# Patient Record
Sex: MALE | Race: BLACK OR AFRICAN AMERICAN | NOT HISPANIC OR LATINO | URBAN - METROPOLITAN AREA
[De-identification: names, ages, dates, MRNs, and addresses within clinical notes are randomized per-mention and may not be internally consistent; named-entity substitution may affect disease eponyms.]

---

## 2023-06-07 ENCOUNTER — HOSPITAL ENCOUNTER (OUTPATIENT)
Dept: DATA CONVERSION | Facility: HOSPITAL | Age: 78
End: 2023-06-07
Attending: INTERNAL MEDICINE | Admitting: INTERNAL MEDICINE
Payer: COMMERCIAL

## 2023-06-07 DIAGNOSIS — C34.11 MALIGNANT NEOPLASM OF UPPER LOBE, RIGHT BRONCHUS OR LUNG (MULTI): ICD-10-CM

## 2023-06-07 DIAGNOSIS — R09.89 OTHER SPECIFIED SYMPTOMS AND SIGNS INVOLVING THE CIRCULATORY AND RESPIRATORY SYSTEMS: ICD-10-CM

## 2023-06-07 DIAGNOSIS — Z79.82 LONG TERM (CURRENT) USE OF ASPIRIN: ICD-10-CM

## 2023-06-07 DIAGNOSIS — I10 ESSENTIAL (PRIMARY) HYPERTENSION: ICD-10-CM

## 2023-06-07 DIAGNOSIS — R91.8 OTHER NONSPECIFIC ABNORMAL FINDING OF LUNG FIELD: ICD-10-CM

## 2023-06-07 DIAGNOSIS — F17.210 NICOTINE DEPENDENCE, CIGARETTES, UNCOMPLICATED: ICD-10-CM

## 2023-06-09 LAB
COMPLETE PATHOLOGY REPORT: NORMAL
CONVERTED ADDENDUM DIAGNOSIS 2: NORMAL
CONVERTED CLINICAL DIAGNOSIS-HISTORY: NORMAL
CONVERTED DIAGNOSIS COMMENT: NORMAL
CONVERTED FINAL DIAGNOSIS: NORMAL
CONVERTED FINAL REPORT PDF LINK TO COPY AND PASTE: NORMAL
CONVERTED RAPID EVALUATION: NORMAL
CONVERTED SPECIMEN DESCRIPTION: NORMAL

## 2023-06-12 LAB
COMPLETE PATHOLOGY REPORT: NORMAL
CONVERTED ADDENDUM DIAGNOSIS 2: NORMAL
CONVERTED CLINICAL DIAGNOSIS-HISTORY: NORMAL
CONVERTED FINAL DIAGNOSIS: NORMAL
CONVERTED FINAL REPORT PDF LINK TO COPY AND PASTE: NORMAL
CONVERTED GROSS DESCRIPTION: NORMAL

## 2023-09-07 VITALS — BODY MASS INDEX: 24.93 KG/M2 | WEIGHT: 174.16 LBS | HEIGHT: 70 IN

## 2023-09-25 PROBLEM — H25.813 COMBINED FORM OF AGE-RELATED CATARACT, BOTH EYES: Status: ACTIVE | Noted: 2023-09-25

## 2023-09-25 PROBLEM — R60.0 EDEMA, LEG: Status: ACTIVE | Noted: 2023-09-25

## 2023-09-25 PROBLEM — H52.00 HYPEROPIA WITH ASTIGMATISM AND PRESBYOPIA: Status: ACTIVE | Noted: 2023-09-25

## 2023-09-25 PROBLEM — R42 DIZZINESS: Status: ACTIVE | Noted: 2023-09-25

## 2023-09-25 PROBLEM — G45.9 TIA (TRANSIENT ISCHEMIC ATTACK): Status: ACTIVE | Noted: 2023-09-25

## 2023-09-25 PROBLEM — R94.31: Status: ACTIVE | Noted: 2023-09-25

## 2023-09-25 PROBLEM — C34.90 NON-SMALL CELL LUNG CANCER (MULTI): Status: ACTIVE | Noted: 2023-09-25

## 2023-09-25 PROBLEM — R04.2 HEMOPTYSIS: Status: ACTIVE | Noted: 2023-09-25

## 2023-09-25 PROBLEM — H52.209 HYPEROPIA WITH ASTIGMATISM AND PRESBYOPIA: Status: ACTIVE | Noted: 2023-09-25

## 2023-09-25 PROBLEM — H90.3 ASYMMETRICAL SENSORINEURAL HEARING LOSS: Status: ACTIVE | Noted: 2023-09-25

## 2023-09-25 PROBLEM — J44.9 CHRONIC OBSTRUCTIVE PULMONARY DISEASE (MULTI): Status: ACTIVE | Noted: 2023-09-25

## 2023-09-25 PROBLEM — H52.4 HYPEROPIA WITH ASTIGMATISM AND PRESBYOPIA: Status: ACTIVE | Noted: 2023-09-25

## 2023-09-25 PROBLEM — H02.889 MEIBOMIAN GLAND DYSFUNCTION (MGD): Status: ACTIVE | Noted: 2023-09-25

## 2023-09-25 PROBLEM — H35.039 HYPERTENSIVE RETINOPATHY, GRADE 1: Status: ACTIVE | Noted: 2023-09-25

## 2023-09-25 PROBLEM — H11.31 SUBCONJUNCTIVAL HEMORRHAGE OF RIGHT EYE: Status: ACTIVE | Noted: 2023-09-25

## 2023-09-25 PROBLEM — R91.8 LUNG MASS: Status: ACTIVE | Noted: 2023-09-25

## 2023-09-25 RX ORDER — ACETAMINOPHEN 500 MG
1 TABLET ORAL 2 TIMES DAILY
COMMUNITY
Start: 2015-01-12

## 2023-09-25 RX ORDER — LISINOPRIL 20 MG/1
TABLET ORAL
COMMUNITY
Start: 2015-11-09

## 2023-09-25 RX ORDER — NYSTATIN 100000 U/G
CREAM TOPICAL
COMMUNITY
Start: 2016-09-26

## 2023-09-25 RX ORDER — CHLORHEXIDINE GLUCONATE ORAL RINSE 1.2 MG/ML
SOLUTION DENTAL
COMMUNITY
Start: 2023-07-13

## 2023-09-25 RX ORDER — LISINOPRIL 40 MG/1
0.5 TABLET ORAL DAILY
COMMUNITY
Start: 2017-06-27

## 2023-09-25 RX ORDER — DUTASTERIDE 0.5 MG/1
CAPSULE, LIQUID FILLED ORAL
COMMUNITY
Start: 2014-11-19

## 2023-09-25 RX ORDER — AMLODIPINE BESYLATE 2.5 MG/1
TABLET ORAL
COMMUNITY
Start: 2014-12-15

## 2023-09-25 RX ORDER — FLUOCINONIDE 0.5 MG/G
OINTMENT TOPICAL
COMMUNITY
Start: 2014-10-01

## 2023-09-25 RX ORDER — ASPIRIN 81 MG/1
1 TABLET ORAL DAILY
COMMUNITY
Start: 2017-05-03

## 2023-09-25 RX ORDER — AMLODIPINE BESYLATE 10 MG/1
1 TABLET ORAL DAILY
COMMUNITY
Start: 2023-05-24

## 2023-09-25 RX ORDER — TAMSULOSIN HYDROCHLORIDE 0.4 MG/1
1 CAPSULE ORAL 2 TIMES DAILY
COMMUNITY

## 2023-09-25 RX ORDER — ATORVASTATIN CALCIUM 10 MG/1
TABLET, FILM COATED ORAL
COMMUNITY
Start: 2014-12-15

## 2023-09-25 RX ORDER — LOSARTAN POTASSIUM 25 MG/1
1 TABLET ORAL DAILY
COMMUNITY
Start: 2023-07-17

## 2023-09-25 RX ORDER — ALBUTEROL SULFATE 90 UG/1
AEROSOL, METERED RESPIRATORY (INHALATION)
COMMUNITY
Start: 2016-09-26

## 2023-09-25 RX ORDER — CHLORHEXIDINE GLUCONATE 4 %
LIQUID (ML) TOPICAL
COMMUNITY
Start: 2023-07-13

## 2023-09-25 RX ORDER — FINASTERIDE 5 MG/1
1 TABLET, FILM COATED ORAL DAILY
COMMUNITY

## 2023-10-02 ENCOUNTER — OFFICE VISIT (OUTPATIENT)
Dept: PULMONOLOGY | Facility: HOSPITAL | Age: 78
End: 2023-10-02
Payer: COMMERCIAL

## 2023-10-02 VITALS
HEART RATE: 55 BPM | WEIGHT: 178.4 LBS | BODY MASS INDEX: 25.54 KG/M2 | HEIGHT: 70 IN | OXYGEN SATURATION: 95 % | DIASTOLIC BLOOD PRESSURE: 80 MMHG | RESPIRATION RATE: 20 BRPM | SYSTOLIC BLOOD PRESSURE: 135 MMHG | TEMPERATURE: 97.4 F

## 2023-10-02 DIAGNOSIS — J44.9 CHRONIC OBSTRUCTIVE PULMONARY DISEASE, UNSPECIFIED COPD TYPE (MULTI): Primary | ICD-10-CM

## 2023-10-02 DIAGNOSIS — C34.91 NON-SMALL CELL CANCER OF RIGHT LUNG (MULTI): ICD-10-CM

## 2023-10-02 DIAGNOSIS — J43.9 PULMONARY EMPHYSEMA, UNSPECIFIED EMPHYSEMA TYPE (MULTI): ICD-10-CM

## 2023-10-02 PROCEDURE — 1036F TOBACCO NON-USER: CPT | Performed by: NURSE PRACTITIONER

## 2023-10-02 PROCEDURE — G0463 HOSPITAL OUTPT CLINIC VISIT: HCPCS | Performed by: NURSE PRACTITIONER

## 2023-10-02 PROCEDURE — 99214 OFFICE O/P EST MOD 30 MIN: CPT | Performed by: NURSE PRACTITIONER

## 2023-10-02 PROCEDURE — 1126F AMNT PAIN NOTED NONE PRSNT: CPT | Performed by: NURSE PRACTITIONER

## 2023-10-02 PROCEDURE — 1159F MED LIST DOCD IN RCRD: CPT | Performed by: NURSE PRACTITIONER

## 2023-10-02 RX ORDER — ALBUTEROL SULFATE 0.83 MG/ML
2.5 SOLUTION RESPIRATORY (INHALATION) 4 TIMES DAILY PRN
Qty: 90 ML | Refills: 3 | Status: SHIPPED | OUTPATIENT
Start: 2023-10-02 | End: 2024-01-03

## 2023-10-02 ASSESSMENT — ENCOUNTER SYMPTOMS
OCCASIONAL FEELINGS OF UNSTEADINESS: 0
LOSS OF SENSATION IN FEET: 0
DEPRESSION: 0

## 2023-10-02 ASSESSMENT — PAIN SCALES - GENERAL: PAINLEVEL: 0-NO PAIN

## 2023-10-02 NOTE — PATIENT INSTRUCTIONS
Mr. Smith is a 78 year old AAM (former smoker~50 pack years ) here for follow up related to lung cancer. Bronchoscopy with biopsy showed adenocarcinoma. Last visit 6/15/23:     1. Lung cancer: adenocarcinoma - 7LN biopsy negative for malignancy  - continue to follow with Hem/Onc and Rad/ Onc     2. COPD: PFTs with severe obstruction. Echo with normal EF.   - start bevespi 2 puffs twice daily   - will order you nebulizer machine   - continue albuterol HFA 2 puffs or albuterol nebulizers every 4-6 hours as needed     Return to clinic 3 months or sooner if needed   Nurse Garcia - [phone]

## 2023-10-02 NOTE — PROGRESS NOTES
Mr. Smith is a 78 year old AAM (former smoker~50 pack years ) here for follow up related to lung cancer. Bronchoscopy with biopsy showed adenocarcinoma. Last visit 6/15/23:     PCP: Dr. Munoz   Hem/Onc: Dr. Jordan   Rad/Onc: Dr. Lee     HPI: Since last visit he underwent SBRT from 8/2023- 9/2023. He states overall he is ok. He states at times his breathing gets a little tired. He was able to get the PRN albuterol and has found it helpful. He has used it a couple times a day. He has wheezing at night and his wife will hear it during the day as well. He has SPARKS with walking on level ground after a few minutes and with going up stairs. He denies any cough, SOB at rest, CP, or GERD. He denies any nasal nasal congestion or runny nose. He does have post nasal drip/ throat clearing. GERD He has been told he snores -- no witnessed apneas.      6/15/23: Mr. Smith presented to the ED with hemoptysis and was found to have a RUL lung mass. He had bronchoscopy with biopsy that showed adenocarcinoma. LN 7 biopsied and negative for malignancy. He denies any SPARKS. He has PRN proair at home - he will use it if he is wheezing. He uses his rescue 4-5 x a week. He has been on the albuterol for the last 2 years.He notices intermittent wheezing. He denies any cough, SOB at rest, allergies, or GERD. HE had some CP from his bronchoscopy - still a little sore, but much improved from previous. He has been having LE swelling. He had COVID in 10/ 2021 - went to PCP at [facility]. He had infection on his foot - happened to find out he was positive from this visit. He states his swelling has been going on intermittently for a number of years.     Previous pulmonary history: He has no history of recurrent infections, or lung disease as a child. He had no previous lung hx, never on oxygen or inhaler therapy.     Inhalers/nebulized medications: PRN albuterol     Hospitalization History: He has not been hospitalized over the last year for  breathing related problem.    Sleep history: Denies snoring, apnea, feeling tired during the day or taking naps during the day. He will doze a times.     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     PFTs: 7/13/23 - FEV1/FVC 0.57/ FEV1 1.06 (43%)/ FVC 1.84 (56%)/ DLCO 29%     CT chest:   - 5/16/23 - IMPRESSION: No evidence of pulmonary embolism. Right upper lobe pulmonary mass measuring 3.7 x 2.7 cm is highly suspicious for malignancy. Adjacent patchy airspace disease is demonstrated in the right upper lobe. Small nodular density is also identified in the left upper lobe measuring 6 mm. Small low-attenuation lesions are identified in the visualized left hepatic lobe. Findings may represent cysts; however, metastatic lesions are not entirely excluded.   - 6/10/23 - Stable appearance of heterogeneous mass in the anterior segment of the right upper lobe, consistent with known lung malignancy. There is adjacent irregular interlobular thickening extending to the subpleural and sub-fissural planes, concerning for a component of lymphangitis. An additional stable punctate nodular opacity is indeterminate, however suspicious for a satellite nodule, in the setting of the additional changes as above. 2. Stable appearance of enlarged and prominent mediastinal lymph nodes, suspicious for glenis involvement. ABDOMEN-PELVIS: 1. No evidence of metastatic involvement in the abdomen and pelvis. 2. Cholelithiasis. 3. Punctate sclerotic focus in the body of L3, favored to represent a bony island, however attention on follow-up is recommended. 4. Additional findings as above       PET: 7/12/23: Hypermetabolic anterior right  upper lobe mass consistent with  biopsy-proven malignancy.  2. Nonspecific mildly hypermetabolic mediastinal and bilateral hilar  lymph nodes which may be reactive in nature, however metastatic  disease can not be definitely excluded.  3. Hypermetabolic focus in the region of the right parotid gland  which is favored to represent a benign process such as a Warthin's  tumor with a metastatic lymph node felt to be less likely.    Lung biopsy - 6/7/23 - LUNG, RIGHT UPPER LOBE NODULE, BIOPSY: --INVASIVE POORLY DIFFERENTIATED ADENOCARCINOMA ADENOCARCINOMA. SEE NOTE. --SEE CONCURRENT CYTOLOGY SPECIMEN [id number].   B. FINE NEEDLE ASPIRATION 7 LYMPH NODE, CYTOLOGY AND CELL BLOCK: NO MALIGNANT CELLS IDENTIFIED. LYMPHOID SAMPLE.    Echo: 7/13/23: EF 60-65%, RA normal size, RV normal size and function.     ROS:   Constitutional: No fever, no chills, no night sweats.  + fatigue   Eyes: No double vision, no floaters, no dry eyes.   ENT: See HPI.   Neck: No neck stiffness.  Cardiovascular: No sharp chest pain, no heart racing, no leg swelling.  Respiratory: as noted in HPI.   Gastrointestinal: No nausea, no vomiting, no diarrhea.   Musculoskeletal: No joint pain, no back pain.   Integumentary: No rashes or sores.  Neurological: No dizziness, no headaches. Sleeping well.  Psychiatric: No mood changes.   Endocrine: No hot flashes, no cold intolerance, weight is stable.  Hematologic: No easy bruising or bleeding.    PE:   Constitutional: General appearance: Alert and oriented.  No acute distress. Well developed, well nourished.  Head and face: Symmetric  ENT: external inspection of ear and nose normal.    Neck: supple, no lymphadenopathy  Pulmonary: Chest is normal. No increased work of breathing or signs of respiratory distress. Clear to auscultation bilaterally - no crackles, wheezing, or rhonchi.   Cardiovascular: Heart rate and rhythm normal. Normal S1, S2 - no murmurs, gallops, or pericardial rub.   Abdomen: Soft, non tender,  +BS  Extremities: No edema. No clubbing or cyanosis of the fingernails.    Neurologic: Moves all four extremities   MSK: Normal movements of extremities. Gait normal   Psychiatric: Intact judgement and insight.     Assessment and Plan:   Mr. Smith is a 78 year old AAM (former smoker~50 pack years ) here for follow up related to lung cancer. Bronchoscopy with biopsy showed adenocarcinoma. Last visit 6/15/23:     1. Lung cancer: adenocarcinoma - 7LN biopsy negative for malignancy  - continue to follow with Hem/Onc and Rad/ Onc     2. COPD: PFTs with severe obstruction. Echo with normal EF.   - start bevespi 2 puffs twice daily   - will order you nebulizer machine   - continue albuterol HFA 2 puffs or albuterol nebulizers every 4-6 hours as needed     Return to clinic 3 months or sooner if needed   Nurse Garcia - [phone]

## 2023-12-13 ENCOUNTER — TELEPHONE (OUTPATIENT)
Dept: RADIATION ONCOLOGY | Facility: HOSPITAL | Age: 78
End: 2023-12-13
Payer: COMMERCIAL

## 2023-12-13 DIAGNOSIS — C34.90 MALIGNANT NEOPLASM OF UNSPECIFIED PART OF UNSPECIFIED BRONCHUS OR LUNG (MULTI): Primary | ICD-10-CM

## 2023-12-14 ENCOUNTER — HOSPITAL ENCOUNTER (OUTPATIENT)
Dept: RADIOLOGY | Facility: HOSPITAL | Age: 78
Discharge: HOME | End: 2023-12-14
Payer: COMMERCIAL

## 2023-12-14 ENCOUNTER — HOSPITAL ENCOUNTER (OUTPATIENT)
Dept: RADIATION ONCOLOGY | Facility: HOSPITAL | Age: 78
Setting detail: RADIATION/ONCOLOGY SERIES
Discharge: HOME | End: 2023-12-14
Payer: COMMERCIAL

## 2023-12-14 VITALS
WEIGHT: 187.2 LBS | OXYGEN SATURATION: 94 % | BODY MASS INDEX: 26.8 KG/M2 | DIASTOLIC BLOOD PRESSURE: 84 MMHG | HEIGHT: 70 IN | TEMPERATURE: 97 F | SYSTOLIC BLOOD PRESSURE: 149 MMHG | HEART RATE: 64 BPM | RESPIRATION RATE: 18 BRPM

## 2023-12-14 DIAGNOSIS — C34.90 MALIGNANT NEOPLASM OF UNSPECIFIED PART OF UNSPECIFIED BRONCHUS OR LUNG (MULTI): ICD-10-CM

## 2023-12-14 DIAGNOSIS — C34.91 NON-SMALL CELL CANCER OF RIGHT LUNG (MULTI): Primary | ICD-10-CM

## 2023-12-14 LAB
CREAT SERPL-MCNC: 0.6 MG/DL (ref 0.6–1.3)
GFR SERPL CREATININE-BSD FRML MDRD: >90 ML/MIN/1.73M*2

## 2023-12-14 PROCEDURE — 2550000001 HC RX 255 CONTRASTS: Performed by: RADIOLOGY

## 2023-12-14 PROCEDURE — 99214 OFFICE O/P EST MOD 30 MIN: CPT | Performed by: NURSE PRACTITIONER

## 2023-12-14 PROCEDURE — 71260 CT THORAX DX C+: CPT

## 2023-12-14 PROCEDURE — 82565 ASSAY OF CREATININE: CPT

## 2023-12-14 PROCEDURE — 71260 CT THORAX DX C+: CPT | Performed by: RADIOLOGY

## 2023-12-14 RX ADMIN — IOHEXOL 75 ML: 350 INJECTION, SOLUTION INTRAVENOUS at 15:25

## 2023-12-14 ASSESSMENT — ENCOUNTER SYMPTOMS
CARDIOVASCULAR NEGATIVE: 1
WHEEZING: 1
MUSCULOSKELETAL NEGATIVE: 1
PSYCHIATRIC NEGATIVE: 1
CHOKING: 0
APNEA: 0
UNEXPECTED WEIGHT CHANGE: 0
OCCASIONAL FEELINGS OF UNSTEADINESS: 0
FEVER: 0
LOSS OF SENSATION IN FEET: 0
COUGH: 0
DEPRESSION: 0
APPETITE CHANGE: 0
CHILLS: 0
DIAPHORESIS: 0
GASTROINTESTINAL NEGATIVE: 1
HEMATOLOGIC/LYMPHATIC NEGATIVE: 1
SHORTNESS OF BREATH: 1
FATIGUE: 0
CHEST TIGHTNESS: 0
NEUROLOGICAL NEGATIVE: 1
ACTIVITY CHANGE: 0

## 2023-12-14 ASSESSMENT — COLUMBIA-SUICIDE SEVERITY RATING SCALE - C-SSRS
2. HAVE YOU ACTUALLY HAD ANY THOUGHTS OF KILLING YOURSELF?: NO
6. HAVE YOU EVER DONE ANYTHING, STARTED TO DO ANYTHING, OR PREPARED TO DO ANYTHING TO END YOUR LIFE?: NO
1. IN THE PAST MONTH, HAVE YOU WISHED YOU WERE DEAD OR WISHED YOU COULD GO TO SLEEP AND NOT WAKE UP?: NO

## 2023-12-14 ASSESSMENT — PATIENT HEALTH QUESTIONNAIRE - PHQ9
2. FEELING DOWN, DEPRESSED OR HOPELESS: NOT AT ALL
SUM OF ALL RESPONSES TO PHQ9 QUESTIONS 1 AND 2: 0
1. LITTLE INTEREST OR PLEASURE IN DOING THINGS: NOT AT ALL

## 2023-12-14 ASSESSMENT — PAIN SCALES - GENERAL: PAINLEVEL: 0-NO PAIN

## 2023-12-14 NOTE — PROGRESS NOTES
Patient ID: [id number]     Mr. Smith is a 77 y/o M with HTN, BPH, who presented to  ED in May 2023 complaining of intermittent coughing & started to cough up sputum mixed with blood for 2 days and was found to have RUL  mass leading to a diagnosis of early stage NSCLC.     Work up details including pathology and imaging results are described below.      Bronchoscopy with EBUS on 06/15/2023 by Dr. Sutton which has confirmed invasive poorly differentiated adenocarcinoma with negative station 7 N. PDL1 M 1%, negative for targeting mutations.     He was seen by Dr. Reynolds in thoracic surgery and underwent additional work up with demonstrated poor PFTs and hence not a surgical candidate.     PFT 07/13/2023: FEV1 < 43% predicted, FVC < 56% prdicted, DLCO <26% predicted.    Stereotactic body radiation therapy (SBRT) to the RUL given from August 30, 2023 through September 11, 2023, 5 fractions, 60 Gy.      History of presenting illness    Nadine Smith is a 78 y.o. male who presents today for follow up 3 months s/p SBRT to the RUL. Patient is doing well. Energy is baseline. Appetite is good. He has gained a few pounds. Denies any changes in breathing. Endorses SOB with exertion and a slight wheeze. Follows with pulmonologist. Using inhalers/nebulizer as prescribed. No oxygen requirements. Denies chest pain, back pain or fevers. No neurological symptoms. Following with Dr. Ester HASSAN.     Review of systems:  Review of Systems   Constitutional:  Negative for activity change, appetite change, chills, diaphoresis, fatigue, fever and unexpected weight change.   HENT: Negative.     Respiratory:  Positive for shortness of breath (with exertion) and wheezing. Negative for apnea, cough, choking and chest tightness.    Cardiovascular: Negative.    Gastrointestinal: Negative.    Genitourinary: Negative.    Musculoskeletal: Negative.    Neurological: Negative.    Hematological: Negative.    Psychiatric/Behavioral:  "Negative.         Past Medical history  He  has a past surgical history that includes CT angio head w and wo IV contrast (5/18/2017) and CT angio neck (5/18/2017).      Last recorded vital:  /84   Pulse 64   Temp 36.1 °C (97 °F) (Skin)   Resp 18   Ht 1.778 m (5' 10\")   Wt 84.9 kg (187 lb 3.2 oz)   SpO2 94%   BMI 26.86 kg/m²     Physical exam  Physical Exam  Constitutional:       General: He is not in acute distress.     Appearance: Normal appearance. He is normal weight. He is not ill-appearing, toxic-appearing or diaphoretic.   HENT:      Head: Normocephalic.      Mouth/Throat:      Mouth: Mucous membranes are moist.      Pharynx: Oropharynx is clear.   Eyes:      Conjunctiva/sclera: Conjunctivae normal.      Pupils: Pupils are equal, round, and reactive to light.   Cardiovascular:      Rate and Rhythm: Normal rate and regular rhythm.      Pulses: Normal pulses.      Heart sounds: Normal heart sounds.   Pulmonary:      Effort: Pulmonary effort is normal. No respiratory distress.      Breath sounds: Wheezing (LLL posterior expiratory wheeze) present. No rhonchi or rales.   Abdominal:      General: Bowel sounds are normal. There is no distension.      Palpations: Abdomen is soft. There is no mass.      Tenderness: There is no abdominal tenderness. There is no guarding or rebound.      Hernia: No hernia is present.   Musculoskeletal:         General: Normal range of motion.      Cervical back: Normal range of motion and neck supple.   Skin:     General: Skin is warm and dry.   Neurological:      General: No focal deficit present.      Mental Status: He is alert and oriented to person, place, and time.      Cranial Nerves: No cranial nerve deficit.      Sensory: No sensory deficit.      Motor: No weakness.      Coordination: Coordination normal.      Gait: Gait normal.   Psychiatric:         Mood and Affect: Mood normal.         Behavior: Behavior normal.         Thought Content: Thought content normal.    "      Judgment: Judgment normal.           Radiology results:  CT chest w IV contrast 12/14/2023    Narrative  Interpreted By:  Damian Styles and Afshari Mirak Sohrab  STUDY:  CT CHEST W IV CONTRAST;  12/14/2023 3:24 pm    INDICATION:  Signs/Symptoms: lung cancer f/u  C34.90: Non-small cell lung cancer.  Per EMR adenocarcinoma with negative lymph nodes status post  radiation from August 2023- September 2023    COMPARISON:  CT chest 06/10/2023.    ACCESSION NUMBER(S):  [id number]    ORDERING CLINICIAN:  RK SUMMERS    TECHNIQUE:  Helical data acquisition of the chest was obtained after  administration of 75 mL Omnipaque 350 intravenous contrast. Images  were reformatted in axial, coronal, and sagittal planes.    FINDINGS:  LUNGS AND AIRWAYS:  The trachea and central airways are patent. No endobronchial lesion  is seen.    Redemonstration of moderate paraseptal and centrilobular  emphysematous changes of bilateral lungs with apical predominance.  Again noted is a lesion spiculated margins right upper lobe measuring  2.6 x 1.5 cm (series 3, image 125), previously measuring 4.1 x 3.3  cm. There are linear bandlike opacities extending the lesion likely  representing post radiation changes. Previously visualized  pleural-based right upper lobe pulmonary nodule is not definitely  seen on the current exam. There are additional small pulmonary  nodules. For example, a 3 mm nodule in the left upper lobe (series 3,  image 173) and a 4 mm nodule in the right minor fissure (series 3,  image 170).    The bilateral lungs otherwise demonstrate no focal consolidation,  pleural effusion, or pneumothorax.    MEDIASTINUM AND LIZABETH, LOWER NECK AND AXILLA:  There is a focus of calcification in the posterior aspect of the left  thyroid lobe. No evidence of thoracic lymphadenopathy by CT criteria.  Esophagus appears within normal limits as seen.    HEART AND VESSELS:  The thoracic aorta normal in course and caliber.There is  mild  scattered calcified atherosclerosis present. Main pulmonary artery  and its branches are normal in caliber. Mild coronary artery  calcifications are seen.Please note,the study is not optimized for  evaluation of coronary arteries. The cardiac chambers are not  enlarged. There is no pericardial effusion seen.    UPPER ABDOMEN:  4.7 cm left renal cyst. There are multiple hypodense lesions  throughout the liver with the largest measuring 1.6 cm, likely  representing benign cysts.. Most of the lesions are too small to  characterize on the current exam. The visualized subdiaphragmatic  structures otherwise demonstrate no remarkable findings.    CHEST WALL AND OSSEOUS STRUCTURES:  Chest wall is within normal limits.  No acute osseous pathology.There are no suspicious osseous  lesions.Mild multilevel degenerative changes within visualized spine.    Impression  1.  Interval decreased size of a right upper lobe pulmonary lesion  with spiculated margins now measuring 2.6 x 1.5 cm. There are  adjacent postradiation changes.  2. No evidence of new lesion throughout the bilateral lungs.  3. No significant mediastinal or hilar lymphadenopathy.  4. Background of moderate emphysematous changes of the bilateral  lungs.  5. Additional findings as above.    I personally reviewed the images/study and I agree with the findings  as stated by resident physician Dr. Jose Alfredo Rousseau . This study  was interpreted at [facility],  [place], [place].    MACRO:  None    Signed by: Damian Styles 12/15/2023 9:24 AM  Dictation workstation:   [id number]         Plan:  Assessment/Plan     78 year old male 3 months s/p SBRT to the RUL. Patient is doing well with no acute complaints related to treatment. Continue to use inhalers and nebulizer as prescribed. Continue follow up with pulmonology as scheduled. CT scan done today prior to this appt. Scan shows interval decreased size of a right upper lobe  pulmonary lesion with spiculated margins now measuring 2.6 x 1.5 cm. There are  adjacent postradiation changes.  No evidence of new lesion throughout the bilateral lungs. No significant mediastinal or hilar lymphadenopathy. Patient to return to clinic in  4 months with same day CT of the chest. Instructed to call with any questions or concerns.       Problem List Items Addressed This Visit    None

## 2023-12-18 ENCOUNTER — OFFICE VISIT (OUTPATIENT)
Dept: PULMONOLOGY | Facility: HOSPITAL | Age: 78
End: 2023-12-18
Payer: COMMERCIAL

## 2023-12-18 VITALS
TEMPERATURE: 97.2 F | HEART RATE: 57 BPM | WEIGHT: 184 LBS | BODY MASS INDEX: 26.4 KG/M2 | DIASTOLIC BLOOD PRESSURE: 81 MMHG | OXYGEN SATURATION: 92 % | SYSTOLIC BLOOD PRESSURE: 147 MMHG

## 2023-12-18 DIAGNOSIS — J44.9 CHRONIC OBSTRUCTIVE PULMONARY DISEASE, UNSPECIFIED COPD TYPE (MULTI): Primary | ICD-10-CM

## 2023-12-18 PROCEDURE — 99213 OFFICE O/P EST LOW 20 MIN: CPT | Performed by: NURSE PRACTITIONER

## 2023-12-18 PROCEDURE — 1159F MED LIST DOCD IN RCRD: CPT | Performed by: NURSE PRACTITIONER

## 2023-12-18 PROCEDURE — 1036F TOBACCO NON-USER: CPT | Performed by: NURSE PRACTITIONER

## 2023-12-18 PROCEDURE — 1126F AMNT PAIN NOTED NONE PRSNT: CPT | Performed by: NURSE PRACTITIONER

## 2023-12-18 RX ORDER — AMLODIPINE BESYLATE 5 MG/1
1 TABLET ORAL DAILY
COMMUNITY

## 2023-12-18 SDOH — ECONOMIC STABILITY: FOOD INSECURITY: WITHIN THE PAST 12 MONTHS, YOU WORRIED THAT YOUR FOOD WOULD RUN OUT BEFORE YOU GOT MONEY TO BUY MORE.: NEVER TRUE

## 2023-12-18 SDOH — ECONOMIC STABILITY: FOOD INSECURITY: WITHIN THE PAST 12 MONTHS, THE FOOD YOU BOUGHT JUST DIDN'T LAST AND YOU DIDN'T HAVE MONEY TO GET MORE.: NEVER TRUE

## 2023-12-18 ASSESSMENT — PATIENT HEALTH QUESTIONNAIRE - PHQ9
1. LITTLE INTEREST OR PLEASURE IN DOING THINGS: NOT AT ALL
2. FEELING DOWN, DEPRESSED OR HOPELESS: NOT AT ALL
SUM OF ALL RESPONSES TO PHQ9 QUESTIONS 1 AND 2: 0

## 2023-12-18 ASSESSMENT — ENCOUNTER SYMPTOMS
AGITATION: 0
HEADACHES: 0
NUMBNESS: 0
ABDOMINAL PAIN: 0
DIARRHEA: 0
JOINT SWELLING: 0
FATIGUE: 0
PALPITATIONS: 0
VOMITING: 0
NERVOUS/ANXIOUS: 0
BACK PAIN: 0
DIZZINESS: 0
ARTHRALGIAS: 0
VOICE CHANGE: 0
RHINORRHEA: 0
FEVER: 0
NAUSEA: 0
MYALGIAS: 0
EYE PAIN: 0
SINUS PRESSURE: 0
WEAKNESS: 0

## 2023-12-18 NOTE — ADDENDUM NOTE
Encounter addended by: Kera Bradley, APRN-STEFANO on: 12/18/2023 10:44 AM   Actions taken: Visit diagnoses modified, Actions taken from a BestPractice Advisory, Order list changed, Diagnosis association updated, Clinical Note Signed

## 2023-12-18 NOTE — PATIENT INSTRUCTIONS
1. Lung cancer: adenocarcinoma - 7LN biopsy negative for malignancy  - continue to follow with Hem/Onc and Rad/ Onc      2. COPD: PFTs with severe obstruction. Echo with normal EF.   - continue bevespi 2 puffs twice daily   - continue albuterol HFA 2 puffs or albuterol nebulizers every 4-6 hours as needed     Thank you for visiting the Pulmonary clinic today!   Return to clinic  6 months or sooner if needed   Gabbie Sutton CNP  My office number is [phone]  Mary is my  and Radha is my nurse.   Radiology scheduling [phone]   Appointment scheduling [phone]

## 2023-12-18 NOTE — PROGRESS NOTES
Patient: Nadine Smith    [id number]  : 1945 -- AGE 78 y.o.    Provider: HARRISON Mari-CNP     Location [facility]   Service Date: 2023              [facility]  Follow up visit note      HISTORY OF PRESENT ILLNESS       HISTORY OF PRESENT ILLNESS     Mr. Smith is a 78 year old AAM (former smoker~50 pack years ) here for follow up related to lung cancer. Bronchoscopy with biopsy showed adenocarcinoma. Last visit 10/2/23     PCP: Dr. Munoz   Hem/Onc: Dr. Jordan   Rad/Onc: Dr. Lee     He has been using his bevespi twice daily, albuterol nebulizer 4x a day, and albuterol HFA a few times a week.   We were able to get him a new nebulizer machine last visit. He denies any cough. He has had some wheezing intermittently. He has SPARKS with going up stairs and walking on level ground after a few minutes. He has noticed some episodes of SOB at rest. He denies any CP or allergies. He will notice some GERD symptoms - depending on what he eats.     10/2/23: Since last visit he underwent SBRT from 2023- 2023. He states overall he is ok. He states at times his breathing gets a little tired. He was able to get the PRN albuterol and has found it helpful. He has used it a couple times a day. He has wheezing at night and his wife will hear it during the day as well. He has SPRAKS with walking on level ground after a few minutes and with going up stairs. He denies any cough, SOB at rest, CP, or GERD. He denies any nasal nasal congestion or runny nose. He does have post nasal drip/ throat clearing. GERD He has been told he snores -- no witnessed apneas.       6/15/23: Mr. Smith presented to the ED with hemoptysis and was found to have a RUL lung mass. He had bronchoscopy with biopsy that showed adenocarcinoma. LN 7 biopsied and negative for malignancy. He denies any SPARKS. He has PRN proair at home - he will use it if he is wheezing.  He uses his rescue 4-5 x a week. He has been on the albuterol for the last 2 years.He notices intermittent wheezing. He denies any cough, SOB at rest, allergies, or GERD. HE had some CP from his bronchoscopy - still a little sore, but much improved from previous. He has been having LE swelling. He had COVID in 10/ 2021 - went to PCP at [facility]. He had infection on his foot - happened to find out he was positive from this visit. He states his swelling has been going on intermittently for a number of years.      Previous pulmonary history: He has no history of recurrent infections, or lung disease as a child. He had no previous lung hx, never on oxygen or inhaler therapy.      Inhalers/nebulized medications: PRN albuterol      Hospitalization History: He has not been hospitalized over the last year for breathing related problem.     Sleep history: Denies snoring, apnea, feeling tired during the day or taking naps during the day. He will doze a times.        ALLERGIES AND MEDICATIONS     ALLERGIES  No Known Allergies    MEDICATIONS  Current Outpatient Medications   Medication Sig Dispense Refill    albuterol 2.5 mg /3 mL (0.083 %) nebulizer solution Take 3 mL (2.5 mg) by nebulization 4 times a day as needed for wheezing or shortness of breath. 90 mL 3    amLODIPine (Norvasc) 10 mg tablet Take 1 tablet (10 mg) by mouth once daily.      amLODIPine (Norvasc) 2.5 mg tablet amLODIPine Besylate 2.5 MG Oral Tablet   Quantity: 30  Refills: 0        Start : 15-Dec-2014   Active      aspirin 81 mg EC tablet Take 1 tablet (81 mg) by mouth once daily.      atorvastatin (Lipitor) 10 mg tablet Atorvastatin Calcium 10 MG Oral Tablet   Quantity: 30  Refills: 0        Start : 15-Dec-2014   Active      calcium carbonate-vitamin D3 600 mg-20 mcg (800 unit) tablet Take 1 tablet by mouth 2 times a day.      chlorhexidine (Peridex) 0.12 % solution RINSE MOUTH WITH 15ML (1 CAPFUL) FOR 30 SECONDS AM AND PM AFTER TOOTHBRUSHING. EXPECTORATE AFTER  RINSING, DO NOT SWALLOW      dutasteride (Avodart) 0.5 mg capsule Avodart 0.5 MG Oral Capsule   Quantity: 30  Refills: 0        Start : 19-Nov-2014   Active      finasteride (Proscar) 5 mg tablet Take 1 tablet (5 mg) by mouth once daily.      fluocinonide (Lidex) 0.05 % ointment Fluocinonide 0.05 % External Ointment   Quantity: 60  Refills: 0        Start : 1-Oct-2014   Active      glycopyrrolate-formoteroL 9-4.8 mcg HFA aerosol inhaler Inhale 2 puffs 2 times a day. 10.7 g 3    Hibiclens 4 % external liquid USE AD DIRECTED FOR PREOPERATIVE SHOWER      lisinopril 20 mg tablet Lisinopril 20 MG Oral Tablet   Quantity: 30  Refills: 0        Start : 9-Nov-2015   Active      lisinopril 40 mg tablet 0.5 tablets (20 mg) once daily.      losartan (Cozaar) 25 mg tablet Take 1 tablet (25 mg) by mouth once daily.      nystatin (Mycostatin) cream Nystatin 308456 UNIT/GM External Cream   Quantity: 120  Refills: 0        Start : 26-Sep-2016   Active      ProAir HFA 90 mcg/actuation inhaler ProAir  (90 Base) MCG/ACT Inhalation Aerosol Solution   Quantity: 85  Refills: 0        Start : 26-Sep-2016   Active      tamsulosin (Flomax) 0.4 mg 24 hr capsule Take 1 capsule (0.4 mg) by mouth 2 times a day. 30 minutes after the same meal each day.       No current facility-administered medications for this visit.         PAST HISTORY     PAST MEDICAL HISTORY  - HTN   - BPH   - lung cancer - adenocarcinoma - s/p SBRT 8/2023      PAST SURGICAL HISTORY  Past Surgical History:   Procedure Laterality Date    CT ANGIO NECK  5/18/2017    CT NECK ANGIO W AND WO IV CONTRAST [place] [facility]    CT HEAD ANGIO W AND WO IV CONTRAST  5/18/2017    CT HEAD ANGIO W AND WO IV CONTRAST [place]18/[place] [facility]       IMMUNIZATION HISTORY  Immunization History   Administered Date(s) Administered    Influenza, seasonal, injectable, preservative free 08/01/2015    Meningococcal MCV4P 08/01/2015    Moderna SARS-CoV-2 Vaccination  04/14/2021, 05/12/2021       SOCIAL HISTORY  smoking: [age] 5-10 cigarettes per day - quit last month ~50 pack years   drinking: none  illicit drug use: none       OCCUPATIONAL/ENVIRONMENTAL HISTORY  Occupation: Retired - 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     RESULTS/DATA     Pulmonary Function Test Results   PFTs: 7/13/23 - FEV1/FVC 0.57/ FEV1 1.06 (43%)/ FVC 1.84 (56%)/ DLCO 29%         Chest Radiograph     XR chest 2 view 06/09/2023  1. Redemonstration of 4 cm ovoid mass in the right upper lobe.  2. Minimal bibasilar atelectasis.      Chest CT Scan   CT chest:   - 5/16/23 - IMPRESSION: No evidence of pulmonary embolism. Right upper lobe pulmonary mass measuring 3.7 x 2.7 cm is highly suspicious for malignancy. Adjacent patchy airspace disease is demonstrated in the right upper lobe. Small nodular density is also identified in the left upper lobe measuring 6 mm. Small low-attenuation lesions are identified in the visualized left hepatic lobe. Findings may represent cysts; however, metastatic lesions are not entirely excluded.   - 6/10/23 - Stable appearance of heterogeneous mass in the anterior segment of the right upper lobe, consistent with known lung malignancy. There is adjacent irregular interlobular thickening extending to the subpleural and sub-fissural planes, concerning for a component of lymphangitis. An additional stable punctate nodular opacity is indeterminate, however suspicious for a satellite nodule, in the setting of the additional changes as above. 2. Stable appearance of enlarged and prominent mediastinal lymph nodes, suspicious for glenis involvement. ABDOMEN-PELVIS: 1. No evidence of metastatic involvement in the abdomen and pelvis. 2. Cholelithiasis. 3. Punctate sclerotic focus in the body of L3, favored to represent a bony island, however attention on follow-up is recommended. 4.  Additional findings as above   - 12/14/23- Interval decreased size of a right upper lobe pulmonary lesion with spiculated margins now measuring 2.6 x 1.5 cm. There are adjacent postradiation changes. 2. No evidence of new lesion throughout the bilateral lungs. 3. No significant mediastinal or hilar lymphadenopathy.4. Background of moderate emphysematous changes of the bilateral lungs. 5. Additional findings as above.    PET: 7/12/23: Hypermetabolic anterior right upper lobe mass consistent with  biopsy-proven malignancy.  2. Nonspecific mildly hypermetabolic mediastinal and bilateral hilar  lymph nodes which may be reactive in nature, however metastatic  disease can not be definitely excluded.  3. Hypermetabolic focus in the region of the right parotid gland  which is favored to represent a benign process such as a Warthin's  tumor with a metastatic lymph node felt to be less likely.     Echocardiogram     Echo: 7/13/23: EF 60-65%, RA normal size, RV normal size and function.         Labs/ Other testing      Lung biopsy - 6/7/23 - LUNG, RIGHT UPPER LOBE NODULE, BIOPSY: --INVASIVE POORLY DIFFERENTIATED ADENOCARCINOMA ADENOCARCINOMA. SEE NOTE. --SEE CONCURRENT CYTOLOGY SPECIMEN [id number].   B. FINE NEEDLE ASPIRATION 7 LYMPH NODE, CYTOLOGY AND CELL BLOCK: NO MALIGNANT CELLS IDENTIFIED. LYMPHOID SAMPLE.    REVIEW OF SYSTEMS     REVIEW OF SYSTEMS  Review of Systems   Constitutional:  Negative for fatigue and fever.   HENT:  Negative for congestion, postnasal drip, rhinorrhea, sinus pressure and voice change.    Eyes:  Negative for pain and visual disturbance.   Cardiovascular:  Negative for chest pain, palpitations and leg swelling.   Gastrointestinal:  Negative for abdominal pain, diarrhea, nausea and vomiting.   Endocrine: Negative for cold intolerance and heat intolerance.   Musculoskeletal:  Negative for arthralgias, back pain, joint swelling and myalgias.   Skin:  Negative for rash.   Neurological:  Negative for  dizziness, weakness, numbness and headaches.   Psychiatric/Behavioral:  Negative for agitation. The patient is not nervous/anxious.          PHYSICAL EXAM     VITAL SIGNS: There were no vitals taken for this visit.     CURRENT WEIGHT: [unfilled]  BMI: [unfilled]  PREVIOUS WEIGHTS:  Wt Readings from Last 3 Encounters:   12/14/23 84.9 kg (187 lb 3.2 oz)   10/02/23 80.9 kg (178 lb 6.4 oz)   06/22/23 77.2 kg (170 lb 2 oz)       Physical Exam  Vitals reviewed.   Constitutional:       General: He is not in acute distress.     Appearance: Normal appearance. He is not ill-appearing or toxic-appearing.   HENT:      Head: Normocephalic.      Nose: No rhinorrhea.   Cardiovascular:      Rate and Rhythm: Normal rate and regular rhythm.      Heart sounds: Normal heart sounds.   Pulmonary:      Effort: Pulmonary effort is normal. No respiratory distress.      Breath sounds: Normal breath sounds. No stridor.   Abdominal:      General: Abdomen is flat.   Musculoskeletal:         General: No swelling. Normal range of motion.   Skin:     General: Skin is warm and dry.      Nails: There is no clubbing.   Neurological:      General: No focal deficit present.      Mental Status: He is alert.   Psychiatric:         Mood and Affect: Mood normal.         Behavior: Behavior normal.         Judgment: Judgment normal.       ASSESSMENT/PLAN       1. Lung cancer: adenocarcinoma - 7LN biopsy negative for malignancy  - continue to follow with Hem/Onc and Rad/ Onc      2. COPD: PFTs with severe obstruction. Echo with normal EF.   - continue bevespi 2 puffs twice daily   - continue albuterol HFA 2 puffs or albuterol nebulizers every 4-6 hours as needed     --> Discussed talking with his PCP (possible SW) or financial assistance through [employer] - had concerns and questions regarding his medical bills.

## 2024-01-03 DIAGNOSIS — J43.9 PULMONARY EMPHYSEMA, UNSPECIFIED EMPHYSEMA TYPE (MULTI): ICD-10-CM

## 2024-01-03 RX ORDER — ALBUTEROL SULFATE 0.83 MG/ML
2.5 SOLUTION RESPIRATORY (INHALATION) 4 TIMES DAILY PRN
Qty: 75 ML | Refills: 3 | Status: SHIPPED | OUTPATIENT
Start: 2024-01-03 | End: 2024-04-24

## 2024-01-03 RX ORDER — GLYCOPYRROLATE AND FORMOTEROL FUMARATE 9; 4.8 UG/1; UG/1
AEROSOL, METERED RESPIRATORY (INHALATION)
Qty: 10.7 G | Refills: 3 | Status: SHIPPED | OUTPATIENT
Start: 2024-01-03

## 2024-01-25 ENCOUNTER — HOSPITAL ENCOUNTER (OUTPATIENT)
Dept: RADIOLOGY | Facility: HOSPITAL | Age: 79
Discharge: HOME | End: 2024-01-25
Payer: MEDICARE

## 2024-01-25 DIAGNOSIS — R07.9 CHEST PAIN, UNSPECIFIED: ICD-10-CM

## 2024-01-25 DIAGNOSIS — C34.11 MALIGNANT NEOPLASM OF UPPER LOBE, RIGHT BRONCHUS OR LUNG (MULTI): ICD-10-CM

## 2024-01-25 PROCEDURE — 71046 X-RAY EXAM CHEST 2 VIEWS: CPT

## 2024-01-25 PROCEDURE — 71046 X-RAY EXAM CHEST 2 VIEWS: CPT | Performed by: RADIOLOGY

## 2024-01-30 ENCOUNTER — SOCIAL WORK (OUTPATIENT)
Dept: CASE MANAGEMENT | Facility: HOSPITAL | Age: 79
End: 2024-01-30
Payer: MEDICARE

## 2024-01-30 DIAGNOSIS — J44.9 CHRONIC OBSTRUCTIVE PULMONARY DISEASE, UNSPECIFIED COPD TYPE (MULTI): Primary | ICD-10-CM

## 2024-01-30 NOTE — PROGRESS NOTES
This SW was asked by [facility] HARRISON to reach out to pt, who had called to ask for a  to help with medical bills. ASHLEY has tried several times to reach pt by phone without success, and he does not have voicemail set up. ASHLEY wrote letter with ASHLEY ph # and suggesting he call me and/or we can set up a time to meet. ASHLEY did not see any appts scheduled until April.

## 2024-01-31 RX ORDER — UMECLIDINIUM BROMIDE AND VILANTEROL TRIFENATATE 62.5; 25 UG/1; UG/1
1 POWDER RESPIRATORY (INHALATION) DAILY
Qty: 1 EACH | Refills: 5 | Status: SHIPPED | OUTPATIENT
Start: 2024-01-31

## 2024-02-12 ENCOUNTER — SOCIAL WORK (OUTPATIENT)
Dept: CASE MANAGEMENT | Facility: HOSPITAL | Age: 79
End: 2024-02-12
Payer: MEDICARE

## 2024-02-12 NOTE — PROGRESS NOTES
This SW has tried calling pt multiple times, with no answer and no ability to leave a message. Pt did reply and call this SW one time, apparently after receiving this SW's letter via US mail dated 01/30/24. After consultation with his [facility] financial counselor, this SW sent a letter dated 02/12, asking that pt contact fc instead, providing her name and number . She can assist him with completing the financial assistance application correctly and completely.

## 2024-04-17 ENCOUNTER — TELEPHONE (OUTPATIENT)
Dept: RADIATION ONCOLOGY | Facility: HOSPITAL | Age: 79
End: 2024-04-17
Payer: MEDICARE

## 2024-04-17 DIAGNOSIS — C34.91 MALIGNANT NEOPLASM OF UNSPECIFIED PART OF RIGHT BRONCHUS OR LUNG (MULTI): Primary | ICD-10-CM

## 2024-04-17 NOTE — TELEPHONE ENCOUNTER
Called pt to remind of appointment on 4/18/2024 at 1:30. Pt's phone went to voicemail but the mailbox hasn't been set up yet.

## 2024-04-18 ENCOUNTER — HOSPITAL ENCOUNTER (OUTPATIENT)
Dept: RADIOLOGY | Facility: HOSPITAL | Age: 79
Discharge: HOME | End: 2024-04-18
Payer: MEDICARE

## 2024-04-18 ENCOUNTER — HOSPITAL ENCOUNTER (OUTPATIENT)
Dept: RADIATION ONCOLOGY | Facility: HOSPITAL | Age: 79
Setting detail: RADIATION/ONCOLOGY SERIES
Discharge: HOME | End: 2024-04-18
Payer: MEDICARE

## 2024-04-18 VITALS
SYSTOLIC BLOOD PRESSURE: 172 MMHG | BODY MASS INDEX: 26.89 KG/M2 | WEIGHT: 187.39 LBS | HEART RATE: 73 BPM | TEMPERATURE: 96.4 F | OXYGEN SATURATION: 95 % | DIASTOLIC BLOOD PRESSURE: 88 MMHG | RESPIRATION RATE: 18 BRPM

## 2024-04-18 DIAGNOSIS — C34.91 NON-SMALL CELL CANCER OF RIGHT LUNG (MULTI): Primary | ICD-10-CM

## 2024-04-18 DIAGNOSIS — C34.91 NON-SMALL CELL CANCER OF RIGHT LUNG (MULTI): ICD-10-CM

## 2024-04-18 LAB
CREAT SERPL-MCNC: 0.8 MG/DL (ref 0.6–1.3)
GFR SERPL CREATININE-BSD FRML MDRD: 89 ML/MIN/1.73M*2

## 2024-04-18 PROCEDURE — 71260 CT THORAX DX C+: CPT

## 2024-04-18 PROCEDURE — 71260 CT THORAX DX C+: CPT | Performed by: RADIOLOGY

## 2024-04-18 PROCEDURE — 82565 ASSAY OF CREATININE: CPT

## 2024-04-18 PROCEDURE — 99214 OFFICE O/P EST MOD 30 MIN: CPT | Performed by: NURSE PRACTITIONER

## 2024-04-18 PROCEDURE — 2550000001 HC RX 255 CONTRASTS: Performed by: NURSE PRACTITIONER

## 2024-04-18 RX ADMIN — IOHEXOL 75 ML: 350 INJECTION, SOLUTION INTRAVENOUS at 14:09

## 2024-04-18 ASSESSMENT — ENCOUNTER SYMPTOMS
CARDIOVASCULAR NEGATIVE: 1
WHEEZING: 1
ACTIVITY CHANGE: 0
OCCASIONAL FEELINGS OF UNSTEADINESS: 0
DEPRESSION: 0
UNEXPECTED WEIGHT CHANGE: 0
SHORTNESS OF BREATH: 1
HEMATOLOGIC/LYMPHATIC NEGATIVE: 1
LOSS OF SENSATION IN FEET: 0
CHOKING: 0
GASTROINTESTINAL NEGATIVE: 1
APNEA: 0
DIAPHORESIS: 0
CHILLS: 0
NEUROLOGICAL NEGATIVE: 1
APPETITE CHANGE: 0
MUSCULOSKELETAL NEGATIVE: 1
FEVER: 0
COUGH: 0
PSYCHIATRIC NEGATIVE: 1
FATIGUE: 0
CHEST TIGHTNESS: 0

## 2024-04-18 ASSESSMENT — PATIENT HEALTH QUESTIONNAIRE - PHQ9
1. LITTLE INTEREST OR PLEASURE IN DOING THINGS: NOT AT ALL
SUM OF ALL RESPONSES TO PHQ9 QUESTIONS 1 AND 2: 0
2. FEELING DOWN, DEPRESSED OR HOPELESS: NOT AT ALL

## 2024-04-18 ASSESSMENT — PAIN SCALES - GENERAL: PAINLEVEL: 0-NO PAIN

## 2024-04-18 NOTE — PROGRESS NOTES
Patient ID: [id number]     Mr. Smith is a 80 y/o M with HTN, BPH, who presented to  ED in May 2023 complaining of intermittent coughing & started to cough up sputum mixed with blood for 2 days and was found to have RUL  mass leading to a diagnosis of early stage NSCLC.     Work up details including pathology and imaging results are described below.      Bronchoscopy with EBUS on 06/15/2023 by Dr. Sutton which has confirmed invasive poorly differentiated adenocarcinoma with negative station 7 N. PDL1 M 1%, negative for targeting mutations.     He was seen by Dr. Reynolds in thoracic surgery and underwent additional work up with demonstrated poor PFTs and hence not a surgical candidate.     PFT 07/13/2023: FEV1 < 43% predicted, FVC < 56% prdicted, DLCO <26% predicted.    Stereotactic body radiation therapy (SBRT) to the RUL given from August 30, 2023 through September 11, 2023, 5 fractions, 60 Gy.      History of presenting illness    Nadine Smith is a 79 y.o. male who presents today for follow up 7 months s/p SBRT to the RUL. Patient is doing well. Energy is baseline. Appetite is good. Weight stable. Denies any changes in breathing. Endorses SOB with exertion and a slight wheeze. He has not seen the pulmonologist in some time. Using inhalers/nebulizer as prescribed. No oxygen requirements. Denies chest pain, back pain or fevers. No neurological symptoms. Following with Dr. Ester HASSAN.     Review of systems:  Review of Systems   Constitutional:  Negative for activity change, appetite change, chills, diaphoresis, fatigue, fever and unexpected weight change.   HENT: Negative.     Respiratory:  Positive for shortness of breath (with exertion) and wheezing. Negative for apnea, cough, choking and chest tightness.    Cardiovascular: Negative.    Gastrointestinal: Negative.    Genitourinary: Negative.    Musculoskeletal: Negative.    Neurological: Negative.    Hematological: Negative.     Psychiatric/Behavioral: Negative.         Past Medical history  He  has a past surgical history that includes CT angio head w and wo IV contrast (5/18/2017) and CT angio neck (5/18/2017).      Last recorded vital:  There were no vitals taken for this visit.    Physical exam  Physical Exam  Constitutional:       General: He is not in acute distress.     Appearance: Normal appearance. He is normal weight. He is not ill-appearing, toxic-appearing or diaphoretic.   HENT:      Head: Normocephalic.      Mouth/Throat:      Mouth: Mucous membranes are moist.      Pharynx: Oropharynx is clear.   Eyes:      Conjunctiva/sclera: Conjunctivae normal.      Pupils: Pupils are equal, round, and reactive to light.   Cardiovascular:      Rate and Rhythm: Normal rate and regular rhythm.      Pulses: Normal pulses.      Heart sounds: Normal heart sounds.   Pulmonary:      Effort: Pulmonary effort is normal. No respiratory distress.      Breath sounds: No wheezing (LLL posterior expiratory wheeze), rhonchi or rales.   Abdominal:      General: Bowel sounds are normal. There is no distension.      Palpations: Abdomen is soft. There is no mass.      Tenderness: There is no abdominal tenderness. There is no guarding or rebound.      Hernia: No hernia is present.   Musculoskeletal:         General: Normal range of motion.      Cervical back: Normal range of motion and neck supple.   Skin:     General: Skin is warm and dry.   Neurological:      General: No focal deficit present.      Mental Status: He is alert and oriented to person, place, and time.      Cranial Nerves: No cranial nerve deficit.      Sensory: No sensory deficit.      Motor: No weakness.      Coordination: Coordination normal.      Gait: Gait normal.   Psychiatric:         Mood and Affect: Mood normal.         Behavior: Behavior normal.         Thought Content: Thought content normal.         Judgment: Judgment normal.           Radiology results:  CT chest w IV  contrast    Result Date: 4/18/2024  Interpreted By:  Edel Gonzalez, STUDY: CT chest with contrast.   INDICATION: Signs/Symptoms:history of lung cancer s/p RT. Right upper lobe pulmonary adenocarcinoma status post radiation therapy.   COMPARISON: 12/14/2023   ACCESSION NUMBER(S): [id number]   ORDERING CLINICIAN: REJI LEDESMA   TECHNIQUE: Helical data acquisition of the chest was obtained. Intravenous contrast was given, 75 ml of Omnipaque 350. Images were reformatted in axial, coronal, and sagittal planes.   FINDINGS: LUNG/PLEURA/LARGE AIRWAYS: No consolidation, infiltrate, pleural effusion or pneumothorax. Airways are clear. Small scattered calcified granulomas of the lungs. Linear atelectatic changes of the lung bases.   Moderate upper lung predominant emphysema.   Continued interval decrease in size of right upper lobe pulmonary mass measuring 13 x 21 mm versus 15 x 26 mm on the previous study. 3 mm left upper lobe nodule on image 175, stable. Additional small stable pulmonary nodules in juxtapleural locations compatible with intrapulmonary lymph nodes are noted on images 198 and 229. No new suspicious pulmonary nodules.   VESSELS: Aorta and pulmonary arteries are normal caliber. Mild-to-moderate coronary artery calcifications. Study is not optimized for evaluation of coronary arteries.   HEART: The heart is normal in size. No pericardial effusion.   MEDIASTINUM AND LIZABETH: No mediastinal or hilar lymphadenopathy. The esophagus appears normal.   CHEST WALL AND LOWER NECK: The visualized thyroid gland appears within normal limits. No acute fracture or suspicious osseous lesion. The soft tissues of the chest wall demonstrate no abnormality. No axillary adenopathy.   UPPER ABDOMEN: Stable appearance of multiple sharply circumscribed hypoattenuating lesions in the liver compatible with simple cysts. Prominent simple cyst in the upper pole of the left kidney.       1. Continued interval decrease in size of right  upper lobe pulmonary mass as described above. 2. Stable small pulmonary nodules as described above with no new pulmonary nodules appreciated. 3. Moderate upper lung predominant emphysema.   MACRO: None.   Signed by: Edel Gonzalez 4/18/2024 3:07 PM Dictation workstation:   [id number]      Plan:  Assessment/Plan     79 year old male 7 months s/p SBRT to the RUL. Patient is doing well with no acute complaints related to treatment. Continue to use inhalers and nebulizer as prescribed. CT scan done today prior to this appt. Scan shows continued interval decrease in size of right upper lobe pulmonary mass as described above. Stable small pulmonary nodules as described above with no new pulmonary nodules appreciated. Moderate upper lung predominant emphysema. Patient to return to clinic in  6 months with same day CT of the chest. Instructed to call with any questions or concerns.

## 2024-04-24 DIAGNOSIS — J43.9 PULMONARY EMPHYSEMA, UNSPECIFIED EMPHYSEMA TYPE (MULTI): ICD-10-CM

## 2024-04-24 RX ORDER — ALBUTEROL SULFATE 0.83 MG/ML
2.5 SOLUTION RESPIRATORY (INHALATION) 4 TIMES DAILY PRN
Qty: 75 ML | Refills: 3 | Status: SHIPPED | OUTPATIENT
Start: 2024-04-24 | End: 2025-04-24

## 2024-05-11 ENCOUNTER — APPOINTMENT (OUTPATIENT)
Dept: RADIOLOGY | Facility: HOSPITAL | Age: 79
End: 2024-05-11
Payer: MEDICARE

## 2024-05-11 ENCOUNTER — HOSPITAL ENCOUNTER (EMERGENCY)
Facility: HOSPITAL | Age: 79
Discharge: HOME | End: 2024-05-11
Attending: EMERGENCY MEDICINE
Payer: MEDICARE

## 2024-05-11 VITALS
HEART RATE: 54 BPM | SYSTOLIC BLOOD PRESSURE: 128 MMHG | BODY MASS INDEX: 26.55 KG/M2 | RESPIRATION RATE: 18 BRPM | HEIGHT: 72 IN | DIASTOLIC BLOOD PRESSURE: 75 MMHG | WEIGHT: 195.99 LBS | OXYGEN SATURATION: 95 %

## 2024-05-11 DIAGNOSIS — K40.20 BILATERAL INGUINAL HERNIA WITHOUT OBSTRUCTION OR GANGRENE, RECURRENCE NOT SPECIFIED: Primary | ICD-10-CM

## 2024-05-11 LAB
ALBUMIN SERPL BCP-MCNC: 4 G/DL (ref 3.4–5)
ALP SERPL-CCNC: 64 U/L (ref 33–136)
ALT SERPL W P-5'-P-CCNC: 8 U/L (ref 10–52)
ANION GAP SERPL CALC-SCNC: 13 MMOL/L (ref 10–20)
APPEARANCE UR: CLEAR
AST SERPL W P-5'-P-CCNC: 18 U/L (ref 9–39)
BASOPHILS # BLD AUTO: 0.06 X10*3/UL (ref 0–0.1)
BASOPHILS NFR BLD AUTO: 1.7 %
BILIRUB SERPL-MCNC: 1.2 MG/DL (ref 0–1.2)
BILIRUB UR STRIP.AUTO-MCNC: NEGATIVE MG/DL
BUN SERPL-MCNC: 12 MG/DL (ref 6–23)
CALCIUM SERPL-MCNC: 8.9 MG/DL (ref 8.6–10.6)
CHLORIDE SERPL-SCNC: 104 MMOL/L (ref 98–107)
CO2 SERPL-SCNC: 25 MMOL/L (ref 21–32)
COLOR UR: NORMAL
CREAT SERPL-MCNC: 0.86 MG/DL (ref 0.5–1.3)
EGFRCR SERPLBLD CKD-EPI 2021: 88 ML/MIN/1.73M*2
EOSINOPHIL # BLD AUTO: 0.14 X10*3/UL (ref 0–0.4)
EOSINOPHIL NFR BLD AUTO: 3.9 %
ERYTHROCYTE [DISTWIDTH] IN BLOOD BY AUTOMATED COUNT: 16 % (ref 11.5–14.5)
GLUCOSE SERPL-MCNC: 79 MG/DL (ref 74–99)
GLUCOSE UR STRIP.AUTO-MCNC: NORMAL MG/DL
HCT VFR BLD AUTO: 39.3 % (ref 41–52)
HGB BLD-MCNC: 13.3 G/DL (ref 13.5–17.5)
IMM GRANULOCYTES # BLD AUTO: 0.01 X10*3/UL (ref 0–0.5)
IMM GRANULOCYTES NFR BLD AUTO: 0.3 % (ref 0–0.9)
KETONES UR STRIP.AUTO-MCNC: NEGATIVE MG/DL
LACTATE SERPL-SCNC: 0.5 MMOL/L (ref 0.4–2)
LEUKOCYTE ESTERASE UR QL STRIP.AUTO: NEGATIVE
LYMPHOCYTES # BLD AUTO: 0.94 X10*3/UL (ref 0.8–3)
LYMPHOCYTES NFR BLD AUTO: 26 %
MCH RBC QN AUTO: 26.1 PG (ref 26–34)
MCHC RBC AUTO-ENTMCNC: 33.8 G/DL (ref 32–36)
MCV RBC AUTO: 77 FL (ref 80–100)
MONOCYTES # BLD AUTO: 0.43 X10*3/UL (ref 0.05–0.8)
MONOCYTES NFR BLD AUTO: 11.9 %
NEUTROPHILS # BLD AUTO: 2.04 X10*3/UL (ref 1.6–5.5)
NEUTROPHILS NFR BLD AUTO: 56.2 %
NITRITE UR QL STRIP.AUTO: NEGATIVE
NRBC BLD-RTO: 0 /100 WBCS (ref 0–0)
PH UR STRIP.AUTO: 6.5 [PH]
PLATELET # BLD AUTO: 83 X10*3/UL (ref 150–450)
POTASSIUM SERPL-SCNC: 3.9 MMOL/L (ref 3.5–5.3)
PROT SERPL-MCNC: 8.2 G/DL (ref 6.4–8.2)
PROT UR STRIP.AUTO-MCNC: NEGATIVE MG/DL
RBC # BLD AUTO: 5.09 X10*6/UL (ref 4.5–5.9)
RBC # UR STRIP.AUTO: NEGATIVE /UL
SODIUM SERPL-SCNC: 138 MMOL/L (ref 136–145)
SP GR UR STRIP.AUTO: 1.01
UROBILINOGEN UR STRIP.AUTO-MCNC: NORMAL MG/DL
WBC # BLD AUTO: 3.6 X10*3/UL (ref 4.4–11.3)

## 2024-05-11 PROCEDURE — 80053 COMPREHEN METABOLIC PANEL: CPT

## 2024-05-11 PROCEDURE — 99284 EMERGENCY DEPT VISIT MOD MDM: CPT | Mod: 25

## 2024-05-11 PROCEDURE — 99285 EMERGENCY DEPT VISIT HI MDM: CPT | Performed by: EMERGENCY MEDICINE

## 2024-05-11 PROCEDURE — 2550000001 HC RX 255 CONTRASTS: Performed by: EMERGENCY MEDICINE

## 2024-05-11 PROCEDURE — 36415 COLL VENOUS BLD VENIPUNCTURE: CPT

## 2024-05-11 PROCEDURE — 74177 CT ABD & PELVIS W/CONTRAST: CPT

## 2024-05-11 PROCEDURE — 74177 CT ABD & PELVIS W/CONTRAST: CPT | Mod: FOREIGN READ | Performed by: RADIOLOGY

## 2024-05-11 PROCEDURE — 81003 URINALYSIS AUTO W/O SCOPE: CPT

## 2024-05-11 PROCEDURE — 83605 ASSAY OF LACTIC ACID: CPT

## 2024-05-11 PROCEDURE — 85025 COMPLETE CBC W/AUTO DIFF WBC: CPT

## 2024-05-11 RX ADMIN — IOHEXOL 80 ML: 350 INJECTION, SOLUTION INTRAVENOUS at 14:16

## 2024-05-11 ASSESSMENT — PAIN DESCRIPTION - ORIENTATION: ORIENTATION: RIGHT;LOWER

## 2024-05-11 ASSESSMENT — LIFESTYLE VARIABLES
EVER HAD A DRINK FIRST THING IN THE MORNING TO STEADY YOUR NERVES TO GET RID OF A HANGOVER: NO
TOTAL SCORE: 0
HAVE PEOPLE ANNOYED YOU BY CRITICIZING YOUR DRINKING: NO
HAVE YOU EVER FELT YOU SHOULD CUT DOWN ON YOUR DRINKING: NO
EVER FELT BAD OR GUILTY ABOUT YOUR DRINKING: NO

## 2024-05-11 ASSESSMENT — PAIN SCALES - GENERAL
PAINLEVEL_OUTOF10: 3
PAINLEVEL_OUTOF10: 5 - MODERATE PAIN

## 2024-05-11 ASSESSMENT — PAIN DESCRIPTION - PAIN TYPE: TYPE: ACUTE PAIN

## 2024-05-11 ASSESSMENT — PAIN DESCRIPTION - FREQUENCY: FREQUENCY: CONSTANT/CONTINUOUS

## 2024-05-11 ASSESSMENT — PAIN DESCRIPTION - PROGRESSION: CLINICAL_PROGRESSION: NOT CHANGED

## 2024-05-11 ASSESSMENT — PAIN DESCRIPTION - ONSET: ONSET: ONGOING

## 2024-05-11 ASSESSMENT — PAIN - FUNCTIONAL ASSESSMENT: PAIN_FUNCTIONAL_ASSESSMENT: 0-10

## 2024-05-11 ASSESSMENT — PAIN DESCRIPTION - LOCATION: LOCATION: ABDOMEN

## 2024-05-11 ASSESSMENT — PAIN DESCRIPTION - DESCRIPTORS: DESCRIPTORS: ACHING

## 2024-05-11 NOTE — ED TRIAGE NOTES
Pt to ED c/o R upper groin pain and swelling. He states he first noticed it approximately 1 week ago. Pain worsens when he bends over. He denies n/v. Denies  complaints. Hx of HTN - states he took his meds this morning. Of note, pt is hard of hearing and has hearing aids in place.

## 2024-05-11 NOTE — ED PROVIDER NOTES
HPI   Chief Complaint   Patient presents with    Groin Swelling       HPI    Patient is a 80 yo M with history of HTN, BPH, NSCLC s/p SBRT, who presents with 1 week of R upper groin pain and swelling. States pain is worse when he bends over. Swelling was worse initially with associated redness. Denies any heavy lifting or straining. Denies fever, chills, abdominal pain, nausea, vomiting, constipation, diarrhea, urinary or bowel incontinence, other urinary symptoms such as hematuria or dysuria. No prior history of abdominal surgeries.             [place] Coma Scale Score: 15                     Patient History   Past Medical History:   Diagnosis Date    Disorder of prostate, unspecified     Prostate troubles    Essential (primary) hypertension 01/03/2014    Benign essential hypertension    Snoring     Snoring    Unspecified cataract 12/18/2015    Cataract of right eye    Unspecified cataract 12/18/2015    Cataract of left eye    Unspecified cataract 12/18/2015    Cataract of right eye    Unspecified cataract 12/18/2015    Cataract of left eye    Unspecified cataract 12/18/2015    Cataract of left eye    Unspecified cataract 12/18/2015    Cataract of right eye     Past Surgical History:   Procedure Laterality Date    CT ANGIO NECK  5/18/2017    CT NECK ANGIO W AND WO IV CONTRAST 5[place]18/2017 [facility] [place] [facility]    CT HEAD ANGIO W AND WO IV CONTRAST  5/18/2017    CT HEAD ANGIO W AND WO IV CONTRAST 5/18/2017 [facility] [place] [facility]     No family history on file.  Social History     Tobacco Use    Smoking status: Former     Types: Cigarettes    Smokeless tobacco: Never   Vaping Use    Vaping status: Never Used   Substance Use Topics    Alcohol use: Not Currently    Drug use: Never       Physical Exam   ED Triage Vitals [05/11/24 1043]   Temp Heart Rate Respirations BP   -- 64 18 (!) 183/92      Pulse Ox Temp src Heart Rate Source Patient Position   94 % -- -- --      BP Location FiO2 (%)     -- 21 %       Physical  Exam  Constitutional: Awake and alert.  No acute distress, Patient appears stated age.  Head and face:  Normocephalic, atraumatic. Pt is hard of hearing, has hearing aids in place.   Eyes: Sclera non-icteric and eye lids are without obvious rash or drooping. PERRL. EOMI  Neck:  Tracheal position is midline.   Pulmonary: CTAB. No crackles or wheezes. No gasping or shortness of breath noted. Normal respiratory movements without use of accessory muscles.   Cardiovascular: RRR. Normal S1 and S2. No murmurs, rubs, or gallops.   GI: abd soft, non-tender, non-distended, +BS  : Swelling R upper groin, soft, reducible, overlying skin without erythema  Skin: No rashes or open lesions/ulcers identified on skin.  Extremities: No peripheral edema present.   Psychiatric: Mood and affect are normal.     ED Course & MDM   ED Course as of 05/11/24 1618   Sat May 11, 2024   1258 CT abdomen pelvis w IV contrast [YH]      ED Course User Index  [YH] Meg Rosales MD         Diagnoses as of 05/11/24 1618   Bilateral inguinal hernia without obstruction or gangrene, recurrence not specified       Medical Decision Making  Patient presented to the ED with complaints of R upper groin swelling. Vitally stable. Physical exam was significant for swelling R upper groin that is soft reducible without overlying erythema. Most likely inguinal hernia with c/f possible incarceration. Also consider soft tissue infection/abscess, enlarged lymph node, aneurysm or pseudoaneurysm of the iliac or common femoral arteries. CBC, CMP, lactate, UA unremarkable. CT ab/pelvis w/ showed bilateral inguinal hernias containing fat and vasculature, no loops of bowel. Referral to general surgery placed for further work/management of hernias. Case was reviewed with the attending physician, who agrees with the plan. Patient and/or patient's representatives were counseled regarding labs, imaging, likely diagnosis, and plan. All questions were answered.     Impression:  bilateral inguinal hernias    Disposition: home with surgery follow up     Procedure  Procedures     Meg Rosales MD  Resident  05/11/24 [id number]

## 2024-05-11 NOTE — DISCHARGE INSTRUCTIONS
Dear Karely Sarah,     You were seen in the ED for pain and swelling in your right groin. We did a CT can of your abdomen and pelvis that showed bilateral inguinal hernias containing fat and vasculature but no loops of bowel. I will place a referral for outpatient surgical follow up. Please call the  scheduling line at [phone], if you do not get a call to schedule within the next week. Please return to the ED if your swelling or pain gets worse or if the area becomes firm or red, or if you develop fever, chills, abdominal pain, nausea/vomiting.

## 2024-05-12 LAB — HOLD SPECIMEN: NORMAL

## 2024-05-24 ENCOUNTER — OFFICE VISIT (OUTPATIENT)
Dept: SURGERY | Facility: CLINIC | Age: 79
End: 2024-05-24
Payer: MEDICARE

## 2024-05-24 VITALS
RESPIRATION RATE: 16 BRPM | HEIGHT: 70 IN | HEART RATE: 75 BPM | DIASTOLIC BLOOD PRESSURE: 62 MMHG | SYSTOLIC BLOOD PRESSURE: 123 MMHG | BODY MASS INDEX: 26.51 KG/M2 | WEIGHT: 185.2 LBS

## 2024-05-24 DIAGNOSIS — K40.90 INGUINAL HERNIA OF RIGHT SIDE WITHOUT OBSTRUCTION OR GANGRENE: ICD-10-CM

## 2024-05-24 DIAGNOSIS — K40.20 NON-RECURRENT BILATERAL INGUINAL HERNIA WITHOUT OBSTRUCTION OR GANGRENE: Primary | ICD-10-CM

## 2024-05-24 PROCEDURE — 99215 OFFICE O/P EST HI 40 MIN: CPT | Performed by: STUDENT IN AN ORGANIZED HEALTH CARE EDUCATION/TRAINING PROGRAM

## 2024-05-24 PROCEDURE — 1159F MED LIST DOCD IN RCRD: CPT | Performed by: STUDENT IN AN ORGANIZED HEALTH CARE EDUCATION/TRAINING PROGRAM

## 2024-05-24 PROCEDURE — 1036F TOBACCO NON-USER: CPT | Performed by: STUDENT IN AN ORGANIZED HEALTH CARE EDUCATION/TRAINING PROGRAM

## 2024-05-24 NOTE — PROGRESS NOTES
General Surgery  Clinic Visit  Subjective     Chief Complaint/Reason for Visit: Bilateral inguinal hernia    HPI:  Nadine Smith is a 79 y.o. male with history of HTN, BPH and NSCLC diagnosed a year ago s/p SBRT that finished in Sept 2023. Patient was seen by thoracic surgery and deemed not a surgical candidate due to poor PFTs. About a month ago patient noticed a bulge and pain in his right groin that continued to grow and become more painful. He went to the ED on 5/11 and had a CT performed that showed bilateral inguinal hernias R>L. He was sent home to follow up with general surgery clinic.   He states the pain is constant and has been affecting his every day life. He takes tylenol and ibuprofen that brings some relief. His mobility has also been affected by the pain. He denies nausea vomiting or constipation. He also has SOB with walking short distances.     A 12-point ROS was performed and was unremarkable except as above.    PMH:  Past Medical History:   Diagnosis Date    Disorder of prostate, unspecified     Prostate troubles    Essential (primary) hypertension 01/03/2014    Benign essential hypertension    Snoring     Snoring    Unspecified cataract 12/18/2015    Cataract of right eye    Unspecified cataract 12/18/2015    Cataract of left eye    Unspecified cataract 12/18/2015    Cataract of right eye    Unspecified cataract 12/18/2015    Cataract of left eye    Unspecified cataract 12/18/2015    Cataract of left eye    Unspecified cataract 12/18/2015    Cataract of right eye     PSH:  Past Surgical History:   Procedure Laterality Date    CT ANGIO NECK  5/18/2017    CT NECK ANGIO W AND WO IV CONTRAST 5/18/2017 [facility] [place] [facility]ERGENCY LEGACY    CT HEAD ANGIO W AND WO IV CONTRAST  5/18/2017    CT HEAD ANGIO W AND WO IV CONTRAST 5/18/2017 [facility] [place] EM[facility]GENCY LEGACY     Soc Hx:  Social History     Socioeconomic History    Marital status:      Spouse name: Not on file    Number of children: Not on file    Years  of education: Not on file    Highest education level: Not on file   Occupational History    Not on file   Tobacco Use    Smoking status: Former     Types: Cigarettes    Smokeless tobacco: Never   Vaping Use    Vaping status: Never Used   Substance and Sexual Activity    Alcohol use: Not Currently    Drug use: Never    Sexual activity: Not on file   Other Topics Concern    Not on file   Social History Narrative    Not on file     Social Determinants of Health     Financial Resource Strain: Not on file   Food Insecurity: No Food Insecurity (12/18/2023)    Hunger Vital Sign     Worried About Running Out of Food in the Last Year: Never true     Ran Out of Food in the Last Year: Never true   Transportation Needs: Not on file   Physical Activity: Not on file   Stress: Not on file   Social Connections: Not on file   Intimate Partner Violence: Not on file   Housing Stability: Not on file     Fam Hx:  No family history on file.   Allergies:  No Known Allergies  Current Medications:  Current Outpatient Medications on File Prior to Visit   Medication Sig Dispense Refill    albuterol 2.5 mg /3 mL (0.083 %) nebulizer solution TAKE 3 ML (2.5 MG) BY NEBULIZATION 4 TIMES A DAY AS NEEDED FOR WHEEZING OR SHORTNESS OF BREATH. 75 mL 3    amLODIPine (Norvasc) 10 mg tablet Take 1 tablet (10 mg) by mouth once daily.      amLODIPine (Norvasc) 2.5 mg tablet amLODIPine Besylate 2.5 MG Oral Tablet   Quantity: 30  Refills: 0        Start : 15-Dec-2014   Active      aspirin 81 mg EC tablet Take 1 tablet (81 mg) by mouth once daily.      atorvastatin (Lipitor) 10 mg tablet Atorvastatin Calcium 10 MG Oral Tablet   Quantity: 30  Refills: 0        Start : 15-Dec-2014   Active      Bevespi Aerosphere 9-4.8 mcg HFA aerosol inhaler INHALE 2 PUFFS 2 TIMES A DAY. 10.7 g 3    calcium carbonate-vitamin D3 600 mg-20 mcg (800 unit) tablet Take 1 tablet by mouth 2 times a day.      dutasteride (Avodart) 0.5 mg capsule Avodart 0.5 MG Oral Capsule   Quantity:  30  Refills: 0        Start : 19-Nov-2014   Active      finasteride (Proscar) 5 mg tablet Take 1 tablet (5 mg) by mouth once daily.      fluocinonide (Lidex) 0.05 % ointment Fluocinonide 0.05 % External Ointment   Quantity: 60  Refills: 0        Start : 1-Oct-2014   Active      Hibiclens 4 % external liquid USE AD DIRECTED FOR PREOPERATIVE SHOWER      losartan (Cozaar) 25 mg tablet Take 1 tablet (25 mg) by mouth once daily.      ProAir HFA 90 mcg/actuation inhaler ProAir  (90 Base) MCG/ACT Inhalation Aerosol Solution   Quantity: 85  Refills: 0        Start : 26-Sep-2016   Active      tamsulosin (Flomax) 0.4 mg 24 hr capsule Take 1 capsule (0.4 mg) by mouth 2 times a day. 30 minutes after the same meal each day.      umeclidinium-vilanteroL (Anoro Ellipta) 62.5-25 mcg/actuation blister with device Inhale 1 puff once daily. 1 each 5    amLODIPine (Norvasc) 5 mg tablet Take 1 tablet (5 mg) by mouth once daily.      chlorhexidine (Peridex) 0.12 % solution RINSE MOUTH WITH 15ML (1 CAPFUL) FOR 30 SECONDS AM AND PM AFTER TOOTHBRUSHING. EXPECTORATE AFTER RINSING, DO NOT SWALLOW      lisinopril 20 mg tablet Lisinopril 20 MG Oral Tablet   Quantity: 30  Refills: 0        Start : 9-Nov-2015   Active      lisinopril 40 mg tablet 0.5 tablets (20 mg) once daily.      nystatin (Mycostatin) cream Nystatin 310383 UNIT/GM External Cream   Quantity: 120  Refills: 0        Start : 26-Sep-2016   Active       No current facility-administered medications on file prior to visit.         Objective   Vitals:  Heart Rate:  [75] 75  Resp:  [16] 16  BP: (123)/(62) 123/62    Physical Exam:  GEN: No acute distress. Alert, awake and conversive.  HEENT: Sclera anicteric. Moist mucous membranes.  RESP: Breathing mildly-labored, equal chest rise. On RA.  CV: Regular rate, normotensive  GI: Abdomen soft, nondistended, tender to palpation over the right groin. Right inguinal hernia with bowel, reducable but painful to touch. No skin changes and  soft. Small hernia on the left with no bowel contents.   MSK: No gross deformities. Moves all extremities spontaneously.  NEURO: Alert and oriented x3. No focal deficits.  PSYCH: Appropriate mood and affect.      Labs:  No results found for this or any previous visit (from the past 24 hour(s)).    Recent imaging results:  CT abdomen pelvis w IV contrast    Result Date: 5/11/2024  STUDY: CT Abdomen and Pelvis with IV Contrast; 5/11/2024 2:25 PM INDICATION: Incarcerated inguinal hernia. COMPARISON: 6/10/2023 CT CAP. ACCESSION NUMBER(S): [id number] ORDERING CLINICIAN: DEMETRIO SCOTT TECHNIQUE: CT of the abdomen and pelvis was performed.  Contiguous axial images were obtained at 3 mm slice thickness through the abdomen and pelvis. Coronal and sagittal reconstructions at 3 mm slice thickness were performed.  Omnipaque 350 80 mL was administered intravenously.  FINDINGS: LOWER CHEST: No cardiomegaly.  No pericardial effusion.  Lung bases are clear.  ABDOMEN:  LIVER: No hepatomegaly.  Smooth surface contour.  Normal attenuation.  BILE DUCTS: No intrahepatic or extrahepatic biliary ductal dilatation.  GALLBLADDER: There is a gallstone, but no evidence for cholecystitis or biliary obstruction. STOMACH: No abnormalities identified.  PANCREAS: No masses or ductal dilatation.  SPLEEN: No splenomegaly or focal splenic lesion.  ADRENAL GLANDS: No thickening or nodules.  KIDNEYS AND URETERS: Kidneys are normal in size and location.  No renal or ureteral calculi.  PELVIS:  BLADDER: No abnormalities identified.  REPRODUCTIVE ORGANS: No abnormalities identified.  BOWEL: No abnormalities identified. The appendix is identified and is unremarkable.  VESSELS: No abnormalities identified.  Abdominal aorta is normal in caliber.  PERITONEUM/RETROPERITONEUM/LYMPH NODES: No free fluid.  No pneumoperitoneum. No lymphadenopathy.  ABDOMINAL WALL: No abnormalities identified. There are bilateral inguinal hernias containing fat and vasculature,  but no loops of bowel. SOFT TISSUES: No abnormalities identified.  BONES: No acute fracture or aggressive osseous lesion.    1. There are bilateral inguinal hernias containing fat and vasculature, but no loops of bowel. 2. There is a gallstone, but no evidence for cholecystitis or biliary obstruction. Signed by Dean Pereira MD      I have reviewed the imaging above as it pertains to the patient's surgical concerns and agree with the radiologist's interpretation.         ASSESSMENT  Nadine Smith is a 79 y.o. male with history of HTN, BPH and NSCLC diagnosed a year ago s/p SBRT that finished in Sept 2023. Patient was seen by thoracic surgery and deemed not a surgical candidate due to poor PFTs. About a month ago patient noticed a bulge and pain in his right groin that continued to grow and become more painful. He went to the ED on 5/11 and had a CT performed that showed bilateral inguinal hernias R>L. Patient would like his hernia fixed.     PLAN:  - Discussed with patient risks and benefits of inguinal hernia repair. Due to his lung cancer and poor PFTS he is a poor candidate for general anesthesia. We will attempt to do the hernia repair under local and MAC. Explained to patient that still puts him at risk and there is a chance he may still need a breathing tube with possibility of keeping the breathing tube after surgery.   - Will send patient to [facility] to be evaluated by anesthesia  - Discussed not operating on his left side due to being asymptomatic and trying to limit the total amount of anesthesia time, with risk that it may become symptomatic in the future.   - Patient understood and agreed to proceed with surgery  - Discussed with patient signs and symptoms of incarcerated/strangulated hernia and if there occur to go to the ED.     Patient's exam, labs, and findings discussed and seen with Dr. Mccoy, who agrees with the plan as described above.    Jn Davidson MD  PGY-5 General Surgery

## 2024-05-28 PROBLEM — K40.90 INGUINAL HERNIA OF RIGHT SIDE WITHOUT OBSTRUCTION OR GANGRENE: Status: ACTIVE | Noted: 2024-05-24

## 2024-06-13 DIAGNOSIS — J44.9 CHRONIC OBSTRUCTIVE PULMONARY DISEASE, UNSPECIFIED COPD TYPE (MULTI): ICD-10-CM

## 2024-06-14 RX ORDER — UMECLIDINIUM BROMIDE AND VILANTEROL TRIFENATATE 62.5; 25 UG/1; UG/1
1 POWDER RESPIRATORY (INHALATION) DAILY
Qty: 1 EACH | Refills: 5 | Status: SHIPPED | OUTPATIENT
Start: 2024-06-14

## 2024-06-18 ENCOUNTER — HOSPITAL ENCOUNTER (EMERGENCY)
Facility: HOSPITAL | Age: 79
Discharge: HOME | End: 2024-06-18
Attending: EMERGENCY MEDICINE
Payer: MEDICARE

## 2024-06-18 VITALS
OXYGEN SATURATION: 97 % | BODY MASS INDEX: 26.48 KG/M2 | RESPIRATION RATE: 16 BRPM | HEIGHT: 70 IN | WEIGHT: 185 LBS | SYSTOLIC BLOOD PRESSURE: 145 MMHG | DIASTOLIC BLOOD PRESSURE: 87 MMHG | TEMPERATURE: 98.6 F | HEART RATE: 82 BPM

## 2024-06-18 DIAGNOSIS — K40.30 INGUINAL HERNIA OF RIGHT SIDE WITH OBSTRUCTION AND WITHOUT GANGRENE: Primary | ICD-10-CM

## 2024-06-18 LAB
ABO GROUP (TYPE) IN BLOOD: NORMAL
ALBUMIN SERPL BCP-MCNC: 4.2 G/DL (ref 3.4–5)
ALP SERPL-CCNC: 60 U/L (ref 33–136)
ALT SERPL W P-5'-P-CCNC: 8 U/L (ref 10–52)
ANION GAP BLDV CALCULATED.4IONS-SCNC: 11 MMOL/L (ref 10–25)
ANION GAP SERPL CALC-SCNC: 14 MMOL/L (ref 10–20)
ANTIBODY SCREEN: NORMAL
APPEARANCE UR: CLEAR
APTT PPP: 27 SECONDS (ref 27–38)
AST SERPL W P-5'-P-CCNC: 14 U/L (ref 9–39)
BASE EXCESS BLDV CALC-SCNC: 1 MMOL/L (ref -2–3)
BASOPHILS # BLD AUTO: 0.05 X10*3/UL (ref 0–0.1)
BASOPHILS NFR BLD AUTO: 1.1 %
BILIRUB SERPL-MCNC: 1.3 MG/DL (ref 0–1.2)
BILIRUB UR STRIP.AUTO-MCNC: NEGATIVE MG/DL
BODY TEMPERATURE: 37 DEGREES CELSIUS
BUN SERPL-MCNC: 14 MG/DL (ref 6–23)
CA-I BLDV-SCNC: 1.16 MMOL/L (ref 1.1–1.33)
CALCIUM SERPL-MCNC: 9.2 MG/DL (ref 8.6–10.6)
CHLORIDE BLDV-SCNC: 106 MMOL/L (ref 98–107)
CHLORIDE SERPL-SCNC: 106 MMOL/L (ref 98–107)
CO2 SERPL-SCNC: 24 MMOL/L (ref 21–32)
COLOR UR: YELLOW
CREAT SERPL-MCNC: 0.9 MG/DL (ref 0.5–1.3)
EGFRCR SERPLBLD CKD-EPI 2021: 86 ML/MIN/1.73M*2
EOSINOPHIL # BLD AUTO: 0.1 X10*3/UL (ref 0–0.4)
EOSINOPHIL NFR BLD AUTO: 2.2 %
ERYTHROCYTE [DISTWIDTH] IN BLOOD BY AUTOMATED COUNT: 15.2 % (ref 11.5–14.5)
GLUCOSE BLDV-MCNC: 85 MG/DL (ref 74–99)
GLUCOSE SERPL-MCNC: 76 MG/DL (ref 74–99)
GLUCOSE UR STRIP.AUTO-MCNC: NORMAL MG/DL
HCO3 BLDV-SCNC: 26 MMOL/L (ref 22–26)
HCT VFR BLD AUTO: 39.2 % (ref 41–52)
HCT VFR BLD EST: 42 % (ref 41–52)
HGB BLD-MCNC: 13.4 G/DL (ref 13.5–17.5)
HGB BLDV-MCNC: 14.1 G/DL (ref 13.5–17.5)
IMM GRANULOCYTES # BLD AUTO: 0.01 X10*3/UL (ref 0–0.5)
IMM GRANULOCYTES NFR BLD AUTO: 0.2 % (ref 0–0.9)
INHALED O2 CONCENTRATION: 21 %
INR PPP: 1 (ref 0.9–1.1)
KETONES UR STRIP.AUTO-MCNC: ABNORMAL MG/DL
LACTATE BLDV-SCNC: 1.1 MMOL/L (ref 0.4–2)
LEUKOCYTE ESTERASE UR QL STRIP.AUTO: NEGATIVE
LIPASE SERPL-CCNC: 9 U/L (ref 9–82)
LYMPHOCYTES # BLD AUTO: 0.7 X10*3/UL (ref 0.8–3)
LYMPHOCYTES NFR BLD AUTO: 15.3 %
MCH RBC QN AUTO: 26.3 PG (ref 26–34)
MCHC RBC AUTO-ENTMCNC: 34.2 G/DL (ref 32–36)
MCV RBC AUTO: 77 FL (ref 80–100)
MONOCYTES # BLD AUTO: 0.32 X10*3/UL (ref 0.05–0.8)
MONOCYTES NFR BLD AUTO: 7 %
MUCOUS THREADS #/AREA URNS AUTO: NORMAL /LPF
NEUTROPHILS # BLD AUTO: 3.39 X10*3/UL (ref 1.6–5.5)
NEUTROPHILS NFR BLD AUTO: 74.2 %
NITRITE UR QL STRIP.AUTO: NEGATIVE
NRBC BLD-RTO: 0 /100 WBCS (ref 0–0)
OXYHGB MFR BLDV: 72.4 % (ref 45–75)
PCO2 BLDV: 42 MM HG (ref 41–51)
PH BLDV: 7.4 PH (ref 7.33–7.43)
PH UR STRIP.AUTO: 5.5 [PH]
PLATELET # BLD AUTO: 202 X10*3/UL (ref 150–450)
PO2 BLDV: 43 MM HG (ref 35–45)
POTASSIUM BLDV-SCNC: 4.2 MMOL/L (ref 3.5–5.3)
POTASSIUM SERPL-SCNC: 3.6 MMOL/L (ref 3.5–5.3)
PROT SERPL-MCNC: 8.3 G/DL (ref 6.4–8.2)
PROT UR STRIP.AUTO-MCNC: ABNORMAL MG/DL
PROTHROMBIN TIME: 11.8 SECONDS (ref 9.8–12.8)
RBC # BLD AUTO: 5.09 X10*6/UL (ref 4.5–5.9)
RBC # UR STRIP.AUTO: NEGATIVE /UL
RBC #/AREA URNS AUTO: NORMAL /HPF
RH FACTOR (ANTIGEN D): NORMAL
SAO2 % BLDV: 74 % (ref 45–75)
SODIUM BLDV-SCNC: 139 MMOL/L (ref 136–145)
SODIUM SERPL-SCNC: 140 MMOL/L (ref 136–145)
SP GR UR STRIP.AUTO: 1.02
UROBILINOGEN UR STRIP.AUTO-MCNC: NORMAL MG/DL
WBC # BLD AUTO: 4.6 X10*3/UL (ref 4.4–11.3)
WBC #/AREA URNS AUTO: NORMAL /HPF

## 2024-06-18 PROCEDURE — 84132 ASSAY OF SERUM POTASSIUM: CPT | Performed by: EMERGENCY MEDICINE

## 2024-06-18 PROCEDURE — 84132 ASSAY OF SERUM POTASSIUM: CPT

## 2024-06-18 PROCEDURE — 96374 THER/PROPH/DIAG INJ IV PUSH: CPT

## 2024-06-18 PROCEDURE — 85610 PROTHROMBIN TIME: CPT | Performed by: EMERGENCY MEDICINE

## 2024-06-18 PROCEDURE — 99284 EMERGENCY DEPT VISIT MOD MDM: CPT

## 2024-06-18 PROCEDURE — 36415 COLL VENOUS BLD VENIPUNCTURE: CPT

## 2024-06-18 PROCEDURE — 36415 COLL VENOUS BLD VENIPUNCTURE: CPT | Performed by: EMERGENCY MEDICINE

## 2024-06-18 PROCEDURE — 2500000004 HC RX 250 GENERAL PHARMACY W/ HCPCS (ALT 636 FOR OP/ED)

## 2024-06-18 PROCEDURE — 85730 THROMBOPLASTIN TIME PARTIAL: CPT | Performed by: EMERGENCY MEDICINE

## 2024-06-18 PROCEDURE — 83690 ASSAY OF LIPASE: CPT

## 2024-06-18 PROCEDURE — 99285 EMERGENCY DEPT VISIT HI MDM: CPT

## 2024-06-18 PROCEDURE — 96375 TX/PRO/DX INJ NEW DRUG ADDON: CPT

## 2024-06-18 PROCEDURE — 86901 BLOOD TYPING SEROLOGIC RH(D): CPT | Performed by: EMERGENCY MEDICINE

## 2024-06-18 PROCEDURE — 81003 URINALYSIS AUTO W/O SCOPE: CPT

## 2024-06-18 PROCEDURE — 85025 COMPLETE CBC W/AUTO DIFF WBC: CPT

## 2024-06-18 RX ORDER — MORPHINE SULFATE 4 MG/ML
4 INJECTION INTRAVENOUS ONCE
Status: COMPLETED | OUTPATIENT
Start: 2024-06-18 | End: 2024-06-18

## 2024-06-18 RX ORDER — HYDROMORPHONE HYDROCHLORIDE 1 MG/ML
0.4 INJECTION, SOLUTION INTRAMUSCULAR; INTRAVENOUS; SUBCUTANEOUS ONCE
Status: COMPLETED | OUTPATIENT
Start: 2024-06-18 | End: 2024-06-18

## 2024-06-18 RX ORDER — KETOROLAC TROMETHAMINE 15 MG/ML
15 INJECTION, SOLUTION INTRAMUSCULAR; INTRAVENOUS ONCE
Status: COMPLETED | OUTPATIENT
Start: 2024-06-18 | End: 2024-06-18

## 2024-06-18 ASSESSMENT — PAIN DESCRIPTION - ORIENTATION: ORIENTATION: RIGHT

## 2024-06-18 ASSESSMENT — PAIN SCALES - GENERAL
PAINLEVEL_OUTOF10: 10 - WORST POSSIBLE PAIN

## 2024-06-18 ASSESSMENT — PAIN DESCRIPTION - LOCATION: LOCATION: ABDOMEN

## 2024-06-18 ASSESSMENT — COLUMBIA-SUICIDE SEVERITY RATING SCALE - C-SSRS
1. IN THE PAST MONTH, HAVE YOU WISHED YOU WERE DEAD OR WISHED YOU COULD GO TO SLEEP AND NOT WAKE UP?: NO
2. HAVE YOU ACTUALLY HAD ANY THOUGHTS OF KILLING YOURSELF?: NO
6. HAVE YOU EVER DONE ANYTHING, STARTED TO DO ANYTHING, OR PREPARED TO DO ANYTHING TO END YOUR LIFE?: NO

## 2024-06-18 ASSESSMENT — PAIN - FUNCTIONAL ASSESSMENT: PAIN_FUNCTIONAL_ASSESSMENT: 0-10

## 2024-06-18 NOTE — ED PROVIDER NOTES
HPI   Chief Complaint   Patient presents with   • Groin Pain   • Groin Swelling   This is an 79-year-old male with a past medical history significant for hypertension and BPH who presents to the ED with groin pain.  Patient states that he has bilateral inguinal hernias that were diagnosed at this ED on 5/11/24. He is scheduled to have the hernias surgically repaired next month on 7/15.  He reports increasing pain and swelling on the right side that began yesterday.  Denies any falls or traumas, heavy lifting or changes in bowel or bladder. States he had a normal bowel movement yesterday. No history of prior abdominal surgeries.     Denies any fevers, chills, chest pain, shortness of breath, abdominal pain, N/V/D or constipation    Limitations to history: None  Independent Historians: Patient  External Records Reviewed: Prior ED notes, surgical notes    Patient History   Past Medical History:   Diagnosis Date   • Disorder of prostate, unspecified     Prostate troubles   • Essential (primary) hypertension 01/03/2014    Benign essential hypertension   • Snoring     Snoring   • Unspecified cataract 12/18/2015    Cataract of right eye   • Unspecified cataract 12/18/2015    Cataract of left eye   • Unspecified cataract 12/18/2015    Cataract of right eye   • Unspecified cataract 12/18/2015    Cataract of left eye   • Unspecified cataract 12/18/2015    Cataract of left eye   • Unspecified cataract 12/18/2015    Cataract of right eye     Past Surgical History:   Procedure Laterality Date   • CT ANGIO NECK  5/18/2017    CT NECK ANGIO W AND WO IV CONTRAST 5/18/2017 [facility] [place] [facility]GENCY LEGAC[facility]   • CT HEAD ANGIO W AND WO IV CONTRAST  5/18/2017    CT HEAD ANGIO W AND WO IV CONTRAST 5/18/2017 [facility] [place] [facility]     No family history on file.  Social History     Tobacco Use   • Smoking status: Former     Types: Cigarettes   • Smokeless tobacco: Never   Vaping Use   • Vaping status: Never Used   Substance Use Topics   • Alcohol use: Not  Currently   • Drug use: Never       Physical Exam   ED Triage Vitals [06/18/24 1406]   Temperature Heart Rate Respirations BP   37 °C (98.6 °F) 90 16 151/90      Pulse Ox Temp src Heart Rate Source Patient Position   96 % -- -- --      BP Location FiO2 (%)     -- --       Physical Exam  Constitutional:       General: He is not in acute distress.     Appearance: He is not ill-appearing.   Cardiovascular:      Rate and Rhythm: Normal rate and regular rhythm.      Heart sounds: Normal heart sounds.   Pulmonary:      Effort: Pulmonary effort is normal. No respiratory distress.      Breath sounds: Normal breath sounds. No wheezing, rhonchi or rales.   Abdominal:      General: Bowel sounds are normal.      Palpations: Abdomen is soft.      Tenderness: There is no guarding or rebound.      Hernia: A hernia is present. Hernia is present in the right inguinal area.      Comments: Large, firm, right sided inguinal hernia without overlying erythema or increased warmth. Unable to manually reduce   Musculoskeletal:         General: Normal range of motion.   Skin:     General: Skin is warm and dry.      Capillary Refill: Capillary refill takes less than 2 seconds.   Neurological:      General: No focal deficit present.      Mental Status: He is alert and oriented to person, place, and time.         ED Course & MDM   Diagnoses as of 06/18/24 1802   Inguinal hernia of right side with obstruction and without gangrene       Medical Decision Making  This is an 79-year-old male with a past medical history significant for hypertension and BPH who presents to the ED with bilateral inguinal hernias and increasing pain and swelling on the right side since yesterday.  Patient is scheduled for a surgical repair on 7/15/24. Denies any changes in bowel or bladder, states he had a normal bowel movement yesterday.     On physical exam, patient is overall well-appearing and in no acute distress.  There is a large, firm, right-sided inguinal  hernia present.  No overlying erythema or increased warmth.  Unable to manually reduce the bedside.    Lab work does not show any anemia, leukocytosis or electrolyte disturbances.  Lipase was normal.  Coagulation screen normal.  Urinalysis is nonconcerning for UTI.   Consult placed to general surgery given hernia was unable to be reduced. Surgery team saw patient in the ED and manually reduce the hernia at the bedside.  They recommended to PO challenge the patient and discharge with plan to keep scheduled surgery on 7/15/24.    Patient tolerated PO challenge well.  He has remained hemodynamically stable throughout his ED assessment is appropriate for discharge.  Directed patient to return to the ED if he experiences increased pain, changes in bowel or bladder or any other concerning symptoms. Patient verbalized understanding and was agreeable with plan of care.  Discharged in stable condition.     Labs Reviewed   CBC WITH AUTO DIFFERENTIAL - Abnormal       Result Value    WBC 4.6      nRBC 0.0      RBC 5.09      Hemoglobin 13.4 (*)     Hematocrit 39.2 (*)     MCV 77 (*)     MCH 26.3      MCHC 34.2      RDW 15.2 (*)     Platelets 202      Neutrophils % 74.2      Immature Granulocytes %, Automated 0.2      Lymphocytes % 15.3      Monocytes % 7.0      Eosinophils % 2.2      Basophils % 1.1      Neutrophils Absolute 3.39      Immature Granulocytes Absolute, Automated 0.01      Lymphocytes Absolute 0.70 (*)     Monocytes Absolute 0.32      Eosinophils Absolute 0.10      Basophils Absolute 0.05     COMPREHENSIVE METABOLIC PANEL - Abnormal    Glucose 76      Sodium 140      Potassium 3.6      Chloride 106      Bicarbonate 24      Anion Gap 14      Urea Nitrogen 14      Creatinine 0.90      eGFR 86      Calcium 9.2      Albumin 4.2      Alkaline Phosphatase 60      Total Protein 8.3 (*)     AST 14      Bilirubin, Total 1.3 (*)     ALT 8 (*)    URINALYSIS WITH REFLEX CULTURE AND MICROSCOPIC - Abnormal    Color, Urine Yellow       Appearance, Urine Clear      Specific Gravity, Urine 1.020      pH, Urine 5.5      Protein, Urine 10 (TRACE)      Glucose, Urine Normal      Blood, Urine NEGATIVE      Ketones, Urine TRACE (*)     Bilirubin, Urine NEGATIVE      Urobilinogen, Urine Normal      Nitrite, Urine NEGATIVE      Leukocyte Esterase, Urine NEGATIVE     LIPASE - Normal    Lipase 9      Narrative:     Venipuncture immediately after or during the administration of Metamizole may lead to falsely low results. Testing should be performed immediately prior to Metamizole dosing.   PROTIME-INR - Normal    Protime 11.8      INR 1.0     APTT - Normal    aPTT 27      Narrative:     The APTT is no longer used for monitoring Unfractionated Heparin Therapy. For monitoring Heparin Therapy, use the Heparin Assay.   BLOOD GAS VENOUS FULL PANEL    POCT pH, Venous 7.40      POCT pCO2, Venous 42      POCT pO2, Venous 43      POCT SO2, Venous 74      POCT Oxy Hemoglobin, Venous 72.4      POCT Hematocrit Calculated, Venous 42.0      POCT Sodium, Venous 139      POCT Potassium, Venous 4.2      POCT Chloride, Venous 106      POCT Ionized Calicum, Venous 1.16      POCT Glucose, Venous 85      POCT Lactate, Venous 1.1      POCT Base Excess, Venous 1.0      POCT HCO3 Calculated, Venous 26.0      POCT Hemoglobin, Venous 14.1      POCT Anion Gap, Venous 11.0      Patient Temperature 37.0      FiO2 21     URINALYSIS WITH REFLEX CULTURE AND MICROSCOPIC    Narrative:     The following orders were created for panel order Urinalysis with Reflex Culture and Microscopic.  Procedure                               Abnormality         Status                     ---------                               -----------         ------                     Urinalysis with Reflex C...[[id number]]  Abnormal            Final result               Extra Urine Gray Tube[[id number]]                            In process                   Please view results for these tests on the individual  orders.   EXTRA URINE GRAY TUBE   TYPE AND SCREEN   URINALYSIS MICROSCOPIC WITH REFLEX CULTURE    WBC, Urine 1-5      RBC, Urine NONE      Mucus, Urine 2+               Kell Guzman PA-C  06/18/24 5664

## 2024-06-18 NOTE — ED TRIAGE NOTES
Pt to ED c/o R upper groin pain and swelling. He states he first noticed it approximately 1 week ago. Pain worsens when he bends over. He denies n/v. Denies  complaints. Patient seen for this back in 5/11 and was scheduled for surgery next month. Patient states that the pain has worsened and he cannot wait. Patient has hx of lung cancer and had radiation last year.

## 2024-06-18 NOTE — CONSULTS
Reason For Consult  Right inguinal hernia    History Of Present Illness  Nadine Smith is a 79 y.o. male with history of HTN, BPH and NSCLC diagnosed a year ago s/p SBRT that finished in Sept 2023 (seen by thoracic surgery and deemed not a surgical candidate due to poor PFTs) who presents to the ED where [facility] is consulted for concern of an incarcerated right inguinal hernia.     Patient reports about one month ago she started to notice hernia at his right groin. States that initially it was spontaneously reducible but progressively has started to get worse. Has had to manually reduce more recently. Patient says he woke up yesterday morning with the hernia and has not been able to reduce it since. Endorses pain but no overlying skin changes. Denies fevers, chills, chest pain, SOB, N/V, abdominal pain, dysuria, constipation, or diarrhea.     Of note patient was seen in the ED on 5/11/24 for the R inguinal hernia which was reducible at that time. CT A/P showed bilateral inguinal hernias. Left side is asymptomatic. Was seen by Dr. Mccoy on 5/24/24 to discuss repair. He offered a right inguinal hernia repair under MAC/local scheduled on 7/15/24 after CPM evaluation by anesthesia because he is a poor candidate for general anesthesia due to his lung cancer and poor PFTS.     Past Medical History  He has a past medical history of Disorder of prostate, unspecified, Essential (primary) hypertension (01/03/2014), Snoring, Unspecified cataract (12/18/2015), Unspecified cataract (12/18/2015), Unspecified cataract (12/18/2015), Unspecified cataract (12/18/2015), Unspecified cataract (12/18/2015), and Unspecified cataract (12/18/2015).    Surgical History  He has a past surgical history that includes CT angio head w and wo IV contrast (5/18/2017) and CT angio neck (5/18/2017).     Social History  He reports that he has quit smoking. His smoking use included cigarettes. He has never used smokeless tobacco. He  "reports that he does not currently use alcohol. He reports that he does not use drugs.  1/2 PPD since youth until 05/2023     Family History  No family history on file.     Allergies  Patient has no known allergies.    Review of Systems  As stated in HPI, otherwise negative.     Physical Exam  Gen: nad, uncomfortable  Cardiac: nontachycardic  Pulm: nonlabored on room air  GI: soft, nontender, nondistended  : right inguinal hernia firm and tender to palpation without overlying skin changes  MSK: MIREILLE  Extremities: no pitting edema  Skin: warm, dry     Last Recorded Vitals  Blood pressure 151/90, pulse 90, temperature 37 °C (98.6 °F), resp. rate 16, height 1.778 m (5' 10\"), weight 83.9 kg (185 lb), SpO2 96%.    Relevant Results  Results for orders placed or performed during the hospital encounter of 06/18/24 (from the past 24 hour(s))   CBC and Auto Differential   Result Value Ref Range    WBC 4.6 4.4 - 11.3 x10*3/uL    nRBC 0.0 0.0 - 0.0 /100 WBCs    RBC 5.09 4.50 - 5.90 x10*6/uL    Hemoglobin 13.4 (L) 13.5 - 17.5 g/dL    Hematocrit 39.2 (L) 41.0 - 52.0 %    MCV 77 (L) 80 - 100 fL    MCH 26.3 26.0 - 34.0 pg    MCHC 34.2 32.0 - 36.0 g/dL    RDW 15.2 (H) 11.5 - 14.5 %    Platelets 202 150 - 450 x10*3/uL    Neutrophils % 74.2 40.0 - 80.0 %    Immature Granulocytes %, Automated 0.2 0.0 - 0.9 %    Lymphocytes % 15.3 13.0 - 44.0 %    Monocytes % 7.0 2.0 - 10.0 %    Eosinophils % 2.2 0.0 - 6.0 %    Basophils % 1.1 0.0 - 2.0 %    Neutrophils Absolute 3.39 1.60 - 5.50 x10*3/uL    Immature Granulocytes Absolute, Automated 0.01 0.00 - 0.50 x10*3/uL    Lymphocytes Absolute 0.70 (L) 0.80 - 3.00 x10*3/uL    Monocytes Absolute 0.32 0.05 - 0.80 x10*3/uL    Eosinophils Absolute 0.10 0.00 - 0.40 x10*3/uL    Basophils Absolute 0.05 0.00 - 0.10 x10*3/uL   Comprehensive metabolic panel   Result Value Ref Range    Glucose 76 74 - 99 mg/dL    Sodium 140 136 - 145 mmol/L    Potassium 3.6 3.5 - 5.3 mmol/L    Chloride 106 98 - 107 mmol/L    " Bicarbonate 24 21 - 32 mmol/L    Anion Gap 14 10 - 20 mmol/L    Urea Nitrogen 14 6 - 23 mg/dL    Creatinine 0.90 0.50 - 1.30 mg/dL    eGFR 86 >60 mL/min/1.73m*2    Calcium 9.2 8.6 - 10.6 mg/dL    Albumin 4.2 3.4 - 5.0 g/dL    Alkaline Phosphatase 60 33 - 136 U/L    Total Protein 8.3 (H) 6.4 - 8.2 g/dL    AST 14 9 - 39 U/L    Bilirubin, Total 1.3 (H) 0.0 - 1.2 mg/dL    ALT 8 (L) 10 - 52 U/L   Lipase   Result Value Ref Range    Lipase 9 9 - 82 U/L   Urinalysis with Reflex Culture and Microscopic   Result Value Ref Range    Color, Urine Yellow Light-Yellow, Yellow, Dark-Yellow    Appearance, Urine Clear Clear    Specific Gravity, Urine 1.020 1.005 - 1.035    pH, Urine 5.5 5.0, 5.5, 6.0, 6.5, 7.0, 7.5, 8.0    Protein, Urine 10 (TRACE) NEGATIVE, 10 (TRACE), 20 (TRACE) mg/dL    Glucose, Urine Normal Normal mg/dL    Blood, Urine NEGATIVE NEGATIVE    Ketones, Urine TRACE (A) NEGATIVE mg/dL    Bilirubin, Urine NEGATIVE NEGATIVE    Urobilinogen, Urine Normal Normal mg/dL    Nitrite, Urine NEGATIVE NEGATIVE    Leukocyte Esterase, Urine NEGATIVE NEGATIVE   Urinalysis Microscopic   Result Value Ref Range    WBC, Urine 1-5 1-5, NONE /HPF    RBC, Urine NONE NONE, 1-2, 3-5 /HPF    Mucus, Urine 2+ Reference range not established. /LPF   Blood Gas Venous Full Panel   Result Value Ref Range    POCT pH, Venous 7.40 7.33 - 7.43 pH    POCT pCO2, Venous 42 41 - 51 mm Hg    POCT pO2, Venous 43 35 - 45 mm Hg    POCT SO2, Venous 74 45 - 75 %    POCT Oxy Hemoglobin, Venous 72.4 45.0 - 75.0 %    POCT Hematocrit Calculated, Venous 42.0 41.0 - 52.0 %    POCT Sodium, Venous 139 136 - 145 mmol/L    POCT Potassium, Venous 4.2 3.5 - 5.3 mmol/L    POCT Chloride, Venous 106 98 - 107 mmol/L    POCT Ionized Calicum, Venous 1.16 1.10 - 1.33 mmol/L    POCT Glucose, Venous 85 74 - 99 mg/dL    POCT Lactate, Venous 1.1 0.4 - 2.0 mmol/L    POCT Base Excess, Venous 1.0 -2.0 - 3.0 mmol/L    POCT HCO3 Calculated, Venous 26.0 22.0 - 26.0 mmol/L    POCT  Hemoglobin, Venous 14.1 13.5 - 17.5 g/dL    POCT Anion Gap, Venous 11.0 10.0 - 25.0 mmol/L    Patient Temperature 37.0 degrees Celsius    FiO2 21 %   Protime-INR   Result Value Ref Range    Protime 11.8 9.8 - 12.8 seconds    INR 1.0 0.9 - 1.1   aPTT   Result Value Ref Range    aPTT 27 27 - 38 seconds        Assessment/Plan   79 y.o. male with history of HTN, BPH and NSCLC diagnosed a year ago s/p SBRT that finished in Sept 2023 (seen by thoracic surgery and deemed not a surgical candidate due to poor PFTs) who presents to the ED where [facility] is consulted for concern of an incarcerated right inguinal hernia.     No signs of obstructive symptoms or strangulation. Patient was given pain medications and right inguinal hernia was reduced at bedside.    Recommendations:  - PO challenge before discharge  - follow up at pre op testing visit and planned right inguinal hernia repair with Dr. Mccoy on 7/15/24  - patient educated on signs and symptoms to return to ED    Seen with Dr. Tarango. Discussed with Dr. Mccoy.    Sveta Rowland MD  [facility]  [id number]

## 2024-06-18 NOTE — H&P (VIEW-ONLY)
Reason For Consult  Right inguinal hernia    History Of Present Illness  Nadine Smith is a 79 y.o. male with history of HTN, BPH and NSCLC diagnosed a year ago s/p SBRT that finished in Sept 2023 (seen by thoracic surgery and deemed not a surgical candidate due to poor PFTs) who presents to the ED where [facility] is consulted for concern of an incarcerated right inguinal hernia.     Patient reports about one month ago she started to notice hernia at his right groin. States that initially it was spontaneously reducible but progressively has started to get worse. Has had to manually reduce more recently. Patient says he woke up yesterday morning with the hernia and has not been able to reduce it since. Endorses pain but no overlying skin changes. Denies fevers, chills, chest pain, SOB, N/V, abdominal pain, dysuria, constipation, or diarrhea.     Of note patient was seen in the ED on 5/11/24 for the R inguinal hernia which was reducible at that time. CT A/P showed bilateral inguinal hernias. Left side is asymptomatic. Was seen by Dr. Mccoy on 5/24/24 to discuss repair. He offered a right inguinal hernia repair under MAC/local scheduled on 7/15/24 after CPM evaluation by anesthesia because he is a poor candidate for general anesthesia due to his lung cancer and poor PFTS.     Past Medical History  He has a past medical history of Disorder of prostate, unspecified, Essential (primary) hypertension (01/03/2014), Snoring, Unspecified cataract (12/18/2015), Unspecified cataract (12/18/2015), Unspecified cataract (12/18/2015), Unspecified cataract (12/18/2015), Unspecified cataract (12/18/2015), and Unspecified cataract (12/18/2015).    Surgical History  He has a past surgical history that includes CT angio head w and wo IV contrast (5/18/2017) and CT angio neck (5/18/2017).     Social History  He reports that he has quit smoking. His smoking use included cigarettes. He has never used smokeless tobacco. He  "reports that he does not currently use alcohol. He reports that he does not use drugs.  1/2 PPD since youth until 05/2023     Family History  No family history on file.     Allergies  Patient has no known allergies.    Review of Systems  As stated in HPI, otherwise negative.     Physical Exam  Gen: nad, uncomfortable  Cardiac: nontachycardic  Pulm: nonlabored on room air  GI: soft, nontender, nondistended  : right inguinal hernia firm and tender to palpation without overlying skin changes  MSK: MIREILLE  Extremities: no pitting edema  Skin: warm, dry     Last Recorded Vitals  Blood pressure 151/90, pulse 90, temperature 37 °C (98.6 °F), resp. rate 16, height 1.778 m (5' 10\"), weight 83.9 kg (185 lb), SpO2 96%.    Relevant Results  Results for orders placed or performed during the hospital encounter of 06/18/24 (from the past 24 hour(s))   CBC and Auto Differential   Result Value Ref Range    WBC 4.6 4.4 - 11.3 x10*3/uL    nRBC 0.0 0.0 - 0.0 /100 WBCs    RBC 5.09 4.50 - 5.90 x10*6/uL    Hemoglobin 13.4 (L) 13.5 - 17.5 g/dL    Hematocrit 39.2 (L) 41.0 - 52.0 %    MCV 77 (L) 80 - 100 fL    MCH 26.3 26.0 - 34.0 pg    MCHC 34.2 32.0 - 36.0 g/dL    RDW 15.2 (H) 11.5 - 14.5 %    Platelets 202 150 - 450 x10*3/uL    Neutrophils % 74.2 40.0 - 80.0 %    Immature Granulocytes %, Automated 0.2 0.0 - 0.9 %    Lymphocytes % 15.3 13.0 - 44.0 %    Monocytes % 7.0 2.0 - 10.0 %    Eosinophils % 2.2 0.0 - 6.0 %    Basophils % 1.1 0.0 - 2.0 %    Neutrophils Absolute 3.39 1.60 - 5.50 x10*3/uL    Immature Granulocytes Absolute, Automated 0.01 0.00 - 0.50 x10*3/uL    Lymphocytes Absolute 0.70 (L) 0.80 - 3.00 x10*3/uL    Monocytes Absolute 0.32 0.05 - 0.80 x10*3/uL    Eosinophils Absolute 0.10 0.00 - 0.40 x10*3/uL    Basophils Absolute 0.05 0.00 - 0.10 x10*3/uL   Comprehensive metabolic panel   Result Value Ref Range    Glucose 76 74 - 99 mg/dL    Sodium 140 136 - 145 mmol/L    Potassium 3.6 3.5 - 5.3 mmol/L    Chloride 106 98 - 107 mmol/L    " Bicarbonate 24 21 - 32 mmol/L    Anion Gap 14 10 - 20 mmol/L    Urea Nitrogen 14 6 - 23 mg/dL    Creatinine 0.90 0.50 - 1.30 mg/dL    eGFR 86 >60 mL/min/1.73m*2    Calcium 9.2 8.6 - 10.6 mg/dL    Albumin 4.2 3.4 - 5.0 g/dL    Alkaline Phosphatase 60 33 - 136 U/L    Total Protein 8.3 (H) 6.4 - 8.2 g/dL    AST 14 9 - 39 U/L    Bilirubin, Total 1.3 (H) 0.0 - 1.2 mg/dL    ALT 8 (L) 10 - 52 U/L   Lipase   Result Value Ref Range    Lipase 9 9 - 82 U/L   Urinalysis with Reflex Culture and Microscopic   Result Value Ref Range    Color, Urine Yellow Light-Yellow, Yellow, Dark-Yellow    Appearance, Urine Clear Clear    Specific Gravity, Urine 1.020 1.005 - 1.035    pH, Urine 5.5 5.0, 5.5, 6.0, 6.5, 7.0, 7.5, 8.0    Protein, Urine 10 (TRACE) NEGATIVE, 10 (TRACE), 20 (TRACE) mg/dL    Glucose, Urine Normal Normal mg/dL    Blood, Urine NEGATIVE NEGATIVE    Ketones, Urine TRACE (A) NEGATIVE mg/dL    Bilirubin, Urine NEGATIVE NEGATIVE    Urobilinogen, Urine Normal Normal mg/dL    Nitrite, Urine NEGATIVE NEGATIVE    Leukocyte Esterase, Urine NEGATIVE NEGATIVE   Urinalysis Microscopic   Result Value Ref Range    WBC, Urine 1-5 1-5, NONE /HPF    RBC, Urine NONE NONE, 1-2, 3-5 /HPF    Mucus, Urine 2+ Reference range not established. /LPF   Blood Gas Venous Full Panel   Result Value Ref Range    POCT pH, Venous 7.40 7.33 - 7.43 pH    POCT pCO2, Venous 42 41 - 51 mm Hg    POCT pO2, Venous 43 35 - 45 mm Hg    POCT SO2, Venous 74 45 - 75 %    POCT Oxy Hemoglobin, Venous 72.4 45.0 - 75.0 %    POCT Hematocrit Calculated, Venous 42.0 41.0 - 52.0 %    POCT Sodium, Venous 139 136 - 145 mmol/L    POCT Potassium, Venous 4.2 3.5 - 5.3 mmol/L    POCT Chloride, Venous 106 98 - 107 mmol/L    POCT Ionized Calicum, Venous 1.16 1.10 - 1.33 mmol/L    POCT Glucose, Venous 85 74 - 99 mg/dL    POCT Lactate, Venous 1.1 0.4 - 2.0 mmol/L    POCT Base Excess, Venous 1.0 -2.0 - 3.0 mmol/L    POCT HCO3 Calculated, Venous 26.0 22.0 - 26.0 mmol/L    POCT  Hemoglobin, Venous 14.1 13.5 - 17.5 g/dL    POCT Anion Gap, Venous 11.0 10.0 - 25.0 mmol/L    Patient Temperature 37.0 degrees Celsius    FiO2 21 %   Protime-INR   Result Value Ref Range    Protime 11.8 9.8 - 12.8 seconds    INR 1.0 0.9 - 1.1   aPTT   Result Value Ref Range    aPTT 27 27 - 38 seconds        Assessment/Plan   79 y.o. male with history of HTN, BPH and NSCLC diagnosed a year ago s/p SBRT that finished in Sept 2023 (seen by thoracic surgery and deemed not a surgical candidate due to poor PFTs) who presents to the ED where [facility] is consulted for concern of an incarcerated right inguinal hernia.     No signs of obstructive symptoms or strangulation. Patient was given pain medications and right inguinal hernia was reduced at bedside.    Recommendations:  - PO challenge before discharge  - follow up at pre op testing visit and planned right inguinal hernia repair with Dr. Mccoy on 7/15/24  - patient educated on signs and symptoms to return to ED    Seen with Dr. Tarango. Discussed with Dr. Mccoy.    Sveta Rowland MD  [facility]  [id number]

## 2024-06-18 NOTE — DISCHARGE INSTRUCTIONS
Return to the ED if you experience any worsening pain, nausea, vomiting, inability to use the restroom, or any other concerning symptoms.

## 2024-06-19 LAB — HOLD SPECIMEN: NORMAL

## 2024-06-21 ENCOUNTER — CLINICAL SUPPORT (OUTPATIENT)
Dept: PREADMISSION TESTING | Facility: HOSPITAL | Age: 79
End: 2024-06-21
Payer: MEDICARE

## 2024-06-21 NOTE — CPM/PAT NURSE NOTE
CPM/PAT Nurse Note      Name: Nadine Smith (Nadine Smith)  /Age: 1945/79 y.o.       Past Medical History:   Diagnosis Date    Bilateral inguinal hernia     R>L    BPH (benign prostatic hyperplasia)     Cataract     COPD (chronic obstructive pulmonary disease) (Multi)     PFTs with severe obstruction. Echo with normal EF.  Continue bevespi and albuterol    COVID-19 10/2021    Disorder of prostate, unspecified     Prostate troubles    Hypertension     NSCLC of right lung (Multi)     s/p SBRT that finished in 2023 (2023-2023)    Snoring     Snoring       Past Surgical History:   Procedure Laterality Date    BRONCHOSCOPY      Bronchoscopy with biopsy showed adenocarcinoma.    COLONOSCOPY      CT ANGIO NECK  2017    CT NECK ANGIO W AND WO IV CONTRAST 2017 [facility] [place] [place] [facility]    CT HEAD ANGIO W AND WO IV CONTRAST  2017    CT HEAD ANGIO W AND WO IV CONTRAST 2017 [facility] [place] [place] [facility]       Patient  has no history on file for sexual activity.    No family history on file.    No Known Allergies    Prior to Admission medications    Medication Sig Start Date End Date Taking? Authorizing Provider   albuterol 2.5 mg /3 mL (0.083 %) nebulizer solution TAKE 3 ML (2.5 MG) BY NEBULIZATION 4 TIMES A DAY AS NEEDED FOR WHEEZING OR SHORTNESS OF BREATH. 24  Maverick Friend, APRN-CNP   amLODIPine (Norvasc) 10 mg tablet Take 1 tablet (10 mg) by mouth once daily. 23   Historical Provider, MD   amLODIPine (Norvasc) 2.5 mg tablet amLODIPine Besylate 2.5 MG Oral Tablet   Quantity: 30  Refills: 0        Start : 15-Dec-2014   Active 12/15/14   Historical Provider, MD   amLODIPine (Norvasc) 5 mg tablet Take 1 tablet (5 mg) by mouth once daily.    Historical Provider, MD   aspirin 81 mg EC tablet Take 1 tablet (81 mg) by mouth once daily. 5/3/17   Historical Provider, MD   atorvastatin (Lipitor) 10 mg tablet Atorvastatin Calcium 10 MG Oral Tablet   Quantity: 30  Refills:  0        Start : 15-Dec-2014   Active 12/15/14   Historical Provider, MD Hdezvesalanna Aerosphere 9-4.8 mcg HFA aerosol inhaler INHALE 2 PUFFS 2 TIMES A DAY. 1/3/24   Gabbie Sutton, APRN-CNP   calcium carbonate-vitamin D3 600 mg-20 mcg (800 unit) tablet Take 1 tablet by mouth 2 times a day. 1/12/15   Historical Provider, MD   chlorhexidine (Peridex) 0.12 % solution RINSE MOUTH WITH 15ML (1 CAPFUL) FOR 30 SECONDS AM AND PM AFTER TOOTHBRUSHING. EXPECTORATE AFTER RINSING, DO NOT SWALLOW 7/13/23   Historical Provider, MD   dutasteride (Avodart) 0.5 mg capsule Avodart 0.5 MG Oral Capsule   Quantity: 30  Refills: 0        Start : 19-Nov-2014   Active 11/19/14   Historical Provider, MD   finasteride (Proscar) 5 mg tablet Take 1 tablet (5 mg) by mouth once daily.    Historical Provider, MD   fluocinonide (Lidex) 0.05 % ointment Fluocinonide 0.05 % External Ointment   Quantity: 60  Refills: 0        Start : 1-Oct-2014   Active 10/1/14   Historical Provider, MD   Hibiclens 4 % external liquid USE AD DIRECTED FOR PREOPERATIVE SHOWER 7/13/23   Historical Provider, MD   lisinopril 20 mg tablet Lisinopril 20 MG Oral Tablet   Quantity: 30  Refills: 0        Start : 9-Nov-2015   Active 11/9/15   Historical Provider, MD   lisinopril 40 mg tablet 0.5 tablets (20 mg) once daily. 6/27/17   Historical Provider, MD   losartan (Cozaar) 25 mg tablet Take 1 tablet (25 mg) by mouth once daily. 7/17/23   Historical Provider, MD   nystatin (Mycostatin) cream Nystatin 520637 UNIT/GM External Cream   Quantity: 120  Refills: 0        Start : 26-Sep-2016   Active 9/26/16   Historical Provider, MD   ProAir HFA 90 mcg/actuation inhaler ProAir  (90 Base) MCG/ACT Inhalation Aerosol Solution   Quantity: 85  Refills: 0        Start : 26-Sep-2016   Active 9/26/16   Historical Provider, MD   tamsulosin (Flomax) 0.4 mg 24 hr capsule Take 1 capsule (0.4 mg) by mouth 2 times a day. 30 minutes after the same meal each day.    Historical Provider, MD    umeclidinium-vilanteroL (Anoro Ellipta) 62.5-25 mcg/actuation blister with device INHALE 1 PUFF ONCE DAILY. 6/14/24   HARRISON Mari-STEFANO        PAT ROS     DASI Risk Score    No data to display       Caprini DVT Assessment    No data to display       Modified Frailty Index    No data to display       CHADS2 Stroke Risk  Current as of 3 minutes ago        N/A 3 to 100%: High Risk   2 to < 3%: Medium Risk   0 to < 2%: Low Risk     Last Change: N/A          This score determines the patient's risk of having a stroke if the patient has atrial fibrillation.        This score is not applicable to this patient. Components are not calculated.          Revised Cardiac Risk Index    No data to display       Apfel Simplified Score    No data to display       Risk Analysis Index Results This Encounter    No data found in the last 1 encounters.       Scheduled for repair inguinal hernia with Dr. Mccoy on 07/15/24.  PCP: Chris Munoz MD, [facility] P:[phone], F: [phone]    General Surgery: Shiva Mccoy MD [facility] 05/24/24 seen for bilateral inguinal hernias.  Per note: “Due to his lung cancer and poor PFTS he is a poor candidate for general anesthesia. We will attempt to do the hernia repair under local and MAC.”  -CT abdomen pelvis: 05/11/24  IMPRESSION:  1. There are bilateral inguinal hernias containing fat and  vasculature, but no loops of bowel.  2. There is a gallstone, but no evidence for cholecystitis or biliary  obstruction.    Rad/Onc: KARLEY Murphy L[facility] 04/18/24 seen for lung cancer.    Pulmonology:  KARLEY Mari L[facility] 12/18/23 seen for lung cancer. Bronchoscopy with biopsy showed adenocarcinoma (7LN biopsy negative for malignancy). SBRT completed from 8/2023-9/2023.  -XR Chest 2 views: 01/26/24  IMPRESSION:  1.  Redemonstration of solitary pulmonary nodule in the right upper  lobe, better characterized on CT of the chest from 12/14/2023.  overall decreased in size when  compared to prior chest radiograph  from 06/09/2023.  2. Bibasilar areas of linear atelectasis/scarring. Findings are  superimposed on chronic emphysematous changes of the lung. No  radiographic evidence of new acute cardiopulmonary process.      -PFT: 07/13/23  Interpretation  Spirometry demonstrates severe airway obstruction. Following the inhalation of a bronchodilator, there is no significant change in the airway mechanics.  The diffusing capacity for carbon monoxide, a reflection of alveolar-capillary gas transport, even corrected for alveolar volume is severely reduced.  -Transthoracic ECHO: 07/13/23  CONCLUSIONS:  Left ventricular systolic function is normal with a 60-65% estimated ejection fraction.    Hem/Onc: Thai Jordan MD L[facility] 09/27/23    Thoracic Surgery: Toyin Reynolds DO L[facility] 06/22/23    Nurse Plan of Action:   Requested PCP office note.   ONLY    Deana Hardy RN  Pre-Admission Testing

## 2024-06-28 ENCOUNTER — PRE-ADMISSION TESTING (OUTPATIENT)
Dept: PREADMISSION TESTING | Facility: HOSPITAL | Age: 79
End: 2024-06-28
Payer: MEDICARE

## 2024-06-28 ENCOUNTER — HOSPITAL ENCOUNTER (OUTPATIENT)
Dept: CARDIOLOGY | Facility: HOSPITAL | Age: 79
Discharge: HOME | End: 2024-06-28
Payer: MEDICARE

## 2024-06-28 VITALS
HEIGHT: 70 IN | HEART RATE: 57 BPM | WEIGHT: 180.3 LBS | TEMPERATURE: 98 F | DIASTOLIC BLOOD PRESSURE: 76 MMHG | SYSTOLIC BLOOD PRESSURE: 136 MMHG | OXYGEN SATURATION: 96 % | BODY MASS INDEX: 25.81 KG/M2

## 2024-06-28 DIAGNOSIS — H35.039 HYPERTENSIVE RETINOPATHY, GRADE 1, UNSPECIFIED LATERALITY: ICD-10-CM

## 2024-06-28 DIAGNOSIS — K40.90 INGUINAL HERNIA OF RIGHT SIDE WITHOUT OBSTRUCTION OR GANGRENE: ICD-10-CM

## 2024-06-28 DIAGNOSIS — R73.09 OTHER ABNORMAL GLUCOSE: ICD-10-CM

## 2024-06-28 DIAGNOSIS — Z01.818 PREOP EXAMINATION: ICD-10-CM

## 2024-06-28 DIAGNOSIS — Z01.818 PREOP EXAMINATION: Primary | ICD-10-CM

## 2024-06-28 LAB
ABO GROUP (TYPE) IN BLOOD: NORMAL
ANION GAP SERPL CALC-SCNC: 14 MMOL/L (ref 10–20)
ANTIBODY SCREEN: NORMAL
APPEARANCE UR: CLEAR
BILIRUB UR STRIP.AUTO-MCNC: NEGATIVE MG/DL
BUN SERPL-MCNC: 21 MG/DL (ref 6–23)
CALCIUM SERPL-MCNC: 9.3 MG/DL (ref 8.6–10.6)
CHLORIDE SERPL-SCNC: 104 MMOL/L (ref 98–107)
CO2 SERPL-SCNC: 24 MMOL/L (ref 21–32)
COLOR UR: NORMAL
CREAT SERPL-MCNC: 0.81 MG/DL (ref 0.5–1.3)
EGFRCR SERPLBLD CKD-EPI 2021: 89 ML/MIN/1.73M*2
ERYTHROCYTE [DISTWIDTH] IN BLOOD BY AUTOMATED COUNT: 14.8 % (ref 11.5–14.5)
EST. AVERAGE GLUCOSE BLD GHB EST-MCNC: 105 MG/DL
GLUCOSE SERPL-MCNC: 82 MG/DL (ref 74–99)
GLUCOSE UR STRIP.AUTO-MCNC: NORMAL MG/DL
HBA1C MFR BLD: 5.3 %
HCT VFR BLD AUTO: 38.3 % (ref 41–52)
HGB BLD-MCNC: 12.5 G/DL (ref 13.5–17.5)
KETONES UR STRIP.AUTO-MCNC: NEGATIVE MG/DL
LEUKOCYTE ESTERASE UR QL STRIP.AUTO: NEGATIVE
MCH RBC QN AUTO: 25.7 PG (ref 26–34)
MCHC RBC AUTO-ENTMCNC: 32.6 G/DL (ref 32–36)
MCV RBC AUTO: 79 FL (ref 80–100)
NITRITE UR QL STRIP.AUTO: NEGATIVE
NRBC BLD-RTO: 0 /100 WBCS (ref 0–0)
PH UR STRIP.AUTO: 5.5 [PH]
PLATELET # BLD AUTO: 157 X10*3/UL (ref 150–450)
POTASSIUM SERPL-SCNC: 3.9 MMOL/L (ref 3.5–5.3)
PROT UR STRIP.AUTO-MCNC: NEGATIVE MG/DL
RBC # BLD AUTO: 4.86 X10*6/UL (ref 4.5–5.9)
RBC # UR STRIP.AUTO: NEGATIVE /UL
RH FACTOR (ANTIGEN D): NORMAL
SODIUM SERPL-SCNC: 138 MMOL/L (ref 136–145)
SP GR UR STRIP.AUTO: 1.02
UROBILINOGEN UR STRIP.AUTO-MCNC: NORMAL MG/DL
WBC # BLD AUTO: 3.2 X10*3/UL (ref 4.4–11.3)

## 2024-06-28 PROCEDURE — 87081 CULTURE SCREEN ONLY: CPT

## 2024-06-28 PROCEDURE — 80048 BASIC METABOLIC PNL TOTAL CA: CPT

## 2024-06-28 PROCEDURE — 81003 URINALYSIS AUTO W/O SCOPE: CPT

## 2024-06-28 PROCEDURE — 93005 ELECTROCARDIOGRAM TRACING: CPT

## 2024-06-28 PROCEDURE — 36415 COLL VENOUS BLD VENIPUNCTURE: CPT

## 2024-06-28 PROCEDURE — 86901 BLOOD TYPING SEROLOGIC RH(D): CPT

## 2024-06-28 PROCEDURE — 83036 HEMOGLOBIN GLYCOSYLATED A1C: CPT

## 2024-06-28 PROCEDURE — 99204 OFFICE O/P NEW MOD 45 MIN: CPT | Performed by: NURSE PRACTITIONER

## 2024-06-28 PROCEDURE — 85027 COMPLETE CBC AUTOMATED: CPT

## 2024-06-28 RX ORDER — CHLORHEXIDINE GLUCONATE ORAL RINSE 1.2 MG/ML
15 SOLUTION DENTAL SEE ADMIN INSTRUCTIONS
Qty: 473 ML | Refills: 0 | Status: SHIPPED | OUTPATIENT
Start: 2024-06-28

## 2024-06-28 RX ORDER — CHLORHEXIDINE GLUCONATE 40 MG/ML
SOLUTION TOPICAL 2 TIMES DAILY
Qty: 473 ML | Refills: 0 | Status: SHIPPED | OUTPATIENT
Start: 2024-06-28 | End: 2024-07-03

## 2024-06-28 ASSESSMENT — DUKE ACTIVITY SCORE INDEX (DASI)
CAN YOU DO LIGHT WORK AROUND THE HOUSE LIKE DUSTING OR WASHING DISHES: YES
CAN YOU DO MODERATE WORK AROUND THE HOUSE LIKE VACUUMING, SWEEPING FLOORS OR CARRYING GROCERIES: NO
CAN YOU DO HEAVY WORK AROUND THE HOUSE LIKE SCRUBBING FLOORS OR LIFTING AND MOVING HEAVY FURNITURE: NO
CAN YOU WALK A BLOCK OR TWO ON LEVEL GROUND: NO
CAN YOU RUN A SHORT DISTANCE: NO
CAN YOU HAVE SEXUAL RELATIONS: NO
CAN YOU DO YARD WORK LIKE RAKING LEAVES, WEEDING OR PUSHING A MOWER: NO
TOTAL_SCORE: 7.2
CAN YOU PARTICIPATE IN MODERATE RECREATIONAL ACTIVITIES LIKE GOLF, BOWLING, DANCING, DOUBLES TENNIS OR THROWING A BASEBALL OR FOOTBALL: NO
CAN YOU TAKE CARE OF YOURSELF (EAT, DRESS, BATHE, OR USE TOILET): YES
CAN YOU PARTICIPATE IN STRENOUS SPORTS LIKE SWIMMING, SINGLES TENNIS, FOOTBALL, BASKETBALL, OR SKIING: NO
DASI METS SCORE: 3.6
CAN YOU WALK INDOORS, SUCH AS AROUND YOUR HOUSE: YES
CAN YOU CLIMB A FLIGHT OF STAIRS OR WALK UP A HILL: NO

## 2024-06-28 ASSESSMENT — LIFESTYLE VARIABLES: SMOKING_STATUS: NONSMOKER

## 2024-06-28 ASSESSMENT — ENCOUNTER SYMPTOMS
CHILLS: 0
SHORTNESS OF BREATH: 1
NECK NEGATIVE: 1
ENDOCRINE NEGATIVE: 1
EYES NEGATIVE: 1
CONSTITUTIONAL NEGATIVE: 1
MUSCULOSKELETAL NEGATIVE: 1
NEUROLOGICAL NEGATIVE: 1
CARDIOVASCULAR NEGATIVE: 1
FEVER: 0
GASTROINTESTINAL NEGATIVE: 1

## 2024-06-28 ASSESSMENT — PAIN - FUNCTIONAL ASSESSMENT: PAIN_FUNCTIONAL_ASSESSMENT: 0-10

## 2024-06-28 ASSESSMENT — PAIN SCALES - GENERAL: PAINLEVEL_OUTOF10: 0 - NO PAIN

## 2024-06-28 NOTE — CPM/PAT H&P
CPM/PAT Evaluation       Name: Nadine Smith (Nadine Smith)  /Age: 1945/79 y.o.     Visit Type:   In-Person       Chief Complaint: Repair Inguinal Hernia    HPI Patient is an 79 year old male, accompanied by significant other, scheduled for surgery with Dr. Shiva Mccoy on 7-15-24 related to Inguinal hernia of right side without obstruction or gangrene     Patient referred by Dr. Mccoy for preoperative evaluation of history of TIA, COPD, history of lung cancer, hypertension, hyperlipidemia, and BPH    Past Medical History:   Diagnosis Date    Bilateral inguinal hernia     R>L    BPH (benign prostatic hyperplasia)     Cataract     COPD (chronic obstructive pulmonary disease) (Multi)     PFTs with severe obstruction. Echo with normal EF.  Continue bevespi and albuterol    COVID-19 10/2021    Disorder of prostate, unspecified     Prostate troubles    Hypertension     NSCLC of right lung (Multi)     s/p SBRT that finished in 2023 (2023-2023)    Snoring     Snoring     Past Surgical History:   Procedure Laterality Date    BRONCHOSCOPY      Bronchoscopy with biopsy showed adenocarcinoma.    COLONOSCOPY      CT ANGIO NECK  2017    CT NECK ANGIO W AND WO IV CONTRAST 2017 [facility] [place] [facility]    CT HEAD ANGIO W AND WO IV CONTRAST  2017    CT HEAD ANGIO W AND WO IV CONTRAST 2017 [facility] [place] [facility]     Family History   Problem Relation Name Age of Onset    Cancer Father       No Known Allergies    Prior to Admission medications    Medication Sig Start Date End Date Taking? Authorizing Provider   albuterol 2.5 mg /3 mL (0.083 %) nebulizer solution TAKE 3 ML (2.5 MG) BY NEBULIZATION 4 TIMES A DAY AS NEEDED FOR WHEEZING OR SHORTNESS OF BREATH. 24  Maverick Friend, APRN-CNP   amLODIPine (Norvasc) 10 mg tablet Take 1 tablet (10 mg) by mouth once daily. 23   Historical Provider, MD   amLODIPine (Norvasc) 2.5 mg tablet amLODIPine Besylate 2.5 MG Oral  Tablet   Quantity: 30  Refills: 0        Start : 15-Dec-2014   Active 12/15/14   Historical Provider, MD   amLODIPine (Norvasc) 5 mg tablet Take 1 tablet (5 mg) by mouth once daily.    Historical Provider, MD   aspirin 81 mg EC tablet Take 1 tablet (81 mg) by mouth once daily. 5/3/17   Historical Provider, MD   atorvastatin (Lipitor) 10 mg tablet Atorvastatin Calcium 10 MG Oral Tablet   Quantity: 30  Refills: 0        Start : 15-Dec-2014   Active 12/15/14   Historical Provider, MD   Bevespi Aerosphere 9-4.8 mcg HFA aerosol inhaler INHALE 2 PUFFS 2 TIMES A DAY. 1/3/24   Gabbie Sutton, APRN-CNP   calcium carbonate-vitamin D3 600 mg-20 mcg (800 unit) tablet Take 1 tablet by mouth 2 times a day. 1/12/15   Historical Provider, MD   chlorhexidine (Peridex) 0.12 % solution RINSE MOUTH WITH 15ML (1 CAPFUL) FOR 30 SECONDS AM AND PM AFTER TOOTHBRUSHING. EXPECTORATE AFTER RINSING, DO NOT SWALLOW 7/13/23   Historical Provider, MD   dutasteride (Avodart) 0.5 mg capsule Avodart 0.5 MG Oral Capsule   Quantity: 30  Refills: 0        Start : 19-Nov-2014   Active 11/19/14   Historical Provider, MD   finasteride (Proscar) 5 mg tablet Take 1 tablet (5 mg) by mouth once daily.    Historical Provider, MD   fluocinonide (Lidex) 0.05 % ointment Fluocinonide 0.05 % External Ointment   Quantity: 60  Refills: 0        Start : 1-Oct-2014   Active 10/1/14   Historical Provider, MD   Hibiclens 4 % external liquid USE AD DIRECTED FOR PREOPERATIVE SHOWER 7/13/23   Historical Provider, MD   lisinopril 20 mg tablet Lisinopril 20 MG Oral Tablet   Quantity: 30  Refills: 0        Start : 9-Nov-2015   Active 11/9/15   Historical Provider, MD   lisinopril 40 mg tablet 0.5 tablets (20 mg) once daily. 6/27/17   Historical Provider, MD   losartan (Cozaar) 25 mg tablet Take 1 tablet (25 mg) by mouth once daily. 7/17/23   Historical Provider, MD   nystatin (Mycostatin) cream Nystatin 249933 UNIT/GM External Cream   Quantity: 120  Refills: 0        Start  : 26-Sep-2016   Active 9/26/16   Historical Provider, MD   ProAir HFA 90 mcg/actuation inhaler ProAir  (90 Base) MCG/ACT Inhalation Aerosol Solution   Quantity: 85  Refills: 0        Start : 26-Sep-2016   Active 9/26/16   Historical Provider, MD   tamsulosin (Flomax) 0.4 mg 24 hr capsule Take 1 capsule (0.4 mg) by mouth 2 times a day. 30 minutes after the same meal each day.    Historical Provider, MD   umeclidinium-vilanteroL (Anoro Ellipta) 62.5-25 mcg/actuation blister with device INHALE 1 PUFF ONCE DAILY. 6/14/24   Gabbie Sutton, APRN-CNP      PAT ROS:   Constitutional:   neg     no fever   no chills  Neuro/Psych:   neg    Eyes:   neg    Ears:    hearing loss   hearing aides  Nose:   neg    Mouth:   neg    Throat:   neg    Neck:   neg    Cardio:   neg     no chest pain   no palpitations  Respiratory:    Patient with COPD, Shortness of breath with activity   shortness of breath  Endocrine:   neg    GI:   neg    :    States symptoms controlled with tamsulosin and finasteride.   neg    Musculoskeletal:   neg    Hematologic:   neg     no history of blood transfusion   no blood clots  Skin:  neg      Physical Exam  Vitals reviewed.   Constitutional:       Appearance: Normal appearance. He is normal weight.   HENT:      Head: Normocephalic and atraumatic.      Nose: Nose normal.      Mouth/Throat:      Mouth: Mucous membranes are moist.      Pharynx: Oropharynx is clear.   Eyes:      Extraocular Movements: Extraocular movements intact.      Pupils: Pupils are equal, round, and reactive to light.   Cardiovascular:      Rate and Rhythm: Normal rate and regular rhythm.      Heart sounds: Normal heart sounds.   Pulmonary:      Effort: Pulmonary effort is normal. No tachypnea, bradypnea, accessory muscle usage, respiratory distress or retractions.      Breath sounds: No stridor. Wheezing present.      Comments: Scattered wheezes  Abdominal:      General: Abdomen is flat. Bowel sounds are normal.       "Palpations: Abdomen is soft.   Musculoskeletal:         General: Normal range of motion.      Cervical back: Normal range of motion and neck supple.   Skin:     General: Skin is warm and dry.   Neurological:      Mental Status: He is alert and oriented to person, place, and time.   Psychiatric:         Mood and Affect: Mood normal.         Behavior: Behavior normal.         Thought Content: Thought content normal.         Judgment: Judgment normal.        PAT AIRWAY:   Airway:     Mallampati::  III    Neck ROM::  Full   Upper and lower dentures  Facial hair   upper dentures and lower dentures    Visit Vitals  /76   Pulse 57   Temp 36.7 °C (98 °F) (Oral)     Visit Vitals  /76   Pulse 57   Temp 36.7 °C (98 °F) (Oral)   Ht 1.778 m (5' 10\")   Wt 81.8 kg (180 lb 4.8 oz)   SpO2 96%   BMI 25.87 kg/m²   Smoking Status Former   BSA 2.01 m²     DASI Risk Score      Flowsheet Row Most Recent Value   DASI SCORE 7.2   METS Score (Will be calculated only when all the questions are answered) 3.6          Caprini DVT Assessment      Flowsheet Row Most Recent Value   DVT Score 10   Current Status Major surgery planned, including arthroscopic and laproscopic (1-2 hours), Present cancer or chemotherapy, COPD   History COPD   Age Over 75 years   BMI 30 or less          Modified Frailty Index      Flowsheet Row Most Recent Value   Modified Frailty Index Calculator .2727          CHADS2 Stroke Risk  Current as of 50 minutes ago        N/A 3 to 100%: High Risk   2 to < 3%: Medium Risk   0 to < 2%: Low Risk     Last Change: N/A          This score determines the patient's risk of having a stroke if the patient has atrial fibrillation.        This score is not applicable to this patient. Components are not calculated.          Revised Cardiac Risk Index      Flowsheet Row Most Recent Value   Revised Cardiac Risk Calculator 1          Apfel Simplified Score      Flowsheet Row Most Recent Value   Apfel Simplified Score Calculator " 2          Risk Analysis Index Results This Encounter    No data found in the last 1 encounters.       Stop Bang Score      Flowsheet Row Most Recent Value   Do you snore loudly? 0   Do you often feel tired or fatigued after your sleep? 0   Has anyone ever observed you stop breathing in your sleep? 0   Do you have or are you being treated for high blood pressure? 0   Recent BMI (Calculated) 26.5   Is BMI greater than 35 kg/m2? 0=No   Age older than 50 years old? 1=Yes   Is your neck circumference greater than 17 inches (Male) or 16 inches (Female)? 0   Gender - Male 1=Yes   STOP-BANG Total Score 2          Assessment and Plan:     Anesthesia:     Per note from Dr. Mccoy on 5-24-24:  PLAN:  - Discussed with patient risks and benefits of inguinal hernia repair. Due to his lung cancer and poor PFTS he is a poor candidate for general anesthesia. We will attempt to do the hernia repair under local and MAC. Explained to patient that still puts him at risk and there is a chance he may still need a breathing tube with possibility of keeping the breathing tube after surgery.   - Will send patient to [facility] to be evaluated by anesthesia  - Discussed not operating on his left side due to being asymptomatic and trying to limit the total amount of anesthesia time, with risk that it may become symptomatic in the future.   - Patient understood and agreed to proceed with surgery  - Discussed with patient signs and symptoms of incarcerated/strangulated hernia and if there occur to go to the ED.     Neuro:   No neurologic diagnoses, however, the patient is at an increased risk for post operative delirium secondary to age >/= 65, type and duration of surgery    The patient is at an increased risk for perioperative stroke secondary to previous TIA, increased age, HTN, HLD, general anesthesia, op time >2.5 hours    Patient given information on brain exercises and delirium prevention.    History of TIA  Currently taking aspirin and  atorvastatin  Denies any recurrences or other issues   No notation of TIA outside of pulmonology note and scans from 2017.     7-17-23: MR Brain with and without contrast  There are no abnormal intracranial enhancing mass lesion to suggest  brain metastasis.      There is moderate brain parenchymal volume loss.      There are nonspecific white matter changes within the cerebral  hemispheres bilaterally as well as scattered foci of increased signal  on the FLAIR and T2 weighted images overlying the brainstem which  while nonspecific, given the patient's age, likely represent sequelae  of more remote small-vessel ischemic change.     5-24-17: CT angio neck  IMPRESSION:  1. No measurable stenosis of the cervical vessels. No evidence for measurable stenosis or large branch vessel cutoffs of the intracranial vessels.  2. No acute intracranial hemorrhage, infarct, or calvarial fracture.    5-18-17: CT angio head  IMPRESSION:  1. No measurable stenosis of the cervical vessels. No evidence for measurable stenosis or large branch vessel cutoffs of the intracranial vessels.  2. No acute intracranial hemorrhage, infarct, or calvarial fracture.    HEENT/Airway  No diagnosis or significant findings on chart review or clinical presentation and evaluation.    Cardiovascular    Hypertension & hyperlipidemia  Currently taking amlodipine, aspirin, and atorvastatin  BP today 136/76  No recent LDL noted    Denies any chest pain. Does have shortness of breath and wheezing due to pulmonary issues.     CARDS EVAL  The patient is not followed by cardiology.    RCRI  The patient meets 0-1 RCRI criteria and therefore has a less than 1% risk of major adverse cardiac complications.  METS  The patient's functional capacity is less than 4 METS. Patient with COPD and lung cancer. Short of breath with activity  MACE  30-day risk for MACE of 1 predictor, 6.0% risk for cardiac death, nonfatal myocardial infarction, and nonfatal cardiac  "arrest  JOE  0.2% risk for 51st to 90th percentile    6-28-24: EKG sinus bradycardia at 52    7-13-23: Echo  CONCLUSIONS:  1. Left ventricular systolic function is normal with a 60-65% estimated ejection fraction.    Pulmonary     COPD  Currently taking Anoro Ellipta and albuterol nebulizer    Also has Bevespi Aerosphere inhaler but is not currently using this one while he is on Anoro.     Is wheezing during exam, but is not in respiratory distress. O2 sats 96-98 on room air.     Will request recommendations/clearance from pulmonology for patient.     Per message received from HARRISON Mcclellan-CNP on 7-2-24:  \"As long as his breathing is about where he was ... in December he should be good. If he is using his inhalers as prescribed and feels his breathing is at his baseline he should be ok.\"     Following with HARRISON Mcclellan CNP, pulmonology. Per more recent note from 12-18-23:  1. Lung cancer: adenocarcinoma - 7LN biopsy negative for malignancy  - continue to follow with Hem/Onc and Rad/ Onc      2. COPD: PFTs with severe obstruction. Echo with normal EF.   - continue bevespi 2 puffs twice daily   - continue albuterol HFA 2 puffs or albuterol nebulizers every 4-6 hours as needed      --> Discussed talking with his PCP (possible [facility]) or financial assistance through [employer] - had concerns and questions regarding his medical bills.     History of lung cancer  Following with AJ Murphy CNP, radiation oncology. Per most recent note from 4-18-24:  79 year old male 7 months s/p SBRT to the RUL. Patient is doing well with no acute complaints related to treatment. Continue to use inhalers and nebulizer as prescribed. CT scan done today prior to this appt. Scan shows continued interval decrease in size of right upper lobe pulmonary mass as described above. Stable small pulmonary nodules as described above with no new pulmonary nodules appreciated. Moderate upper lung predominant emphysema. Patient " to return to clinic in  6 months with same day CT of the chest. Instructed to call with any questions or concerns.   STOP BANG Score of 2, which places patient at low risk for having GILBERT.  ARISCAT 3, low, 1.6% risk of in-hospital postoperative pulmonary complications  PRODIGY 27, high of respiratory depression episode.    Patient given information on deep breathing exercises as well as incentive spirometer     7-14-23: PFT available via PDF link    1-25-24: Chest Xray  IMPRESSION:  1.  Redemonstration of solitary pulmonary nodule in the right upper lobe, better characterized on CT of the chest from 12/14/2023. overall decreased in size when compared to prior chest radiograph from 06/09/2023.  2. Bibasilar areas of linear atelectasis/scarring. Findings are  superimposed on chronic emphysematous changes of the lung. No radiographic evidence of new acute cardiopulmonary process.    4-18-24: CT Chest  IMPRESSION:  1. Continued interval decrease in size of right upper lobe pulmonary mass as described above.  2. Stable small pulmonary nodules as described above with no new pulmonary nodules appreciated.  3. Moderate upper lung predominant emphysema.    Renal  No diagnosis or significant findings on chart review or clinical presentation and evaluation.    Endocrine  No diagnosis or significant findings on chart review or clinical presentation and evaluation.    Hematology  No diagnosis or significant findings on chart review or clinical presentation and evaluation.    Antiplatelet management   The patient is currently receiving antiplatelet therapy for primary prevention of cardiovascular disease.  Anticoagulation management  The patient is not currently receiving anticoagulation therapy.  Caprini score 10, high risk of VTE    Transfusion Evaluation  A type and screen was obtained given the likelihood for perioperative transfusion of blood or blood products.    Patient given information on DVT prevention.     GI  No  diagnosis or significant findings on chart review or clinical presentation and evaluation.    Eat 10- 0  Apfel: 2 points 39% risk for post operative nausea/vomiting.     Genitourinary    BPH  Symptoms more well controlled with finasteride and tamsulosin.     ID  No diagnosis or significant findings on chart review or clinical presentation and evaluation.    MRSA swab ordered  UA with reflex culture ordered  Chlorhexidine mouth wash and body wash ordered    Musculoskeletal    Inguinal hernia    Per note from Dr. Mccoy on 5-24-24:  PLAN:  - Discussed with patient risks and benefits of inguinal hernia repair. Due to his lung cancer and poor PFTS he is a poor candidate for general anesthesia. We will attempt to do the hernia repair under local and MAC. Explained to patient that still puts him at risk and there is a chance he may still need a breathing tube with possibility of keeping the breathing tube after surgery.   - Will send patient to [facility] to be evaluated by anesthesia  - Discussed not operating on his left side due to being asymptomatic and trying to limit the total amount of anesthesia time, with risk that it may become symptomatic in the future.   - Patient understood and agreed to proceed with surgery  - Discussed with patient signs and symptoms of incarcerated/strangulated hernia and if there occur to go to the ED.     5-11-24: CT abdomen pelvis  IMPRESSION:  1. There are bilateral inguinal hernias containing fat and  vasculature, but no loops of bowel.  2. There is a gallstone, but no evidence for cholecystitis or biliary obstruction.    -Preoperative medication instructions were provided and reviewed with the patient.  Any additional testing or evaluation was explained to the patient.  NPO Instructions were discussed, and the patient's questions were answered prior to conclusion of this encounter    Labs ordered:    Recent Results (from the past 168 hour(s))   CBC    Collection Time: 06/28/24 11:04 AM    Result Value Ref Range    WBC 3.2 (L) 4.4 - 11.3 x10*3/uL    nRBC 0.0 0.0 - 0.0 /100 WBCs    RBC 4.86 4.50 - 5.90 x10*6/uL    Hemoglobin 12.5 (L) 13.5 - 17.5 g/dL    Hematocrit 38.3 (L) 41.0 - 52.0 %    MCV 79 (L) 80 - 100 fL    MCH 25.7 (L) 26.0 - 34.0 pg    MCHC 32.6 32.0 - 36.0 g/dL    RDW 14.8 (H) 11.5 - 14.5 %    Platelets 157 150 - 450 x10*3/uL   Basic Metabolic Panel    Collection Time: 06/28/24 11:04 AM   Result Value Ref Range    Glucose 82 74 - 99 mg/dL    Sodium 138 136 - 145 mmol/L    Potassium 3.9 3.5 - 5.3 mmol/L    Chloride 104 98 - 107 mmol/L    Bicarbonate 24 21 - 32 mmol/L    Anion Gap 14 10 - 20 mmol/L    Urea Nitrogen 21 6 - 23 mg/dL    Creatinine 0.81 0.50 - 1.30 mg/dL    eGFR 89 >60 mL/min/1.73m*2    Calcium 9.3 8.6 - 10.6 mg/dL   Type And Screen    Collection Time: 06/28/24 11:04 AM   Result Value Ref Range    ABO TYPE O     Rh TYPE POS     ANTIBODY SCREEN NEG    Hemoglobin A1C    Collection Time: 06/28/24 11:04 AM   Result Value Ref Range    Hemoglobin A1C 5.3 see below %    Estimated Average Glucose 105 Not Established mg/dL   Staphylococcus aureus/MRSA colonization, Culture    Collection Time: 06/28/24 11:04 AM    Specimen: Nares/Axilla/Groin; Swab   Result Value Ref Range    Staph/MRSA Screen Culture No Staphylococcus aureus isolated    Urinalysis with Reflex Culture and Microscopic    Collection Time: 06/28/24 11:04 AM   Result Value Ref Range    Color, Urine Light-Yellow Light-Yellow, Yellow, Dark-Yellow    Appearance, Urine Clear Clear    Specific Gravity, Urine 1.021 1.005 - 1.035    pH, Urine 5.5 5.0, 5.5, 6.0, 6.5, 7.0, 7.5, 8.0    Protein, Urine NEGATIVE NEGATIVE, 10 (TRACE), 20 (TRACE) mg/dL    Glucose, Urine Normal Normal mg/dL    Blood, Urine NEGATIVE NEGATIVE    Ketones, Urine NEGATIVE NEGATIVE mg/dL    Bilirubin, Urine NEGATIVE NEGATIVE    Urobilinogen, Urine Normal Normal mg/dL    Nitrite, Urine NEGATIVE NEGATIVE    Leukocyte Esterase, Urine NEGATIVE NEGATIVE   Extra Urine  Cowan Tube    Collection Time: 06/28/24 11:04 AM   Result Value Ref Range    Extra Tube Hold for add-ons.    ECG 12 Lead    Collection Time: 06/28/24  7:29 PM   Result Value Ref Range    Ventricular Rate 52 BPM    Atrial Rate 52 BPM    ND Interval 200 ms    QRS Duration 82 ms    QT Interval 390 ms    QTC Calculation(Bazett) 362 ms    P Axis 75 degrees    R Axis 17 degrees    T Axis 9 degrees    QRS Count 8 beats    Q Onset 218 ms    P Onset 118 ms    P Offset 172 ms    T Offset 413 ms    QTC Fredericia 371 ms

## 2024-06-28 NOTE — PREPROCEDURE INSTRUCTIONS
Thank you for visiting [facility] ([facility]) today for your pre-procedure evaluation, you were seen by    Pauline Mota, MSN, NP-C, CNP  Family Nurse Practitioner  Department of Anesthesiology and Perioperative Medicine  Main phone: [phone]  Fax :[phone]    **Please be sure to follow any guidance provided by your surgeon regarding foods, fluids and medications prior to surgery**    This summary includes instructions and information to aid you during your perioperative period.  Please read carefully. If you have any questions about your visit today, please call the number listed above.  If you become ill or have any changes to your health before your surgery, please contact your primary care provider and alert your surgeon.    Preparing for your Surgery       Exercises  Preoperative Deep Breathing Exercises  Why it is important to do deep breathing exercises before my surgery?  Deep breathing exercises strengthen your breathing muscles.  This helps you to recover after your surgery and decreases the chance of breathing complications.  How are the deep breathing exercises done?  Sit straight with your back supported.  Breathe in deeply and slowly through your nose. Your lower rib cage should expand and your abdomen may move forward.  Hold that breath for 3 to 5 seconds.  Breathe out through pursed lips, slowly and completely.  Rest and repeat 10 times every hour while awake.  Rest longer if you become dizzy or lightheaded.    Preoperative Brain Exercises    What are brain exercises?  A brain exercise is any activity that engages your thinking (cognitive) skills.    What types of activities are considered brain exercises?  Jigsaw puzzles, crossword puzzles, word jumble, memory games, word search, and many more.  Many can be found free online or on your phone via a mobile delmar.    Why should I do brain exercises before my surgery?  More recent research has shown brain exercise before  surgery can lower the risk of postoperative delirium (confusion) which can be especially important for older adults.  Patients who did brain exercises for 5 to 10 hours the days before surgery, cut their risk of postoperative delirium in half up to 1 week after surgery.        Instructions    Preoperative Fasting Guidelines    Why must I stop eating and drinking near surgery time?  With sedation, food or liquid in your stomach can enter your lungs causing serious complications  Food can increase nausea and vomiting  When do I need to stop eating and drinking before my surgery?      Do not eat any food after midnight the night before your surgery/procedure. You may have up to 13.5 ounces of clear liquid until TWO hours before your instructed arrival time to the hospital.  This includes water, black tea/coffee, (no milk or cream) apple juice, and electrolyte drinks (Gatorade). You may chew gum until TWO hours before your surgery/procedure            Simple things you can do to help prevent blood clots     Blood clots are blockages that can form in the body's veins. When a blood clot forms in your deep veins, it may be called a deep vein thrombosis, or DVT for short. Blood clots can happen in any part of the body where blood flows, but they are most common in the arms and legs. If a piece of a blood clot breaks free and travels to the lungs, it is called a pulmonary embolus (PE). A PE can be a very serious problem.         Being in the hospital or having surgery can raise your chances of getting a blood clot because you may not be well enough to move around as much as you normally do.         Ways you can help prevent blood clots in the hospital       Wearing SCDs  SCDs stands for Sequential Compression Devices.   SCDs are special sleeves that wrap around your legs. They attach to a pump that fills them with air to gently squeeze your legs every few minutes.  This helps return the blood in your legs to your heart.    SCDs should only be taken off when walking or bathing. SCDs may not be comfortable, but they can help save your life.              Pump SCD leg sleeves  Wearing compression stockings - if your doctor orders them. These special snug-fitting stockings gently squeeze your legs to help blood flow.       Walking. Walking helps move the blood in your legs.   If your doctor says it is ok, try walking the halls at least   5 times a day. Ask us to help you get up, so you don't fall.      Taking any blood-thinning medicines your doctor orders.              Ways you can help prevent blood clots at home         Wearing compression stockings - if your doctor orders them.   Walking - to help move the blood in your legs.    Taking any blood-thinning medicines your doctor orders.      Signs of a blood clot or PE    Tell your doctor or nurse right away if you have any of the problems listed below.         If you are at home, seek medical care right away. Call 911 for chest pain or problems breathing.            Signs of a blood clot (DVT) - such as pain, swelling, redness, or warmth in your arm or legs.  Signs of a pulmonary embolism (PE) - such as chest pain or feeling short of breath      Tobacco and Alcohol;  Do not drink alcohol or smoke within 24 hours of surgery.  It is best to quit smoking for as long as possible before any surgery or procedure.    The Week before Surgery        Seven days before Surgery  Check your [facility] medication instructions  Do the exercises provided to you by CPM   Arrange for a responsible, adult licensed  to take you home after surgery and stay with you for 24 hours.  You will not be permitted to drive yourself home if you have received any anesthetic/sedation  Six days before surgery  Check your [facility] medication instructions  Do the exercises provided to you by [facility]   Start using Chlorhexidene (CHG) body wash if prescribed  Five days before surgery  Check your [facility] medication instructions  Do the  exercises provided to you by [facility]   Continue to use CHG body wash if prescribed  Three days before surgery  Check your [facility] medication instructions  Do the exercises provided to you by C[facility]   Continue to use CHG body wash if prescribed  Two days before surgery  Check your CPM medication instructions  Do the exercises provided to you by CPM   Continue to use CHG body wash if prescribed    The Day before Surgery       Check your CPM medication and all other CPM instructions including when to stop eating and drinking  You will be called with your arrival time for surgery in the late afternoon.  If you do not receive a call please reach out to your surgeon's office.  Do not smoke or drink 24 hours before surgery  Prepare items to bring with you to the hospital  Shower with your chlorhexidine wash if prescribed  Brush your teeth and use your chlorhexidine dental rinse if prescribed    The Day of Surgery       Check your [facility] medication instructions  Ensure you follow the instructions for when to stop eating and drinking  Shower, if prescribed use CHG.  Do not apply any lotions, creams, moisturizers, perfume or deodorant  Brush your teeth and use your CHG dental rinse if prescribed  Wear loose comfortable clothing  Avoid make-up  Remove  jewelry and piercings, consider professional piercing removal with a plastic spacer if needed  Bring photo ID and Insurance card  Bring an accurate medication list that includes medication dose, frequency and allergies  Bring a copy of your advanced directives (will, health care power of )  Bring any devices and controllers as well as medical devices you have been provided with for surgery (CPAP, slings, braces, etc.)  Dentures, eyeglasses, and contacts will be removed before surgery, please bring cases for contacts or glasses

## 2024-06-29 LAB
ATRIAL RATE: 52 BPM
HOLD SPECIMEN: NORMAL
P AXIS: 75 DEGREES
P OFFSET: 172 MS
P ONSET: 118 MS
PR INTERVAL: 200 MS
Q ONSET: 218 MS
QRS COUNT: 8 BEATS
QRS DURATION: 82 MS
QT INTERVAL: 390 MS
QTC CALCULATION(BAZETT): 362 MS
QTC FREDERICIA: 371 MS
R AXIS: 17 DEGREES
T AXIS: 9 DEGREES
T OFFSET: 413 MS
VENTRICULAR RATE: 52 BPM

## 2024-06-30 LAB — STAPHYLOCOCCUS SPEC CULT: NORMAL

## 2024-06-30 ASSESSMENT — ENCOUNTER SYMPTOMS: PALPITATIONS: 0

## 2024-07-14 ENCOUNTER — ANESTHESIA EVENT (OUTPATIENT)
Dept: OPERATING ROOM | Facility: HOSPITAL | Age: 79
End: 2024-07-14
Payer: MEDICARE

## 2024-07-15 ENCOUNTER — ANESTHESIA (OUTPATIENT)
Dept: OPERATING ROOM | Facility: HOSPITAL | Age: 79
End: 2024-07-15
Payer: MEDICARE

## 2024-07-15 ENCOUNTER — HOSPITAL ENCOUNTER (OUTPATIENT)
Facility: HOSPITAL | Age: 79
Setting detail: OUTPATIENT SURGERY
Discharge: HOME | End: 2024-07-15
Attending: STUDENT IN AN ORGANIZED HEALTH CARE EDUCATION/TRAINING PROGRAM | Admitting: STUDENT IN AN ORGANIZED HEALTH CARE EDUCATION/TRAINING PROGRAM
Payer: MEDICARE

## 2024-07-15 VITALS
DIASTOLIC BLOOD PRESSURE: 77 MMHG | SYSTOLIC BLOOD PRESSURE: 143 MMHG | RESPIRATION RATE: 16 BRPM | TEMPERATURE: 97.2 F | HEIGHT: 70 IN | OXYGEN SATURATION: 100 % | HEART RATE: 92 BPM | BODY MASS INDEX: 26.48 KG/M2 | WEIGHT: 184.97 LBS

## 2024-07-15 DIAGNOSIS — K40.90 INGUINAL HERNIA OF RIGHT SIDE WITHOUT OBSTRUCTION OR GANGRENE: Primary | ICD-10-CM

## 2024-07-15 PROBLEM — R94.31: Status: RESOLVED | Noted: 2023-09-25 | Resolved: 2024-07-15

## 2024-07-15 PROCEDURE — 3700000002 HC GENERAL ANESTHESIA TIME - EACH INCREMENTAL 1 MINUTE: Performed by: STUDENT IN AN ORGANIZED HEALTH CARE EDUCATION/TRAINING PROGRAM

## 2024-07-15 PROCEDURE — 96372 THER/PROPH/DIAG INJ SC/IM: CPT

## 2024-07-15 PROCEDURE — 2500000002 HC RX 250 W HCPCS SELF ADMINISTERED DRUGS (ALT 637 FOR MEDICARE OP, ALT 636 FOR OP/ED)

## 2024-07-15 PROCEDURE — 2500000005 HC RX 250 GENERAL PHARMACY W/O HCPCS: Performed by: STUDENT IN AN ORGANIZED HEALTH CARE EDUCATION/TRAINING PROGRAM

## 2024-07-15 PROCEDURE — 3600000008 HC OR TIME - EACH INCREMENTAL 1 MINUTE - PROCEDURE LEVEL THREE: Performed by: STUDENT IN AN ORGANIZED HEALTH CARE EDUCATION/TRAINING PROGRAM

## 2024-07-15 PROCEDURE — 2500000004 HC RX 250 GENERAL PHARMACY W/ HCPCS (ALT 636 FOR OP/ED)

## 2024-07-15 PROCEDURE — C1781 MESH (IMPLANTABLE): HCPCS | Performed by: STUDENT IN AN ORGANIZED HEALTH CARE EDUCATION/TRAINING PROGRAM

## 2024-07-15 PROCEDURE — 3700000001 HC GENERAL ANESTHESIA TIME - INITIAL BASE CHARGE: Performed by: STUDENT IN AN ORGANIZED HEALTH CARE EDUCATION/TRAINING PROGRAM

## 2024-07-15 PROCEDURE — 55060 REPAIR OF HYDROCELE: CPT | Performed by: STUDENT IN AN ORGANIZED HEALTH CARE EDUCATION/TRAINING PROGRAM

## 2024-07-15 PROCEDURE — 2720000007 HC OR 272 NO HCPCS: Performed by: STUDENT IN AN ORGANIZED HEALTH CARE EDUCATION/TRAINING PROGRAM

## 2024-07-15 PROCEDURE — 49505 PRP I/HERN INIT REDUC >5 YR: CPT | Performed by: STUDENT IN AN ORGANIZED HEALTH CARE EDUCATION/TRAINING PROGRAM

## 2024-07-15 PROCEDURE — 7100000010 HC PHASE TWO TIME - EACH INCREMENTAL 1 MINUTE: Performed by: STUDENT IN AN ORGANIZED HEALTH CARE EDUCATION/TRAINING PROGRAM

## 2024-07-15 PROCEDURE — 2780000003 HC OR 278 NO HCPCS: Performed by: STUDENT IN AN ORGANIZED HEALTH CARE EDUCATION/TRAINING PROGRAM

## 2024-07-15 PROCEDURE — 7100000009 HC PHASE TWO TIME - INITIAL BASE CHARGE: Performed by: STUDENT IN AN ORGANIZED HEALTH CARE EDUCATION/TRAINING PROGRAM

## 2024-07-15 PROCEDURE — 2500000004 HC RX 250 GENERAL PHARMACY W/ HCPCS (ALT 636 FOR OP/ED): Mod: JG | Performed by: STUDENT IN AN ORGANIZED HEALTH CARE EDUCATION/TRAINING PROGRAM

## 2024-07-15 PROCEDURE — 7100000001 HC RECOVERY ROOM TIME - INITIAL BASE CHARGE: Performed by: STUDENT IN AN ORGANIZED HEALTH CARE EDUCATION/TRAINING PROGRAM

## 2024-07-15 PROCEDURE — 2500000005 HC RX 250 GENERAL PHARMACY W/O HCPCS

## 2024-07-15 PROCEDURE — 7100000002 HC RECOVERY ROOM TIME - EACH INCREMENTAL 1 MINUTE: Performed by: STUDENT IN AN ORGANIZED HEALTH CARE EDUCATION/TRAINING PROGRAM

## 2024-07-15 PROCEDURE — 3600000003 HC OR TIME - INITIAL BASE CHARGE - PROCEDURE LEVEL THREE: Performed by: STUDENT IN AN ORGANIZED HEALTH CARE EDUCATION/TRAINING PROGRAM

## 2024-07-15 DEVICE — PATCH, MESH, MARLEX, 3 X 6 IN, POLYPROPYLENE: Type: IMPLANTABLE DEVICE | Site: ABDOMEN | Status: FUNCTIONAL

## 2024-07-15 RX ORDER — SODIUM CHLORIDE, SODIUM LACTATE, POTASSIUM CHLORIDE, CALCIUM CHLORIDE 600; 310; 30; 20 MG/100ML; MG/100ML; MG/100ML; MG/100ML
100 INJECTION, SOLUTION INTRAVENOUS CONTINUOUS
Status: DISCONTINUED | OUTPATIENT
Start: 2024-07-15 | End: 2024-07-15 | Stop reason: HOSPADM

## 2024-07-15 RX ORDER — HYDRALAZINE HYDROCHLORIDE 20 MG/ML
5 INJECTION INTRAMUSCULAR; INTRAVENOUS EVERY 4 HOURS PRN
Status: DISCONTINUED | OUTPATIENT
Start: 2024-07-15 | End: 2024-07-15 | Stop reason: HOSPADM

## 2024-07-15 RX ORDER — BUPIVACAINE HYDROCHLORIDE 5 MG/ML
INJECTION, SOLUTION PERINEURAL AS NEEDED
Status: DISCONTINUED | OUTPATIENT
Start: 2024-07-15 | End: 2024-07-15 | Stop reason: HOSPADM

## 2024-07-15 RX ORDER — OXYCODONE HYDROCHLORIDE 5 MG/1
5 TABLET ORAL EVERY 6 HOURS PRN
Qty: 10 TABLET | Refills: 0 | Status: SHIPPED | OUTPATIENT
Start: 2024-07-15

## 2024-07-15 RX ORDER — LIDOCAINE HYDROCHLORIDE 10 MG/ML
0.1 INJECTION, SOLUTION EPIDURAL; INFILTRATION; INTRACAUDAL; PERINEURAL ONCE
Status: DISCONTINUED | OUTPATIENT
Start: 2024-07-15 | End: 2024-07-15 | Stop reason: HOSPADM

## 2024-07-15 RX ORDER — ALBUTEROL SULFATE 0.83 MG/ML
2.5 SOLUTION RESPIRATORY (INHALATION) ONCE
Status: COMPLETED | OUTPATIENT
Start: 2024-07-15 | End: 2024-07-15

## 2024-07-15 RX ORDER — LIDOCAINE HYDROCHLORIDE 10 MG/ML
INJECTION INFILTRATION; PERINEURAL AS NEEDED
Status: DISCONTINUED | OUTPATIENT
Start: 2024-07-15 | End: 2024-07-15 | Stop reason: HOSPADM

## 2024-07-15 RX ORDER — ONDANSETRON HYDROCHLORIDE 2 MG/ML
INJECTION, SOLUTION INTRAVENOUS AS NEEDED
Status: DISCONTINUED | OUTPATIENT
Start: 2024-07-15 | End: 2024-07-15

## 2024-07-15 RX ORDER — DOCUSATE SODIUM 100 MG/1
100 CAPSULE, LIQUID FILLED ORAL 2 TIMES DAILY
Qty: 15 CAPSULE | Refills: 0 | Status: SHIPPED | OUTPATIENT
Start: 2024-07-15

## 2024-07-15 RX ORDER — DROPERIDOL 2.5 MG/ML
INJECTION, SOLUTION INTRAMUSCULAR; INTRAVENOUS AS NEEDED
Status: DISCONTINUED | OUTPATIENT
Start: 2024-07-15 | End: 2024-07-15

## 2024-07-15 RX ORDER — LIDOCAINE HYDROCHLORIDE 20 MG/ML
INJECTION, SOLUTION INFILTRATION; PERINEURAL AS NEEDED
Status: DISCONTINUED | OUTPATIENT
Start: 2024-07-15 | End: 2024-07-15

## 2024-07-15 RX ORDER — METHOCARBAMOL 100 MG/ML
1000 INJECTION, SOLUTION INTRAMUSCULAR; INTRAVENOUS ONCE
Status: DISCONTINUED | OUTPATIENT
Start: 2024-07-15 | End: 2024-07-15 | Stop reason: HOSPADM

## 2024-07-15 RX ORDER — ESMOLOL HYDROCHLORIDE 10 MG/ML
INJECTION INTRAVENOUS AS NEEDED
Status: DISCONTINUED | OUTPATIENT
Start: 2024-07-15 | End: 2024-07-15

## 2024-07-15 RX ORDER — MIDAZOLAM HYDROCHLORIDE 1 MG/ML
INJECTION INTRAMUSCULAR; INTRAVENOUS AS NEEDED
Status: DISCONTINUED | OUTPATIENT
Start: 2024-07-15 | End: 2024-07-15

## 2024-07-15 RX ORDER — HYDROMORPHONE HYDROCHLORIDE 1 MG/ML
0.5 INJECTION, SOLUTION INTRAMUSCULAR; INTRAVENOUS; SUBCUTANEOUS EVERY 5 MIN PRN
Status: DISCONTINUED | OUTPATIENT
Start: 2024-07-15 | End: 2024-07-15 | Stop reason: HOSPADM

## 2024-07-15 RX ORDER — OXYCODONE HYDROCHLORIDE 5 MG/1
5 TABLET ORAL EVERY 4 HOURS PRN
Status: DISCONTINUED | OUTPATIENT
Start: 2024-07-15 | End: 2024-07-15 | Stop reason: HOSPADM

## 2024-07-15 RX ORDER — HYDROMORPHONE HYDROCHLORIDE 1 MG/ML
0.2 INJECTION, SOLUTION INTRAMUSCULAR; INTRAVENOUS; SUBCUTANEOUS EVERY 5 MIN PRN
Status: DISCONTINUED | OUTPATIENT
Start: 2024-07-15 | End: 2024-07-15 | Stop reason: HOSPADM

## 2024-07-15 RX ORDER — ALBUTEROL SULFATE 0.83 MG/ML
2.5 SOLUTION RESPIRATORY (INHALATION) EVERY 6 HOURS PRN
Status: CANCELLED | OUTPATIENT
Start: 2024-07-15

## 2024-07-15 RX ORDER — FENTANYL CITRATE 50 UG/ML
INJECTION, SOLUTION INTRAMUSCULAR; INTRAVENOUS AS NEEDED
Status: DISCONTINUED | OUTPATIENT
Start: 2024-07-15 | End: 2024-07-15

## 2024-07-15 RX ORDER — PROPOFOL 10 MG/ML
INJECTION, EMULSION INTRAVENOUS AS NEEDED
Status: DISCONTINUED | OUTPATIENT
Start: 2024-07-15 | End: 2024-07-15

## 2024-07-15 RX ORDER — SODIUM CHLORIDE 0.9 G/100ML
IRRIGANT IRRIGATION AS NEEDED
Status: DISCONTINUED | OUTPATIENT
Start: 2024-07-15 | End: 2024-07-15 | Stop reason: HOSPADM

## 2024-07-15 RX ORDER — OXYCODONE HYDROCHLORIDE 5 MG/1
10 TABLET ORAL EVERY 4 HOURS PRN
Status: DISCONTINUED | OUTPATIENT
Start: 2024-07-15 | End: 2024-07-15 | Stop reason: HOSPADM

## 2024-07-15 RX ORDER — ALBUTEROL SULFATE 0.83 MG/ML
2.5 SOLUTION RESPIRATORY (INHALATION) EVERY 6 HOURS PRN
Status: DISCONTINUED | OUTPATIENT
Start: 2024-07-15 | End: 2024-07-15 | Stop reason: HOSPADM

## 2024-07-15 RX ORDER — WATER 1 ML/ML
IRRIGANT IRRIGATION AS NEEDED
Status: DISCONTINUED | OUTPATIENT
Start: 2024-07-15 | End: 2024-07-15 | Stop reason: HOSPADM

## 2024-07-15 RX ORDER — ROCURONIUM BROMIDE 10 MG/ML
INJECTION, SOLUTION INTRAVENOUS AS NEEDED
Status: DISCONTINUED | OUTPATIENT
Start: 2024-07-15 | End: 2024-07-15

## 2024-07-15 RX ORDER — CEFAZOLIN 1 G/1
INJECTION, POWDER, FOR SOLUTION INTRAVENOUS AS NEEDED
Status: DISCONTINUED | OUTPATIENT
Start: 2024-07-15 | End: 2024-07-15

## 2024-07-15 RX ORDER — ONDANSETRON HYDROCHLORIDE 2 MG/ML
4 INJECTION, SOLUTION INTRAVENOUS ONCE AS NEEDED
Status: DISCONTINUED | OUTPATIENT
Start: 2024-07-15 | End: 2024-07-15 | Stop reason: HOSPADM

## 2024-07-15 RX ORDER — HYDRALAZINE HYDROCHLORIDE 20 MG/ML
INJECTION INTRAMUSCULAR; INTRAVENOUS AS NEEDED
Status: DISCONTINUED | OUTPATIENT
Start: 2024-07-15 | End: 2024-07-15

## 2024-07-15 RX ORDER — LABETALOL HYDROCHLORIDE 5 MG/ML
INJECTION, SOLUTION INTRAVENOUS AS NEEDED
Status: DISCONTINUED | OUTPATIENT
Start: 2024-07-15 | End: 2024-07-15

## 2024-07-15 RX ORDER — KETOROLAC TROMETHAMINE 30 MG/ML
INJECTION, SOLUTION INTRAMUSCULAR; INTRAVENOUS AS NEEDED
Status: DISCONTINUED | OUTPATIENT
Start: 2024-07-15 | End: 2024-07-15

## 2024-07-15 SDOH — HEALTH STABILITY: MENTAL HEALTH: CURRENT SMOKER: 0

## 2024-07-15 ASSESSMENT — PAIN SCALES - GENERAL
PAINLEVEL_OUTOF10: 0 - NO PAIN

## 2024-07-15 ASSESSMENT — COLUMBIA-SUICIDE SEVERITY RATING SCALE - C-SSRS
6. HAVE YOU EVER DONE ANYTHING, STARTED TO DO ANYTHING, OR PREPARED TO DO ANYTHING TO END YOUR LIFE?: NO
2. HAVE YOU ACTUALLY HAD ANY THOUGHTS OF KILLING YOURSELF?: NO
1. IN THE PAST MONTH, HAVE YOU WISHED YOU WERE DEAD OR WISHED YOU COULD GO TO SLEEP AND NOT WAKE UP?: NO

## 2024-07-15 ASSESSMENT — PAIN - FUNCTIONAL ASSESSMENT
PAIN_FUNCTIONAL_ASSESSMENT: 0-10

## 2024-07-15 NOTE — DISCHARGE INSTRUCTIONS
You have surgical glue on your incision. Please do not pick at the glue, it will fall off on its own.    You may resume a normal diet after leaving the hospital.     Please avoid lifting >20lbs for the next 2 weeks.     You will have a follow up appointment with Dr. Mccoy in 2 week. Please call to schedule.

## 2024-07-15 NOTE — ANESTHESIA PREPROCEDURE EVALUATION
Patient: Nadine Smith    Procedure Information       Date/Time: 07/15/24 0715    Procedure: Repair Inguinal Hernia    Location: [facility] OR 01 / Virtual [facility] OR    Surgeons: Shiva Mccoy MD            Relevant Problems   Cardiac   (+) ST segment changes on electrocardiography (Resolved)      Pulmonary   (+) Chronic obstructive pulmonary disease (Multi)   (+) Non-small cell lung cancer (Multi)      HEENT   (+) Asymmetrical sensorineural hearing loss       Clinical information reviewed:   Tobacco  Allergies  Meds   Med Hx  Surg Hx   Fam Hx  Soc Hx        NPO Detail:  NPO/Void Status  Carbohydrate Drink Given Prior to Surgery? : N  Date of Last Liquid: 07/14/24  Time of Last Liquid: 1830  Date of Last Solid: 07/14/24  Time of Last Solid: 1830  Last Intake Type: Light meal  Time of Last Void: 0618         Physical Exam    Airway  Mallampati: III  TM distance: >3 FB  Neck ROM: full     Cardiovascular - normal exam     Dental   (+) upper dentures, lower dentures     Pulmonary - normal exam     Abdominal - normal exam             Anesthesia Plan    History of general anesthesia?: no  History of complications of general anesthesia?: unknown/emergency    ASA 3     general     The patient is not a current smoker.    intravenous induction   Postoperative administration of opioids is intended.  Trial extubation is planned.  Anesthetic plan and risks discussed with patient.  Use of blood products discussed with patient who consented to blood products.    Plan discussed with resident.

## 2024-07-15 NOTE — ANESTHESIA PROCEDURE NOTES
Airway  Date/Time: 7/15/2024 7:39 AM  Urgency: elective    Airway not difficult    Staffing  Performed: resident   Authorized by: Taqueria Costa MD    Performed by: Dwight Cole MD  Patient location during procedure: OR    Indications and Patient Condition  Indications for airway management: anesthesia  Spontaneous Ventilation: absent  Sedation level: deep  Preoxygenated: yes  Patient position: sniffing  Mask difficulty assessment: 1 - vent by mask  Planned trial extubation    Final Airway Details  Final airway type: endotracheal airway      Successful airway: ETT  Cuffed: yes   Successful intubation technique: direct laryngoscopy  Facilitating devices/methods: intubating stylet  Endotracheal tube insertion site: oral  Blade: Pradeep  Blade size: #4  ETT size (mm): 7.5  Cormack-Lehane Classification: grade I - full view of glottis  Placement verified by: chest auscultation and capnometry   Measured from: lips  ETT to lips (cm): 22  Number of attempts at approach: 1    Additional Comments  Patient initially bag mask ventilated after induction without difficulty or requirement for oral airway. LMA initially placed at 07:33 after induction with no complications or difficulty. Patient was ventilating appropriately on LMA. Decision made to remove LMA and place endotracheal tube due to patient being edentulous and concerns for ventilation. LMA removed and patient was successfully bag mask ventilated. Rocuronium was given and patient was given additional propofol for intubation. Intubation performed with grade 1 view with 1 single attempt.

## 2024-07-15 NOTE — OP NOTE
Repair Inguinal Hernia (R) Operative Note     Date: 7/15/2024  OR Location: [facility] OR    Name: Nadine Smith, : 1945, Age: 79 y.o., MRN: [id number], Sex: male    Diagnosis  Pre-op Diagnosis      * Inguinal hernia of right side without obstruction or gangrene [K40.90] Post-op Diagnosis     * Inguinal hernia of right side without obstruction or gangrene [K40.90]     Procedures  Open right inguinal hernia repair  Repair of hydrocele    Surgeons      * Shiva Mccoy - Primary    Resident/Fellow/Other Assistant:  Surgeons and Role:     * Willem Carroll MD - Resident - Assisting    Procedure Summary  Anesthesia: General  ASA: III  Anesthesia Staff: Anesthesiologist: Taqueria Costa MD  Anesthesia Resident: Dwight Cole MD  Estimated Blood Loss: 15mL  Intra-op Medications:   Administrations occurring from 0715 to 0930 on 07/15/24:   Medication Name Total Dose   sodium chloride 0.9 % irrigation solution 100 mL   BUPivacaine HCl (Marcaine) 0.5 % (5 mg/mL) injection 34 mL   lidocaine (Xylocaine) 10 mg/mL (1 %) injection 34 mL   sterile water irrigation solution 1,000 mL   albuterol 2.5 mg /3 mL (0.083 %) nebulizer solution 2.5 mg 2.5 mg              Anesthesia Record               Intraprocedure I/O Totals       None           Specimen: No specimens collected     Staff:   Circulator: Aramis Rowell Person: Russell Bonilla Circulator: Oliva         Drains and/or Catheters: * None in log *    Tourniquet Times:         Implants:  Implants       Type Name Action Serial No.      Surgical Mesh Sling Implant PATCH, MESH, MARLEX, 3 X 6 IN, POLYPROPYLENE - [id number] - [id number] Implanted [id number]              Findings: moderate sized indirect inguinal hernia    Indications: Nadine Smith is an 79 y.o. male who is having surgery for Inguinal hernia of right side without obstruction or gangrene [K40.90].     The patient was seen in the preoperative area. The risks, benefits, complications, treatment  options, non-operative alternatives, expected recovery and outcomes were discussed with the patient. The possibilities of reaction to medication, pulmonary aspiration, injury to surrounding structures, bleeding, recurrent infection, the need for additional procedures, failure to diagnose a condition, and creating a complication requiring transfusion or operation were discussed with the patient. The patient concurred with the proposed plan, giving informed consent.  The site of surgery was properly noted/marked if necessary per policy. The patient has been actively warmed in preoperative area. Preoperative antibiotics have been ordered and given within 1 hours of incision. Venous thrombosis prophylaxis have been ordered including bilateral sequential compression devices    Procedure Details:   BRIEF HISTORY: This is a 79 y.o. year old male who initially presented with signs and symptoms consistent with a right inguinal hernia. History, physical, labs, and imaging were all consistent with a right inguinal hernia. The decision was therefore made to take to the patient to the operating room for a right inguinal hernia repair. The risks, benefits, alternatives, and complications of the procedure were explained to the patient and he signed informed consent to proceed.    PREOPERATIVE DIAGNOSIS: right inguinal hernia    POSTOPERATIVE DIAGNOSIS: same    PROCEDURE PERFORMED:  Open right inguinal hernia repair  Right hydrocele repair    SURGEON: Dr. Shiva Mccoy    ANESTHESIA: VERNON     ESTIMATED BLOOD LOSS: 1ml    SPECIMEN: None    COMPLICATIONS: None.    BRIEF HISTORY: This is a 79 y.o. year old male who initially presented with signs and symptoms consistent with a right inguinal hernia. History, physical, labs, and imaging were all consistent with a right inguinal hernia. The decision was therefore made to take to the patient to the operating room for a right inguinal hernia repair. The risks, benefits, alternatives, and  complications of the procedure were explained to the patient and he signed informed consent to proceed.    OPERATIVE NOTE: Preoperative antibiotics were given and documented. After a surgical timeout, the patient was prepped and draped in the usual sterile fashion with chlorhexidine.  An incision was made in the right groin overlying the inguinal ligament.  The subcutaneous tissues were divided with electrocautery and the external oblique aponeurosis was encountered.  Local anesthesia was injected underneath the aponeurosis of the external oblique to protect the underlying structures and an incision was made in the external oblique aponeurosis with a scalpel.  Scissors were then used to fully open the external oblique aponeurosis.  There was an obvious indirect inguinal hernia.  This was fully encircled with a Penrose to assist with retraction.  The entire hernia was then reduced into the operative field.  It then became obvious that in addition to the peritoneal hernia sac there was also a small hydrocele component.  The hydrocele was then opened and  fully from the hernia sac. After the hydrocele was fully opened and inverted, it was reduced back into the scrotum.  The hernia sac was then identified and  from the inguinal vessels and vas deferens which was identified and protected throughout the dissection.  The hernia sac was fully dissected from the cord structures to the level of the deep inguinal ring.  Decision was made to suture ligate the hernia sac which was done with a 2-0 silk suture.  The redundant hernia sac was then resected.  The ligated hernia sac was then reintroduced into the abdomen.  A 6 x 3 inch piece of macro porous polypropylene mesh was then selected and cut to shape including a keyhole.  The mesh was sutured using 2-0 Prolene sutures inferiorly to the shelving edge of the inguinal ligament and superiorly to the conjoined tendon starting at the pubic tubercle.  The cord  structures were placed through the keyhole and the tails of the mesh were sutured together using a 2-0 Prolene suture.  The tails of the mesh were then placed underneath the external oblique aponeurosis laterally.  Care was taken to ensure to not impinge on the cord structures during the reconstruction of the deep inguinal ring.  The mesh laid well and attention was turned to closing the external oblique aponeurosis which was done with a running 2-0 Vicryl suture.  Subcutaneous tissues were reapproximated with 3-0 Vicryl suture, local anesthetic was injected, and the skin was closed with a running 4-0 Monocryl suture.  Surgical skin glue was applied as a dressing. The patient tolerated the procedure well, there were no immediate complications, and the patient was taken to PACU in stable condition.      Complications:  None; patient tolerated the procedure well.    Disposition: PACU - hemodynamically stable.  Condition: stable         Additional Details:       Attending Attestation: I was present and scrubbed for the entire procedure.    Shiva Mccoy  Phone Number: [phone]

## 2024-07-15 NOTE — ANESTHESIA POSTPROCEDURE EVALUATION
Patient: Nadine Smith    Procedure Summary       Date: 07/15/24 Room / Location: [facility] OR 01 / Virtual [facility] OR    Anesthesia Start: 0723 Anesthesia Stop: 0932    Procedure: Repair Inguinal Hernia (Right) Diagnosis:       Inguinal hernia of right side without obstruction or gangrene      (Inguinal hernia of right side without obstruction or gangrene [K40.90])    Surgeons: Shiva Mccoy MD Responsible Provider: Taqueria Costa MD    Anesthesia Type: general ASA Status: 3            Anesthesia Type: general    Vitals Value Taken Time   /116 07/15/24 0924   Temp 36 °C (96.8 °F) 07/15/24 0924   Pulse 75 07/15/24 0925   Resp 20 07/15/24 0925   SpO2 97 % 07/15/24 0925   Vitals shown include unfiled device data.    Anesthesia Post Evaluation    Patient location during evaluation: PACU  Patient participation: complete - patient participated  Level of consciousness: awake  Pain management: adequate  Airway patency: patent  Cardiovascular status: acceptable  Respiratory status: acceptable  Hydration status: acceptable  Postoperative Nausea and Vomiting: none        No notable events documented.

## 2024-07-30 ENCOUNTER — APPOINTMENT (OUTPATIENT)
Dept: SURGERY | Facility: CLINIC | Age: 79
End: 2024-07-30
Payer: MEDICARE

## 2024-07-30 VITALS
OXYGEN SATURATION: 96 % | WEIGHT: 185 LBS | TEMPERATURE: 98.4 F | SYSTOLIC BLOOD PRESSURE: 173 MMHG | BODY MASS INDEX: 26.54 KG/M2 | HEART RATE: 58 BPM | DIASTOLIC BLOOD PRESSURE: 84 MMHG

## 2024-07-30 DIAGNOSIS — K40.20 NON-RECURRENT BILATERAL INGUINAL HERNIA WITHOUT OBSTRUCTION OR GANGRENE: Primary | ICD-10-CM

## 2024-07-30 PROCEDURE — 1126F AMNT PAIN NOTED NONE PRSNT: CPT | Performed by: STUDENT IN AN ORGANIZED HEALTH CARE EDUCATION/TRAINING PROGRAM

## 2024-07-30 PROCEDURE — 99211 OFF/OP EST MAY X REQ PHY/QHP: CPT | Performed by: STUDENT IN AN ORGANIZED HEALTH CARE EDUCATION/TRAINING PROGRAM

## 2024-07-30 PROCEDURE — 1036F TOBACCO NON-USER: CPT | Performed by: STUDENT IN AN ORGANIZED HEALTH CARE EDUCATION/TRAINING PROGRAM

## 2024-07-30 PROCEDURE — 1159F MED LIST DOCD IN RCRD: CPT | Performed by: STUDENT IN AN ORGANIZED HEALTH CARE EDUCATION/TRAINING PROGRAM

## 2024-07-30 ASSESSMENT — ENCOUNTER SYMPTOMS: DEPRESSION: 0

## 2024-07-30 ASSESSMENT — PAIN SCALES - GENERAL: PAINLEVEL: 0-NO PAIN

## 2024-07-30 NOTE — PROGRESS NOTES
History Of Present Illness  Patient is a 79 y.o. male who presents for a postoperative follow-up following a open right inguinal hernia repair and repair of a right hydrocele.  He recovered well in the postoperative period and was subsequently discharged home on the same day.  His pain is greatly improved since the surgery.  Denies any significant ecchymosis or swelling in the area.  Currently denies any fevers, chills, nausea, vomiting, chest pain, shortness of breath.  He is ambulating well and tolerating his diet.     Past Medical History  Past Medical History:   Diagnosis Date    Bilateral inguinal hernia     R>L    BPH (benign prostatic hyperplasia)     Cataract     COPD (chronic obstructive pulmonary disease) (Multi)     PFTs with severe obstruction. Echo with normal EF.  Continue bevespi and albuterol    COVID-19 10/2021    Disorder of prostate, unspecified     Prostate troubles    Hypertension     NSCLC of right lung (Multi)     s/p SBRT that finished in Sept 2023 (08/2023-09/2023)    Snoring     Snoring       Surgical History  Past Surgical History:   Procedure Laterality Date    BRONCHOSCOPY      Bronchoscopy with biopsy showed adenocarcinoma.    COLONOSCOPY      CT ANGIO NECK  05/18/2017    CT NECK ANGIO W AND WO IV CONTRAST 5/18/2017 [facility] [place] [facility]    CT HEAD ANGIO W AND WO IV CONTRAST  05/18/2017    CT HEAD ANGIO W AND WO IV CONTRAST 5/18/2017 [facility] [place] [facility]        Social History  He reports that he has quit smoking. His smoking use included cigarettes. He has never used smokeless tobacco. He reports that he does not currently use alcohol. He reports that he does not use drugs.    Family History  Family History   Problem Relation Name Age of Onset    Cancer Father          Allergies  Patient has no known allergies.    Review of Systems  - CONSTITUTIONAL: Denies fever and chills.  - HEENT: Denies changes in vision and hearing.  - RESPIRATORY: Denies SOB and cough.  - CV: Denies palpitations  and CP.  - GI: Denies abdominal pain, nausea, vomiting and diarrhea.  - : Denies dysuria and urinary frequency.  - MSK: Denies myalgia and joint pain.  - SKIN: Denies rash and pruritus.  - NEUROLOGICAL: Denies headache and syncope.  - PSYCHIATRIC: Denies recent changes in mood. Denies anxiety and depression.     Physical Exam  - GENERAL: Alert and oriented x 3. No acute distress. Well-nourished.  - EYES: EOMI. Anicteric.  - HENT: Moist mucous membranes. No scleral icterus. No cervical lymphadenopathy.  - LUNGS: Breathing comfortably on room air. No accessory muscle use, no distress.  - CARDIOVASCULAR: Regular rate and rhythm. No murmur. No JVD.  - ABDOMEN: Soft, non-tender and non-distended. No palpable masses.  Right groin incision clean dry and intact, no surrounding erythema, induration, palpable fluid collection.  No evidence of hernia recurrence.  - EXTREMITIES: No edema. Non-tender.  - SKIN: No rashes or lesions. Warm.  - NEUROLOGIC: No focal neurological deficits. CN II-XII grossly intact, but not individually tested.  - PSYCHIATRIC: Cooperative. Appropriate mood and affect.    Last Recorded Vitals  Blood pressure 173/84, pulse 58, temperature 36.9 °C (98.4 °F), weight 83.9 kg (185 lb), SpO2 96%.    Relevant Results  No results found.     Assessment and Plan  Patient is a 79 y.o. male who presents for a postoperative visit following a open right inguinal hernia repair and repair of a right hydrocele.  He has recovered well without any unexpected complications at this time.  He can resume his normal activity.  I did recommend no baths or swimming for another 2 weeks but showers are acceptable.  His questions were answered and at this time he can follow-up as needed.    Shiva Mccoy MD

## 2024-08-02 ENCOUNTER — APPOINTMENT (OUTPATIENT)
Dept: SURGERY | Facility: CLINIC | Age: 79
End: 2024-08-02
Payer: MEDICARE

## 2024-08-15 DIAGNOSIS — J43.9 PULMONARY EMPHYSEMA, UNSPECIFIED EMPHYSEMA TYPE (MULTI): ICD-10-CM

## 2024-08-19 RX ORDER — ALBUTEROL SULFATE 0.83 MG/ML
2.5 SOLUTION RESPIRATORY (INHALATION) 4 TIMES DAILY PRN
Qty: 75 ML | Refills: 3 | Status: SHIPPED | OUTPATIENT
Start: 2024-08-19 | End: 2025-08-19

## 2024-08-29 ENCOUNTER — APPOINTMENT (OUTPATIENT)
Dept: OPHTHALMOLOGY | Facility: CLINIC | Age: 79
End: 2024-08-29
Payer: MEDICARE

## 2024-08-29 DIAGNOSIS — H52.03 HYPEROPIA, BILATERAL: ICD-10-CM

## 2024-08-29 DIAGNOSIS — H52.223 REGULAR ASTIGMATISM, BILATERAL: ICD-10-CM

## 2024-08-29 DIAGNOSIS — H25.813 COMBINED FORMS OF AGE-RELATED CATARACT, BILATERAL: Primary | ICD-10-CM

## 2024-08-29 DIAGNOSIS — H52.4 PRESBYOPIA: ICD-10-CM

## 2024-08-29 PROCEDURE — 92004 COMPRE OPH EXAM NEW PT 1/>: CPT | Performed by: OPHTHALMOLOGY

## 2024-08-29 ASSESSMENT — CONF VISUAL FIELD
OD_NORMAL: 1
OD_SUPERIOR_NASAL_RESTRICTION: 0
OS_NORMAL: 1
OD_INFERIOR_NASAL_RESTRICTION: 0
OS_SUPERIOR_TEMPORAL_RESTRICTION: 0
OD_SUPERIOR_TEMPORAL_RESTRICTION: 0
OS_SUPERIOR_NASAL_RESTRICTION: 0
OS_INFERIOR_TEMPORAL_RESTRICTION: 0
OS_INFERIOR_NASAL_RESTRICTION: 0
OD_INFERIOR_TEMPORAL_RESTRICTION: 0

## 2024-08-29 ASSESSMENT — REFRACTION_MANIFEST
METHOD_AUTOREFRACTION: 1
OD_SPHERE: +1.50
OD_ADD: +2.50
OS_AXIS: 105
OS_AXIS: 097
OS_SPHERE: +1.50
OD_AXIS: 100
OS_CYLINDER: -1.25
OS_CYLINDER: -1.25
OS_SPHERE: +1.75
OD_SPHERE: +1.25
OS_ADD: +2.50
OD_CYLINDER: -0.75
OD_CYLINDER: -0.75
OD_AXIS: 100

## 2024-08-29 ASSESSMENT — REFRACTION_WEARINGRX
OD_CYLINDER: -1.00
OD_SPHERE: +1.25
SPECS_TYPE: BIFOCAL
OS_AXIS: 090
OS_SPHERE: +1.50
OD_AXIS: 090
OS_CYLINDER: -1.00
OS_ADD: +2.25
OD_ADD: +2.25

## 2024-08-29 ASSESSMENT — SLIT LAMP EXAM - LIDS
COMMENTS: GOOD POSITION, MGD
COMMENTS: GOOD POSITION, MGD

## 2024-08-29 ASSESSMENT — EXTERNAL EXAM - LEFT EYE: OS_EXAM: NORMAL

## 2024-08-29 ASSESSMENT — VISUAL ACUITY
OS_CC: 20/30
METHOD: SNELLEN - LINEAR
CORRECTION_TYPE: GLASSES
OD_CC: 20/25
OS_CC+: -2

## 2024-08-29 ASSESSMENT — TONOMETRY
OS_IOP_MMHG: 14
IOP_METHOD: GOLDMANN APPLANATION
OD_IOP_MMHG: 13

## 2024-08-29 ASSESSMENT — CUP TO DISC RATIO
OS_RATIO: .3
OD_RATIO: .3

## 2024-08-29 ASSESSMENT — EXTERNAL EXAM - RIGHT EYE: OD_EXAM: NORMAL

## 2024-08-29 NOTE — PROGRESS NOTES
Assessment/Plan   Diagnoses and all orders for this visit:  Combined forms of age-related cataract, bilateral  Cataract not visually significant at this time. Discussed cataract surgery indications,20/50 or worse, glare dropping vision 2 lines or more and affecting activities of daily living  Observe for progression   Hyperopia, bilateral  Regular astigmatism, bilateral  Presbyopia  Vision good in PG  Glasses prescription given to patient today

## 2024-10-03 ENCOUNTER — APPOINTMENT (OUTPATIENT)
Dept: VASCULAR MEDICINE | Facility: HOSPITAL | Age: 79
End: 2024-10-03
Payer: MEDICARE

## 2024-10-23 ENCOUNTER — TELEPHONE (OUTPATIENT)
Dept: RADIATION ONCOLOGY | Facility: HOSPITAL | Age: 79
End: 2024-10-23
Payer: MEDICARE

## 2024-10-23 DIAGNOSIS — C34.91 MALIGNANT NEOPLASM OF UNSPECIFIED PART OF RIGHT BRONCHUS OR LUNG (MULTI): Primary | ICD-10-CM

## 2024-10-23 NOTE — TELEPHONE ENCOUNTER
Called pt to remind of appointment on 10/24/2024 at 1:00. Pt's phone went to voicemail but the mailbox wasn't set up for me to leave a message

## 2024-10-24 ENCOUNTER — HOSPITAL ENCOUNTER (OUTPATIENT)
Dept: RADIOLOGY | Facility: HOSPITAL | Age: 79
Discharge: HOME | End: 2024-10-24
Payer: MEDICARE

## 2024-10-24 ENCOUNTER — HOSPITAL ENCOUNTER (OUTPATIENT)
Dept: RADIATION ONCOLOGY | Facility: HOSPITAL | Age: 79
Setting detail: RADIATION/ONCOLOGY SERIES
Discharge: HOME | End: 2024-10-24
Payer: MEDICARE

## 2024-10-24 ENCOUNTER — LAB (OUTPATIENT)
Dept: LAB | Facility: HOSPITAL | Age: 79
End: 2024-10-24
Payer: MEDICARE

## 2024-10-24 ENCOUNTER — TELEPHONE (OUTPATIENT)
Dept: RADIATION ONCOLOGY | Facility: HOSPITAL | Age: 79
End: 2024-10-24
Payer: MEDICARE

## 2024-10-24 VITALS
HEART RATE: 88 BPM | OXYGEN SATURATION: 96 % | DIASTOLIC BLOOD PRESSURE: 92 MMHG | RESPIRATION RATE: 18 BRPM | TEMPERATURE: 96.8 F | SYSTOLIC BLOOD PRESSURE: 153 MMHG | BODY MASS INDEX: 26.62 KG/M2 | WEIGHT: 185.5 LBS

## 2024-10-24 DIAGNOSIS — C34.91 NON-SMALL CELL CANCER OF RIGHT LUNG (MULTI): Primary | ICD-10-CM

## 2024-10-24 DIAGNOSIS — C34.91 NON-SMALL CELL CANCER OF RIGHT LUNG (MULTI): ICD-10-CM

## 2024-10-24 LAB
ANION GAP SERPL CALC-SCNC: 13 MMOL/L (ref 10–20)
BUN SERPL-MCNC: 16 MG/DL (ref 6–23)
CALCIUM SERPL-MCNC: 9.5 MG/DL (ref 8.6–10.3)
CHLORIDE SERPL-SCNC: 105 MMOL/L (ref 98–107)
CO2 SERPL-SCNC: 26 MMOL/L (ref 21–32)
CREAT SERPL-MCNC: 0.82 MG/DL (ref 0.5–1.3)
EGFRCR SERPLBLD CKD-EPI 2021: 89 ML/MIN/1.73M*2
GLUCOSE SERPL-MCNC: 83 MG/DL (ref 74–99)
POTASSIUM SERPL-SCNC: 3.7 MMOL/L (ref 3.5–5.3)
SODIUM SERPL-SCNC: 140 MMOL/L (ref 136–145)

## 2024-10-24 PROCEDURE — G2211 COMPLEX E/M VISIT ADD ON: HCPCS | Performed by: NURSE PRACTITIONER

## 2024-10-24 PROCEDURE — 99214 OFFICE O/P EST MOD 30 MIN: CPT | Performed by: NURSE PRACTITIONER

## 2024-10-24 PROCEDURE — 82374 ASSAY BLOOD CARBON DIOXIDE: CPT

## 2024-10-24 PROCEDURE — 36415 COLL VENOUS BLD VENIPUNCTURE: CPT

## 2024-10-24 PROCEDURE — 71260 CT THORAX DX C+: CPT

## 2024-10-24 PROCEDURE — 2550000001 HC RX 255 CONTRASTS: Performed by: NURSE PRACTITIONER

## 2024-10-24 ASSESSMENT — ENCOUNTER SYMPTOMS
DIAPHORESIS: 0
FEVER: 0
CHEST TIGHTNESS: 0
MUSCULOSKELETAL NEGATIVE: 1
CHILLS: 0
UNEXPECTED WEIGHT CHANGE: 0
SHORTNESS OF BREATH: 1
PSYCHIATRIC NEGATIVE: 1
OCCASIONAL FEELINGS OF UNSTEADINESS: 0
NEUROLOGICAL NEGATIVE: 1
APPETITE CHANGE: 0
ACTIVITY CHANGE: 0
DEPRESSION: 0
CHOKING: 0
HEMATOLOGIC/LYMPHATIC NEGATIVE: 1
WHEEZING: 0
FATIGUE: 0
COUGH: 0
LOSS OF SENSATION IN FEET: 0
CARDIOVASCULAR NEGATIVE: 1
GASTROINTESTINAL NEGATIVE: 1
APNEA: 0

## 2024-10-24 ASSESSMENT — PATIENT HEALTH QUESTIONNAIRE - PHQ9
SUM OF ALL RESPONSES TO PHQ9 QUESTIONS 1 AND 2: 0
1. LITTLE INTEREST OR PLEASURE IN DOING THINGS: NOT AT ALL
2. FEELING DOWN, DEPRESSED OR HOPELESS: NOT AT ALL

## 2024-10-24 ASSESSMENT — PAIN SCALES - GENERAL: PAINLEVEL_OUTOF10: 0-NO PAIN

## 2024-10-24 NOTE — PROGRESS NOTES
Patient ID: [id number]     Mr. Smith is a 78 y/o M with HTN, BPH, who presented to  ED in May 2023 complaining of intermittent coughing & started to cough up sputum mixed with blood for 2 days and was found to have RUL  mass leading to a diagnosis of early stage NSCLC.     Work up details including pathology and imaging results are described below.      Bronchoscopy with EBUS on 06/15/2023 by Dr. Sutton which has confirmed invasive poorly differentiated adenocarcinoma with negative station 7 N. PDL1 M 1%, negative for targeting mutations.     He was seen by Dr. Reynolds in thoracic surgery and underwent additional work up with demonstrated poor PFTs and hence not a surgical candidate.     PFT 07/13/2023: FEV1 < 43% predicted, FVC < 56% prdicted, DLCO <26% predicted.    Stereotactic body radiation therapy (SBRT) to the RUL given from August 30, 2023 through September 11, 2023, 5 fractions, 60 Gy.      History of presenting illness    Nadine Smith is a 79 y.o. adult who presents today for follow up 1 year and 1 month s/p SBRT to the RUL. Patient is doing well. Energy is baseline. Appetite is good. Weight stable. Denies any changes in breathing. Endorses SOB with exertion. He has not seen the pulmonologist in some time. Using inhalers/nebulizer as prescribed. No oxygen requirements. Denies chest pain, back pain or fevers. No neurological symptoms. Following with Dr. Ester HASSAN.     Review of systems:  Review of Systems   Constitutional:  Negative for activity change, appetite change, chills, diaphoresis, fatigue, fever and unexpected weight change.   HENT: Negative.     Respiratory:  Positive for shortness of breath (with exertion). Negative for apnea, cough, choking, chest tightness and wheezing.    Cardiovascular: Negative.    Gastrointestinal: Negative.    Genitourinary: Negative.    Musculoskeletal: Negative.    Neurological: Negative.    Hematological: Negative.    Psychiatric/Behavioral: Negative.          Past Medical history  She  has a past surgical history that includes CT angio head w and wo IV contrast (05/18/2017); CT angio neck (05/18/2017); Colonoscopy; and Bronchoscopy.      Last recorded vital:  BP (!) 153/92   Pulse 88   Temp 36 °C (96.8 °F) (Skin)   Resp 18   Wt 84.1 kg (185 lb 8 oz)   SpO2 96%   BMI 26.62 kg/m²     Physical exam  Physical Exam  Constitutional:       General: She is not in acute distress.     Appearance: Normal appearance. She is normal weight. She is not ill-appearing, toxic-appearing or diaphoretic.   HENT:      Head: Normocephalic.      Mouth/Throat:      Mouth: Mucous membranes are moist.      Pharynx: Oropharynx is clear.   Eyes:      Conjunctiva/sclera: Conjunctivae normal.      Pupils: Pupils are equal, round, and reactive to light.   Cardiovascular:      Rate and Rhythm: Normal rate and regular rhythm.      Pulses: Normal pulses.      Heart sounds: Normal heart sounds.   Pulmonary:      Effort: Pulmonary effort is normal. No respiratory distress.      Breath sounds: No wheezing, rhonchi or rales.   Abdominal:      General: Bowel sounds are normal. There is no distension.      Palpations: Abdomen is soft. There is no mass.      Tenderness: There is no abdominal tenderness. There is no guarding or rebound.      Hernia: No hernia is present.   Musculoskeletal:         General: Normal range of motion.      Cervical back: Normal range of motion and neck supple.   Skin:     General: Skin is warm and dry.   Neurological:      General: No focal deficit present.      Mental Status: She is alert and oriented to person, place, and time.      Cranial Nerves: No cranial nerve deficit.      Sensory: No sensory deficit.      Motor: No weakness.      Coordination: Coordination normal.      Gait: Gait normal.   Psychiatric:         Mood and Affect: Mood normal.         Behavior: Behavior normal.         Thought Content: Thought content normal.         Judgment: Judgment normal.            Radiology results:  CT chest w IV contrast    Result Date: 10/24/2024  Interpreted By:  Nolan Wright, STUDY: CT CHEST W IV CONTRAST;  10/24/2024 12:03 pm   INDICATION: Signs/Symptoms:history of lung cancer s/p RT.   ,C34.91 Malignant neoplasm of unspecified part of right bronchus or lung (Multi)   COMPARISON: Exam dated 04/18/2024; 12/14/2023; and 06/10/2023   ACCESSION NUMBER(S): [id number]   ORDERING CLINICIAN: REJI LEDESMA   TECHNIQUE: Helical data acquisition of the chest was obtained following intravenous administration of 75 ML Omnipaque 350.  Images were reformatted in axial, coronal, and sagittal planes.   FINDINGS: LUNGS AND AIRWAYS: The trachea and central airways are patent. No endobronchial lesion is seen.   There has been interval increase in a spiculated area of nodular/masslike consolidation within the right upper lobe which now measures 3.1 x 1.5 cm (previously measuring 2.1 x 1.3 cm). There are additional increased areas of interlobular septal thickening as well as linear parenchymal opacities adjacent to this region. There is also a new nodule within the right upper lobe adjacent to these areas which measures 1.1 cm on series 3, image 114. These findings are noted on a background of moderate-to-severe upper lung predominant emphysema. Areas of dependent atelectasis versus scarring. Calcified granuloma in the left lower lobe. No pleural effusion or pneumothorax.   MEDIASTINUM AND LIZABETH, LOWER NECK AND AXILLA: Stable calcified nodule in the posterior left thyroid which is subcentimeter in size. Thyroid is otherwise within normal limits. No evidence of thoracic lymphadenopathy by CT criteria. Esophagus appears within normal limits as seen.   HEART AND VESSELS: There is stable mild dilation of the distal aortic arch and descending aorta which is tortuous. Max diameter of the descending aorta is 3.2 cm.There are mild scattered atherosclerosis present, including calcified and noncalcified  plaques. Main pulmonary artery and its branches are normal in caliber. Moderate coronary artery calcifications are seen. Please note,the study is not optimized for evaluation of coronary arteries. The cardiac chambers are not enlarged. There is no pericardial effusion seen.   UPPER ABDOMEN: Multiple well-circumscribed hepatic hypodensities with the largest measuring 2.1 cm in the inferior left hepatic lobe which has features most consistent with a simple cyst. The smaller lesions are incompletely characterized but most likely represent simple cysts/hemangiomas. 4.8 cm simple cyst in the left kidney is unchanged. Scattered diverticula within the visualized colon.       CHEST WALL AND OSSEOUS STRUCTURES: Chest wall is within normal limits. No acute osseous pathology.There are no suspicious osseous lesions.       1.  Interval increase in spiculated area of masslike consolidation in the right upper lobe which now measures 3.1 x 1.5 cm as well as additional increased areas of interlobular septal thickening and linear parenchymal opacity. There is also interval appearance of a 1.1 cm nodule within the right upper lobe which may be associated with the linear parenchymal opacities. While these findings may represent evolving postradiation changes/fibrosis, local disease recurrence is not excluded and continued close attention on follow-up imaging is recommended. 2. Severe upper lung predominant emphysema. 3. Additional chronic and incidental findings as above.   Signed by: Nolan Wright 10/24/2024 1:30 PM Dictation workstation:   [id number]    Plan:  Assessment/Plan     79 year old male 1 year and 1 months s/p SBRT to the RUL. Patient is doing well with no acute complaints related to treatment. Continue to use inhalers and nebulizer as prescribed. CT scan done today prior to this appt. Scan shows interval increase in spiculated area of masslike consolidation in the right upper lobe which now measures 3.1 x 1.5 cm as well  as additional increased areas of interlobular septal thickening and linear parenchymal opacity. There is also interval appearance of a 1.1 cm nodule within the right upper lobe which may be associated with the linear parenchymal opacities. While these findings may represent evolving postradiation changes/fibrosis, local disease recurrence is not excluded and continued close attention on follow-up imaging is recommended. Severe upper lung predominant emphysema. Patient to return to clinic in  3 months with same day CT of the chest. Instructed to call with any questions or concerns.

## 2024-10-28 ENCOUNTER — HOSPITAL ENCOUNTER (OUTPATIENT)
Dept: VASCULAR MEDICINE | Facility: HOSPITAL | Age: 79
Discharge: HOME | End: 2024-10-28
Payer: MEDICARE

## 2024-10-28 DIAGNOSIS — R60.0 LOCALIZED EDEMA: ICD-10-CM

## 2024-10-28 PROCEDURE — 93970 EXTREMITY STUDY: CPT | Performed by: SURGERY

## 2024-10-28 PROCEDURE — 93970 EXTREMITY STUDY: CPT

## 2024-11-15 ENCOUNTER — HOSPITAL ENCOUNTER (OUTPATIENT)
Dept: CARDIOLOGY | Facility: HOSPITAL | Age: 79
Discharge: HOME | End: 2024-11-15
Payer: MEDICARE

## 2024-11-15 DIAGNOSIS — R60.0 LOCALIZED EDEMA: ICD-10-CM

## 2024-11-15 DIAGNOSIS — R06.00 DYSPNEA: ICD-10-CM

## 2024-11-15 PROCEDURE — 93306 TTE W/DOPPLER COMPLETE: CPT

## 2024-11-21 LAB
AORTIC VALVE PEAK VELOCITY: 1.85 M/S
AV PEAK GRADIENT: 14 MMHG
AVA (PEAK VEL): 2.62 CM2
EJECTION FRACTION APICAL 4 CHAMBER: 76.4
EJECTION FRACTION: 68 %
LEFT ATRIUM VOLUME AREA LENGTH INDEX BSA: 23.7 ML/M2
LEFT VENTRICLE INTERNAL DIMENSION DIASTOLE: 4.5 CM (ref 3.5–6)
LEFT VENTRICULAR OUTFLOW TRACT DIAMETER: 2 CM
MITRAL VALVE E/A RATIO: 0.73
RIGHT VENTRICLE FREE WALL PEAK S': 15.9 CM/S
TRICUSPID ANNULAR PLANE SYSTOLIC EXCURSION: 2.8 CM

## 2024-12-10 DIAGNOSIS — J44.9 CHRONIC OBSTRUCTIVE PULMONARY DISEASE, UNSPECIFIED COPD TYPE (MULTI): ICD-10-CM

## 2024-12-12 RX ORDER — UMECLIDINIUM BROMIDE AND VILANTEROL TRIFENATATE 62.5; 25 UG/1; UG/1
1 POWDER RESPIRATORY (INHALATION) DAILY
Qty: 60 EACH | Refills: 5 | Status: SHIPPED | OUTPATIENT
Start: 2024-12-12

## 2025-01-23 ENCOUNTER — HOSPITAL ENCOUNTER (OUTPATIENT)
Dept: RADIATION ONCOLOGY | Facility: HOSPITAL | Age: 80
Setting detail: RADIATION/ONCOLOGY SERIES
Discharge: HOME | End: 2025-01-23
Payer: MEDICARE

## 2025-01-23 ENCOUNTER — OFFICE VISIT (OUTPATIENT)
Dept: PULMONOLOGY | Facility: HOSPITAL | Age: 80
End: 2025-01-23
Payer: MEDICARE

## 2025-01-23 ENCOUNTER — HOSPITAL ENCOUNTER (OUTPATIENT)
Dept: RADIOLOGY | Facility: HOSPITAL | Age: 80
Discharge: HOME | End: 2025-01-23
Payer: MEDICARE

## 2025-01-23 VITALS
TEMPERATURE: 97.7 F | RESPIRATION RATE: 18 BRPM | BODY MASS INDEX: 27.59 KG/M2 | WEIGHT: 192.3 LBS | OXYGEN SATURATION: 94 % | SYSTOLIC BLOOD PRESSURE: 167 MMHG | DIASTOLIC BLOOD PRESSURE: 97 MMHG | HEART RATE: 57 BPM

## 2025-01-23 VITALS
BODY MASS INDEX: 27.55 KG/M2 | SYSTOLIC BLOOD PRESSURE: 179 MMHG | WEIGHT: 192 LBS | TEMPERATURE: 97.9 F | DIASTOLIC BLOOD PRESSURE: 87 MMHG | OXYGEN SATURATION: 94 % | HEART RATE: 60 BPM

## 2025-01-23 DIAGNOSIS — J44.9 CHRONIC OBSTRUCTIVE PULMONARY DISEASE, UNSPECIFIED COPD TYPE (MULTI): Primary | ICD-10-CM

## 2025-01-23 DIAGNOSIS — C34.91 NON-SMALL CELL CANCER OF RIGHT LUNG (MULTI): ICD-10-CM

## 2025-01-23 DIAGNOSIS — C34.91 NON-SMALL CELL CANCER OF RIGHT LUNG (MULTI): Primary | ICD-10-CM

## 2025-01-23 DIAGNOSIS — J43.9 PULMONARY EMPHYSEMA, UNSPECIFIED EMPHYSEMA TYPE (MULTI): ICD-10-CM

## 2025-01-23 PROCEDURE — 71250 CT THORAX DX C-: CPT

## 2025-01-23 PROCEDURE — 99214 OFFICE O/P EST MOD 30 MIN: CPT | Performed by: NURSE PRACTITIONER

## 2025-01-23 PROCEDURE — 99214 OFFICE O/P EST MOD 30 MIN: CPT | Mod: 25 | Performed by: NURSE PRACTITIONER

## 2025-01-23 PROCEDURE — 1036F TOBACCO NON-USER: CPT | Performed by: NURSE PRACTITIONER

## 2025-01-23 PROCEDURE — 1126F AMNT PAIN NOTED NONE PRSNT: CPT | Performed by: NURSE PRACTITIONER

## 2025-01-23 PROCEDURE — 1159F MED LIST DOCD IN RCRD: CPT | Performed by: NURSE PRACTITIONER

## 2025-01-23 PROCEDURE — G2211 COMPLEX E/M VISIT ADD ON: HCPCS | Performed by: NURSE PRACTITIONER

## 2025-01-23 RX ORDER — ALBUTEROL SULFATE 0.83 MG/ML
3 SOLUTION RESPIRATORY (INHALATION) ONCE
OUTPATIENT
Start: 2025-01-23 | End: 2025-01-23

## 2025-01-23 RX ORDER — ALBUTEROL SULFATE 0.83 MG/ML
2.5 SOLUTION RESPIRATORY (INHALATION) 4 TIMES DAILY PRN
Qty: 180 ML | Refills: 5 | Status: SHIPPED | OUTPATIENT
Start: 2025-01-23 | End: 2026-01-23

## 2025-01-23 RX ORDER — ALBUTEROL SULFATE 90 UG/1
2 INHALANT RESPIRATORY (INHALATION) EVERY 4 HOURS PRN
Qty: 24 G | Refills: 3 | Status: SHIPPED | OUTPATIENT
Start: 2025-01-23 | End: 2026-01-23

## 2025-01-23 RX ORDER — ALBUTEROL SULFATE 90 UG/1
1 INHALANT RESPIRATORY (INHALATION) ONCE
OUTPATIENT
Start: 2025-01-23

## 2025-01-23 ASSESSMENT — ENCOUNTER SYMPTOMS
CHEST TIGHTNESS: 0
BACK PAIN: 0
GASTROINTESTINAL NEGATIVE: 1
ACTIVITY CHANGE: 0
FATIGUE: 0
CHOKING: 0
PSYCHIATRIC NEGATIVE: 1
COUGH: 0
AGITATION: 0
JOINT SWELLING: 0
SINUS PRESSURE: 0
WEAKNESS: 0
NERVOUS/ANXIOUS: 0
DIZZINESS: 0
LOSS OF SENSATION IN FEET: 0
SHORTNESS OF BREATH: 1
WHEEZING: 1
HEADACHES: 0
NUMBNESS: 0
DIARRHEA: 0
UNEXPECTED WEIGHT CHANGE: 0
RHINORRHEA: 0
DEPRESSION: 0
MYALGIAS: 0
ABDOMINAL PAIN: 0
FEVER: 0
CARDIOVASCULAR NEGATIVE: 1
OCCASIONAL FEELINGS OF UNSTEADINESS: 0
NAUSEA: 0
EYE PAIN: 0
VOICE CHANGE: 0
APNEA: 0
FATIGUE: 0
FEVER: 0
DIAPHORESIS: 0
MUSCULOSKELETAL NEGATIVE: 1
CHILLS: 0
VOMITING: 0
HEMATOLOGIC/LYMPHATIC NEGATIVE: 1
ARTHRALGIAS: 0
NEUROLOGICAL NEGATIVE: 1
APPETITE CHANGE: 0
PALPITATIONS: 0

## 2025-01-23 ASSESSMENT — PATIENT HEALTH QUESTIONNAIRE - PHQ9
SUM OF ALL RESPONSES TO PHQ9 QUESTIONS 1 AND 2: 0
2. FEELING DOWN, DEPRESSED OR HOPELESS: NOT AT ALL
1. LITTLE INTEREST OR PLEASURE IN DOING THINGS: NOT AT ALL

## 2025-01-23 ASSESSMENT — PAIN SCALES - GENERAL
PAINLEVEL_OUTOF10: 0-NO PAIN
PAINLEVEL_OUTOF10: 0-NO PAIN

## 2025-01-23 ASSESSMENT — COLUMBIA-SUICIDE SEVERITY RATING SCALE - C-SSRS
1. IN THE PAST MONTH, HAVE YOU WISHED YOU WERE DEAD OR WISHED YOU COULD GO TO SLEEP AND NOT WAKE UP?: NO
6. HAVE YOU EVER DONE ANYTHING, STARTED TO DO ANYTHING, OR PREPARED TO DO ANYTHING TO END YOUR LIFE?: NO
2. HAVE YOU ACTUALLY HAD ANY THOUGHTS OF KILLING YOURSELF?: NO

## 2025-01-23 NOTE — PROGRESS NOTES
Patient: Nadine Smith    [id number]  : 1945 -- AGE 79 y.o.    Provider: HARRISON Mari-CNP     Location [facility]   Service Date: 2025              [facility]  Follow up visit note      HISTORY OF PRESENT ILLNESS       HISTORY OF PRESENT ILLNESS     Mr. Smith is a 78 year old AAM (former smoker~50 pack years ) here for follow up related to lung cancer. Bronchoscopy with biopsy showed adenocarcinoma. Last visit 10/2/23     PCP: Dr. Munoz   Hem/Onc: Dr. Jordan   Rad/Onc: Dr. Lee     Since last visit he has been doing ok. He was using bevespi BID to anoro. He does not like the anoro because it causes a bitter taste in his mouth.  He uses his PRN albuterol HFA 6-7 x per day and albuterol nebulizers 4x a day. He denies any productive cough -- has a dry cough. He will at times notice wheezing. His wife states his SPARKS is worse - he states he agrees. He has SOB at times with tying his shoes. He denies any SOB at rest, CP, or allergies.   GERD occasionally depending on what he eats - varies.        23: He has been using his bevespi twice daily, albuterol nebulizer 4x a day, and albuterol HFA a few times a week. We were able to get him a new nebulizer machine last visit. He denies any cough. He has had some wheezing intermittently. He has SPARKS with going up stairs and walking on level ground after a few minutes. He has noticed some episodes of SOB at rest. He denies any CP or allergies. He will notice some GERD symptoms - depending on what he eats.     10/2/23: Since last visit he underwent SBRT from 2023- 2023. He states overall he is ok. He states at times his breathing gets a little tired. He was able to get the PRN albuterol and has found it helpful. He has used it a couple times a day. He has wheezing at night and his wife will hear it during the day as well. He has SPARKS with walking on level ground after a  few minutes and with going up stairs. He denies any cough, SOB at rest, CP, or GERD. He denies any nasal nasal congestion or runny nose. He does have post nasal drip/ throat clearing. GERD He has been told he snores -- no witnessed apneas.       6/15/23: Mr. Smith presented to the ED with hemoptysis and was found to have a RUL lung mass. He had bronchoscopy with biopsy that showed adenocarcinoma. LN 7 biopsied and negative for malignancy. He denies any SPARKS. He has PRN proair at home - he will use it if he is wheezing. He uses his rescue 4-5 x a week. He has been on the albuterol for the last 2 years.He notices intermittent wheezing. He denies any cough, SOB at rest, allergies, or GERD. HE had some CP from his bronchoscopy - still a little sore, but much improved from previous. He has been having LE swelling. He had COVID in 10/ 2021 - went to PCP at [facility]. He had infection on his foot - happened to find out he was positive from this visit. He states his swelling has been going on intermittently for a number of years.      Previous pulmonary history: He has no history of recurrent infections, or lung disease as a child. He had no previous lung hx, never on oxygen or inhaler therapy.      Inhalers/nebulized medications: PRN albuterol      Hospitalization History: He has not been hospitalized over the last year for breathing related problem.     Sleep history: Denies snoring, apnea, feeling tired during the day or taking naps during the day. He will doze a times.        ALLERGIES AND MEDICATIONS     ALLERGIES  No Known Allergies    MEDICATIONS  Current Outpatient Medications   Medication Sig Dispense Refill    albuterol 2.5 mg /3 mL (0.083 %) nebulizer solution TAKE 3 ML (2.5 MG) BY NEBULIZATION 4 TIMES A DAY AS NEEDED FOR WHEEZING OR SHORTNESS OF BREATH. 75 mL 3    amLODIPine (Norvasc) 10 mg tablet Take 1 tablet (10 mg) by mouth once daily.      aspirin 81 mg EC tablet Take 1 tablet (81 mg) by mouth once daily.       Bevespi Aerosphere 9-4.8 mcg HFA aerosol inhaler INHALE 2 PUFFS 2 TIMES A DAY. 10.7 g 3    finasteride (Proscar) 5 mg tablet Take 1 tablet (5 mg) by mouth once daily.      tamsulosin (Flomax) 0.4 mg 24 hr capsule Take 1 capsule (0.4 mg) by mouth 2 times a day. 30 minutes after the same meal each day.      atorvastatin (Lipitor) 10 mg tablet Atorvastatin Calcium 10 MG Oral Tablet   Quantity: 30  Refills: 0        Start : 15-Dec-2014   Active (Patient not taking: Reported on 1/23/2025)      chlorhexidine (Peridex) 0.12 % solution Use 15 mL in the mouth or throat see administration instructions for 2 doses. Use the night before and morning of surgery. (Patient not taking: Reported on 1/23/2025) 473 mL 0    docusate sodium (Colace) 100 mg capsule Take 1 capsule (100 mg) by mouth 2 times a day. (Patient not taking: Reported on 1/23/2025) 15 capsule 0    fluocinonide (Lidex) 0.05 % ointment Fluocinonide 0.05 % External Ointment   Quantity: 60  Refills: 0        Start : 1-Oct-2014   Active (Patient not taking: Reported on 1/23/2025)      nystatin (Mycostatin) cream Nystatin 031866 UNIT/GM External Cream   Quantity: 120  Refills: 0        Start : 26-Sep-2016   Active (Patient not taking: Reported on 1/23/2025)      oxyCODONE (Roxicodone) 5 mg immediate release tablet Take 1 tablet (5 mg) by mouth every 6 hours if needed for severe pain (7 - 10). (Patient not taking: Reported on 1/23/2025) 10 tablet 0    umeclidinium-vilanteroL (Anoro Ellipta) 62.5-25 mcg/actuation blister with device INHALE 1 PUFF ONCE DAILY. (Patient not taking: Reported on 1/23/2025) 60 each 5     No current facility-administered medications for this visit.         PAST HISTORY     PAST MEDICAL HISTORY  - HTN   - BPH   - lung cancer - adenocarcinoma - s/p SBRT 8/2023  -9/2023     PAST SURGICAL HISTORY  Past Surgical History:   Procedure Laterality Date    BRONCHOSCOPY      Bronchoscopy with biopsy showed adenocarcinoma.    COLONOSCOPY      CT ANGIO NECK   05/18/2017    CT NECK ANGIO W AND WO IV CONTRAST 5/18/2017 [facility]    CT HEAD ANGIO W AND WO IV CONTRAST  05/18/2017    CT HEAD ANGIO W AND WO IV CONTRAST 5/18/2017 [facility]       IMMUNIZATION HISTORY  Immunization History   Administered Date(s) Administered    Flu vaccine, trivalent, preservative free, age 6 months and greater (Fluarix/Fluzone/Flulaval) 08/01/2015    Meningococcal ACWY-D (Menactra) 4-valent conjugate vaccine 08/01/2015    Moderna SARS-CoV-2 Vaccination 04/14/2021, 05/12/2021       SOCIAL HISTORY  smoking: [age] 5-10 cigarettes per day - quit last month ~50 pack years   drinking: none  illicit drug use: none       OCCUPATIONAL/ENVIRONMENTAL HISTORY  Occupation: 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     RESULTS/DATA     Pulmonary Function Test Results   PFTs: 7/13/23 - FEV1/FVC 0.57/ FEV1 1.06 (43%)/ FVC 1.84 (56%)/ DLCO 29%         Chest Radiograph     XR chest 2 view 06/09/2023  1. Redemonstration of 4 cm ovoid mass in the right upper lobe.  2. Minimal bibasilar atelectasis.      Chest CT Scan   CT chest:   - 5/16/23 - IMPRESSION: No evidence of pulmonary embolism. Right upper lobe pulmonary mass measuring 3.7 x 2.7 cm is highly suspicious for malignancy. Adjacent patchy airspace disease is demonstrated in the right upper lobe. Small nodular density is also identified in the left upper lobe measuring 6 mm. Small low-attenuation lesions are identified in the visualized left hepatic lobe. Findings may represent cysts; however, metastatic lesions are not entirely excluded.   - 6/10/23 - Stable appearance of heterogeneous mass in the anterior segment of the right upper lobe, consistent with known lung malignancy. There is adjacent irregular interlobular thickening extending to the subpleural and sub-fissural planes, concerning for a component of lymphangitis. An  additional stable punctate nodular opacity is indeterminate, however suspicious for a satellite nodule, in the setting of the additional changes as above. 2. Stable appearance of enlarged and prominent mediastinal lymph nodes, suspicious for glenis involvement. ABDOMEN-PELVIS: 1. No evidence of metastatic involvement in the abdomen and pelvis. 2. Cholelithiasis. 3. Punctate sclerotic focus in the body of L3, favored to represent a bony island, however attention on follow-up is recommended. 4. Additional findings as above   - 12/14/23- Interval decreased size of a right upper lobe pulmonary lesion with spiculated margins now measuring 2.6 x 1.5 cm. There are adjacent postradiation changes. 2. No evidence of new lesion throughout the bilateral lungs. 3. No significant mediastinal or hilar lymphadenopathy.4. Background of moderate emphysematous changes of the bilateral lungs. 5. Additional findings as above.    - 4/18/24 - Continued interval decrease in size of right upper lobe pulmonary  mass as described above.  2. Stable small pulmonary nodules as described above with no new  pulmonary nodules appreciated.  3. Moderate upper lung predominant emphysema.     10/24/24 - Interval increase in spiculated area of masslike consolidation in  the right upper lobe which now measures 3.1 x 1.5 cm as well as  additional increased areas of interlobular septal thickening and  linear parenchymal opacity. There is also interval appearance of a  1.1 cm nodule within the right upper lobe which may be associated  with the linear parenchymal opacities. While these findings may  represent evolving postradiation changes/fibrosis, local disease  recurrence is not excluded and continued close attention on follow-up  imaging is recommended.  2. Severe upper lung predominant emphysema.  3. Additional chronic and incidental findings as above.    1/23/25 -     PET: 7/12/23: Hypermetabolic anterior right upper lobe mass consistent  with  biopsy-proven malignancy.  2. Nonspecific mildly hypermetabolic mediastinal and bilateral hilar  lymph nodes which may be reactive in nature, however metastatic  disease can not be definitely excluded.  3. Hypermetabolic focus in the region of the right parotid gland  which is favored to represent a benign process such as a Warthin's  tumor with a metastatic lymph node felt to be less likely.     Echocardiogram     Echo: 7/13/23: EF 60-65%, RA normal size, RV normal size and function.    11/15/24 -   Left ventricular ejection fraction is normal, by visual estimate at 65-70%.   2. There is normal right ventricular global systolic function.   3. No significant valvular heart disease.   4. No pericardial effusion.   RA normal size, RV normal size and function         Labs/ Other testing      Lung biopsy - 6/7/23 - LUNG, RIGHT UPPER LOBE NODULE, BIOPSY: --INVASIVE POORLY DIFFERENTIATED ADENOCARCINOMA ADENOCARCINOMA. SEE NOTE. --SEE CONCURRENT CYTOLOGY SPECIMEN [id number].   B. FINE NEEDLE ASPIRATION 7 LYMPH NODE, CYTOLOGY AND CELL BLOCK: NO MALIGNANT CELLS IDENTIFIED. LYMPHOID SAMPLE.    REVIEW OF SYSTEMS     REVIEW OF SYSTEMS  Review of Systems   Constitutional:  Negative for fatigue and fever.   HENT:  Negative for congestion, postnasal drip, rhinorrhea, sinus pressure and voice change.    Eyes:  Negative for pain and visual disturbance.   Cardiovascular:  Negative for chest pain, palpitations and leg swelling.   Gastrointestinal:  Negative for abdominal pain, diarrhea, nausea and vomiting.   Endocrine: Negative for cold intolerance and heat intolerance.   Musculoskeletal:  Negative for arthralgias, back pain, joint swelling and myalgias.   Skin:  Negative for rash.   Neurological:  Negative for dizziness, weakness, numbness and headaches.   Psychiatric/Behavioral:  Negative for agitation. The patient is not nervous/anxious.          PHYSICAL EXAM     VITAL SIGNS: /87   Pulse 60   Temp 36.6 °C (97.9 °F)    Wt 87.1 kg (192 lb)   SpO2 94% Comment: RA  BMI 27.55 kg/m²      CURRENT WEIGHT: [unfilled]  BMI: [unfilled]  PREVIOUS WEIGHTS:  Wt Readings from Last 3 Encounters:   01/23/25 87.1 kg (192 lb)   01/23/25 87.2 kg (192 lb 4.8 oz)   10/24/24 84.1 kg (185 lb 8 oz)       Physical Exam  Vitals reviewed.   Constitutional:       General: She is not in acute distress.     Appearance: Normal appearance. She is not ill-appearing or toxic-appearing.   HENT:      Head: Normocephalic.      Nose: No rhinorrhea.   Cardiovascular:      Rate and Rhythm: Normal rate and regular rhythm.      Heart sounds: Normal heart sounds.   Pulmonary:      Effort: Pulmonary effort is normal. No respiratory distress.      Breath sounds: Normal breath sounds. No stridor. No wheezing, rhonchi or rales.   Abdominal:      General: Abdomen is flat.   Musculoskeletal:         General: Normal range of motion.      Right lower leg: No edema.      Left lower leg: No edema.   Skin:     General: Skin is warm and dry.      Nails: There is no clubbing.   Neurological:      General: No focal deficit present.      Mental Status: She is alert and oriented to person, place, and time.   Psychiatric:         Mood and Affect: Mood normal.         Behavior: Behavior normal.         Judgment: Judgment normal.         ASSESSMENT/PLAN       1. Lung cancer: adenocarcinoma - 7LN biopsy negative for malignancy- s/p SBRT 2023  - continue to follow with  Rad/ Onc      2. COPD: PFTs with severe obstruction. Echo with normal EF.   - stop anoro   - start breztri 2 puffs twice daily - rinse mouth out afterwards   - continue albuterol HFA 2 puffs or albuterol nebulizers every 4-6 hours as needed   - will get updated PFTs - call [phone] to schedule with next appt   - check pulse ox - if dropping <88% with walking - if     Thank you for visiting the Pulmonary clinic today!   Return to clinic  6 months with Pfts  or sooner if needed   Gabbie Sutton CNP  My office -  [phone]- Jessica is my . Radha is my nurse [phone].   Radiology scheduling [phone]   Appointment scheduling [phone]   Pulmonary function testing - [phone]

## 2025-01-23 NOTE — PROGRESS NOTES
Patient ID: [id number]     Mr. Smith is a 80 y/o M with HTN, BPH, who presented to  ED in May 2023 complaining of intermittent coughing & started to cough up sputum mixed with blood for 2 days and was found to have RUL  mass leading to a diagnosis of early stage NSCLC.     Work up details including pathology and imaging results are described below.      Bronchoscopy with EBUS on 06/15/2023 by Dr. Sutton which has confirmed invasive poorly differentiated adenocarcinoma with negative station 7 N. PDL1 M 1%, negative for targeting mutations.     He was seen by Dr. Reynolds in thoracic surgery and underwent additional work up with demonstrated poor PFTs and hence not a surgical candidate.     PFT 07/13/2023: FEV1 < 43% predicted, FVC < 56% prdicted, DLCO <26% predicted.    Stereotactic body radiation therapy (SBRT) to the RUL given from August 30, 2023 through September 11, 2023, 5 fractions, 60 Gy.      History of presenting illness    Nadine Smith is a 79 y.o. adult who presents today for follow up 1 year and 4 month s/p SBRT to the RUL. Patient is doing well. Energy is baseline. Appetite is good. Weight stable. States he is wheezing a little bit more.  Endorses SOB with exertion. No cough. Using inhalers/nebulizer as prescribed. Sees pulmonologist after this appointment. No oxygen requirements. Denies chest pain, back pain or fevers. No neurological symptoms. Following with Dr. Ester HASSAN.     Review of systems:  Review of Systems   Constitutional:  Negative for activity change, appetite change, chills, diaphoresis, fatigue, fever and unexpected weight change.   HENT: Negative.     Respiratory:  Positive for shortness of breath (with exertion) and wheezing. Negative for apnea, cough, choking and chest tightness.    Cardiovascular: Negative.    Gastrointestinal: Negative.    Genitourinary: Negative.    Musculoskeletal: Negative.    Neurological: Negative.    Hematological: Negative.     Psychiatric/Behavioral: Negative.         Past Medical history  She  has a past surgical history that includes CT angio head w and wo IV contrast (05/18/2017); CT angio neck (05/18/2017); Colonoscopy; and Bronchoscopy.      Last recorded vital:  BP (!) 167/97   Pulse 57   Temp 36.5 °C (97.7 °F) (Skin)   Resp 18   Wt 87.2 kg (192 lb 4.8 oz)   SpO2 94%   BMI 27.59 kg/m²     Physical exam  Physical Exam  Constitutional:       General: She is not in acute distress.     Appearance: Normal appearance. She is normal weight. She is not ill-appearing, toxic-appearing or diaphoretic.   HENT:      Head: Normocephalic.      Mouth/Throat:      Mouth: Mucous membranes are moist.      Pharynx: Oropharynx is clear.   Eyes:      Conjunctiva/sclera: Conjunctivae normal.      Pupils: Pupils are equal, round, and reactive to light.   Cardiovascular:      Rate and Rhythm: Normal rate and regular rhythm.      Pulses: Normal pulses.      Heart sounds: Normal heart sounds.   Pulmonary:      Effort: Pulmonary effort is normal. No respiratory distress.      Breath sounds: No wheezing, rhonchi or rales.   Abdominal:      General: Bowel sounds are normal. There is no distension.      Palpations: Abdomen is soft. There is no mass.      Tenderness: There is no abdominal tenderness. There is no guarding or rebound.      Hernia: No hernia is present.   Musculoskeletal:         General: Normal range of motion.      Cervical back: Normal range of motion and neck supple.   Skin:     General: Skin is warm and dry.   Neurological:      General: No focal deficit present.      Mental Status: She is alert and oriented to person, place, and time.      Cranial Nerves: No cranial nerve deficit.      Sensory: No sensory deficit.      Motor: No weakness.      Coordination: Coordination normal.      Gait: Gait normal.   Psychiatric:         Mood and Affect: Mood normal.         Behavior: Behavior normal.         Thought Content: Thought content normal.          Judgment: Judgment normal.         Radiology results:  No results found.   Plan:  Assessment/Plan     79 year old male 1 year and 4 months s/p SBRT to the RUL. Patient is doing well with no acute complaints related to treatment. Continue to use inhalers and nebulizer as prescribed. Continue follow up with pulmonologist as scheduled.  CT scan done today prior to this appt. Scan shows Patient to return to clinic in  3 months with same day CT of the chest. Instructed to call with any questions or concerns.             cm.There is moderate calcified atherosclerosis  present. Main pulmonary artery and its branches are normal in caliber.  Mild to moderate coronary artery calcifications are seen.Please  note,the study is not optimized for evaluation of coronary arteries.  The cardiac chambers are not enlarged. There is no pericardial  effusion seen.    UPPER ABDOMEN:  Again noted are multiple hypodense lesion in the left hepatic lobe  similar to the prior exam measuring up to 1.7 cm (series 2, image  283) with simple fluid attenuation. There is a renal cysts in the  left upper pole measuring 4.7 cm.        CHEST WALL AND OSSEOUS STRUCTURES:  Small bilateral gynecomastia. Otherwise, chest wall is within normal  limits. No acute osseous pathology.There are no suspicious osseous  lesions.    Impression  1.  Interval slightly increased size of a masslike consolidation with  spiculated margins in the right upper lobe now measuring 3.6 x 1.6 cm  as well as additional increased area of interlobular septal  thickening and linear parenchymal opacities. There has been also  interval slightly increased size of an additional ill-defined nodular  opacity in the right upper lobe posteriorly measuring 1.4 x 1.2 cm.  There is surrounding postradiation changes. Considering the gradual  increase in size of the nodular consolidations, there is concern for  possible local disease recurrence. May consider PET-CT for better  characterization followed by tissue diagnosis if clinically indicated.    I personally reviewed the images/study and I agree with the findings  as stated by resident physician Dr. Jose Alfredo Rousseau . This study  was interpreted at [place], [place].    MACRO:  None    Signed by: Aneesh Rivera 1/23/2025 6:08 PM  Dictation workstation:   [id number]    Plan:  Assessment/Plan     79 year old male 1 year and 4 months s/p SBRT to the RUL. Patient is doing well with no acute complaints related  to treatment. Continue to use inhalers and nebulizer as prescribed. Continue follow up with pulmonologist as scheduled.  CT scan done today prior to this appt. Scan shows interval slightly increased size of a masslike consolidation with spiculated margins in the right upper lobe now measuring 3.6 x 1.6 cm as well as additional increased area of interlobular septal thickening and linear parenchymal opacities. There has been also interval slightly increased size of an additional ill-defined nodular opacity in the right upper lobe posteriorly measuring 1.4 x 1.2 cm. There is surrounding postradiation changes. Considering the gradual increase in size of the nodular consolidations, there is concern for possible local disease recurrence. May consider PET-CT for better characterization followed by tissue diagnosis if clinically indicated.  Will order PET CT for first available to further assess and call patient with results and plan. Instructed to call with any questions or concerns.

## 2025-01-23 NOTE — PATIENT INSTRUCTIONS
1. Lung cancer: adenocarcinoma - 7LN biopsy negative for malignancy- s/p SBRT 2023  - continue to follow with  Rad/ Onc      2. COPD: PFTs with severe obstruction. Echo with normal EF.   - stop anoro   - start breztri 2 puffs twice daily - rinse mouth out afterwards   - continue albuterol HFA 2 puffs or albuterol nebulizers every 4-6 hours as needed   - will get updated PFTs - call [phone] to schedule with next appt   - check pulse ox - if dropping <88% with walking - if     Thank you for visiting the Pulmonary clinic today!   Return to clinic  6 months with Pfts  or sooner if needed   Gabbie Sutton CNP  My office -  [phone]- Jessica is my . Radha is my nurse [phone].   Radiology scheduling [phone]   Appointment scheduling [phone]   Pulmonary function testing - [phone]

## 2025-01-23 NOTE — H&P (VIEW-ONLY)
Patient: Nadine Smith    [id number]  : 1945 -- AGE 79 y.o.    Provider: HARRISON Mari-CNP     Location [facility]   Service Date: 2025              [facility]  Follow up visit note      HISTORY OF PRESENT ILLNESS       HISTORY OF PRESENT ILLNESS     Mr. Smith is a 78 year old AAM (former smoker~50 pack years ) here for follow up related to lung cancer. Bronchoscopy with biopsy showed adenocarcinoma. Last visit 10/2/23     PCP: Dr. Munoz   Hem/Onc: Dr. Jordan   Rad/Onc: Dr. Lee     Since last visit he has been doing ok. He was using bevespi BID to anoro. He does not like the anoro because it causes a bitter taste in his mouth.  He uses his PRN albuterol HFA 6-7 x per day and albuterol nebulizers 4x a day. He denies any productive cough -- has a dry cough. He will at times notice wheezing. His wife states his SPARKS is worse - he states he agrees. He has SOB at times with tying his shoes. He denies any SOB at rest, CP, or allergies.   GERD occasionally depending on what he eats - varies.        23: He has been using his bevespi twice daily, albuterol nebulizer 4x a day, and albuterol HFA a few times a week. We were able to get him a new nebulizer machine last visit. He denies any cough. He has had some wheezing intermittently. He has SPARKS with going up stairs and walking on level ground after a few minutes. He has noticed some episodes of SOB at rest. He denies any CP or allergies. He will notice some GERD symptoms - depending on what he eats.     10/2/23: Since last visit he underwent SBRT from 2023- 2023. He states overall he is ok. He states at times his breathing gets a little tired. He was able to get the PRN albuterol and has found it helpful. He has used it a couple times a day. He has wheezing at night and his wife will hear it during the day as well. He has SAPRKS with walking on level ground after a  few minutes and with going up stairs. He denies any cough, SOB at rest, CP, or GERD. He denies any nasal nasal congestion or runny nose. He does have post nasal drip/ throat clearing. GERD He has been told he snores -- no witnessed apneas.       6/15/23: Mr. Smith presented to the ED with hemoptysis and was found to have a RUL lung mass. He had bronchoscopy with biopsy that showed adenocarcinoma. LN 7 biopsied and negative for malignancy. He denies any SPARKS. He has PRN proair at home - he will use it if he is wheezing. He uses his rescue 4-5 x a week. He has been on the albuterol for the last 2 years.He notices intermittent wheezing. He denies any cough, SOB at rest, allergies, or GERD. HE had some CP from his bronchoscopy - still a little sore, but much improved from previous. He has been having LE swelling. He had COVID in 10/ 2021 - went to PCP at [facility]. He had infection on his foot - happened to find out he was positive from this visit. He states his swelling has been going on intermittently for a number of years.      Previous pulmonary history: He has no history of recurrent infections, or lung disease as a child. He had no previous lung hx, never on oxygen or inhaler therapy.      Inhalers/nebulized medications: PRN albuterol      Hospitalization History: He has not been hospitalized over the last year for breathing related problem.     Sleep history: Denies snoring, apnea, feeling tired during the day or taking naps during the day. He will doze a times.        ALLERGIES AND MEDICATIONS     ALLERGIES  No Known Allergies    MEDICATIONS  Current Outpatient Medications   Medication Sig Dispense Refill    albuterol 2.5 mg /3 mL (0.083 %) nebulizer solution TAKE 3 ML (2.5 MG) BY NEBULIZATION 4 TIMES A DAY AS NEEDED FOR WHEEZING OR SHORTNESS OF BREATH. 75 mL 3    amLODIPine (Norvasc) 10 mg tablet Take 1 tablet (10 mg) by mouth once daily.      aspirin 81 mg EC tablet Take 1 tablet (81 mg) by mouth once daily.       Bevespi Aerosphere 9-4.8 mcg HFA aerosol inhaler INHALE 2 PUFFS 2 TIMES A DAY. 10.7 g 3    finasteride (Proscar) 5 mg tablet Take 1 tablet (5 mg) by mouth once daily.      tamsulosin (Flomax) 0.4 mg 24 hr capsule Take 1 capsule (0.4 mg) by mouth 2 times a day. 30 minutes after the same meal each day.      atorvastatin (Lipitor) 10 mg tablet Atorvastatin Calcium 10 MG Oral Tablet   Quantity: 30  Refills: 0        Start : 15-Dec-2014   Active (Patient not taking: Reported on 1/23/2025)      chlorhexidine (Peridex) 0.12 % solution Use 15 mL in the mouth or throat see administration instructions for 2 doses. Use the night before and morning of surgery. (Patient not taking: Reported on 1/23/2025) 473 mL 0    docusate sodium (Colace) 100 mg capsule Take 1 capsule (100 mg) by mouth 2 times a day. (Patient not taking: Reported on 1/23/2025) 15 capsule 0    fluocinonide (Lidex) 0.05 % ointment Fluocinonide 0.05 % External Ointment   Quantity: 60  Refills: 0        Start : 1-Oct-2014   Active (Patient not taking: Reported on 1/23/2025)      nystatin (Mycostatin) cream Nystatin 339156 UNIT/GM External Cream   Quantity: 120  Refills: 0        Start : 26-Sep-2016   Active (Patient not taking: Reported on 1/23/2025)      oxyCODONE (Roxicodone) 5 mg immediate release tablet Take 1 tablet (5 mg) by mouth every 6 hours if needed for severe pain (7 - 10). (Patient not taking: Reported on 1/23/2025) 10 tablet 0    umeclidinium-vilanteroL (Anoro Ellipta) 62.5-25 mcg/actuation blister with device INHALE 1 PUFF ONCE DAILY. (Patient not taking: Reported on 1/23/2025) 60 each 5     No current facility-administered medications for this visit.         PAST HISTORY     PAST MEDICAL HISTORY  - HTN   - BPH   - lung cancer - adenocarcinoma - s/p SBRT 8/2023  -9/2023     PAST SURGICAL HISTORY  Past Surgical History:   Procedure Laterality Date    BRONCHOSCOPY      Bronchoscopy with biopsy showed adenocarcinoma.    COLONOSCOPY      CT ANGIO NECK   05/18/2017    CT NECK ANGIO W AND WO IV CONTRAST 5/18/2017 [facility]    CT HEAD ANGIO W AND WO IV CONTRAST  05/18/2017    CT HEAD ANGIO W AND WO IV CONTRAST 5/18/2017 [facility]       IMMUNIZATION HISTORY  Immunization History   Administered Date(s) Administered    Flu vaccine, trivalent, preservative free, age 6 months and greater (Fluarix/Fluzone/Flulaval) 08/01/2015    Meningococcal ACWY-D (Menactra) 4-valent conjugate vaccine 08/01/2015    Moderna SARS-CoV-2 Vaccination 04/14/2021, 05/12/2021       SOCIAL HISTORY  smoking: [age] 5-10 cigarettes per day - quit last month ~50 pack years   drinking: none  illicit drug use: none       OCCUPATIONAL/ENVIRONMENTAL HISTORY  Occupation: Retired - 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     RESULTS/DATA     Pulmonary Function Test Results   PFTs: 7/13/23 - FEV1/FVC 0.57/ FEV1 1.06 (43%)/ FVC 1.84 (56%)/ DLCO 29%         Chest Radiograph     XR chest 2 view 06/09/2023  1. Redemonstration of 4 cm ovoid mass in the right upper lobe.  2. Minimal bibasilar atelectasis.      Chest CT Scan   CT chest:   - 5/16/23 - IMPRESSION: No evidence of pulmonary embolism. Right upper lobe pulmonary mass measuring 3.7 x 2.7 cm is highly suspicious for malignancy. Adjacent patchy airspace disease is demonstrated in the right upper lobe. Small nodular density is also identified in the left upper lobe measuring 6 mm. Small low-attenuation lesions are identified in the visualized left hepatic lobe. Findings may represent cysts; however, metastatic lesions are not entirely excluded.   - 6/10/23 - Stable appearance of heterogeneous mass in the anterior segment of the right upper lobe, consistent with known lung malignancy. There is adjacent irregular interlobular thickening extending to the subpleural and sub-fissural planes, concerning for a component of lymphangitis. An  additional stable punctate nodular opacity is indeterminate, however suspicious for a satellite nodule, in the setting of the additional changes as above. 2. Stable appearance of enlarged and prominent mediastinal lymph nodes, suspicious for glenis involvement. ABDOMEN-PELVIS: 1. No evidence of metastatic involvement in the abdomen and pelvis. 2. Cholelithiasis. 3. Punctate sclerotic focus in the body of L3, favored to represent a bony island, however attention on follow-up is recommended. 4. Additional findings as above   - 12/14/23- Interval decreased size of a right upper lobe pulmonary lesion with spiculated margins now measuring 2.6 x 1.5 cm. There are adjacent postradiation changes. 2. No evidence of new lesion throughout the bilateral lungs. 3. No significant mediastinal or hilar lymphadenopathy.4. Background of moderate emphysematous changes of the bilateral lungs. 5. Additional findings as above.    - 4/18/24 - Continued interval decrease in size of right upper lobe pulmonary  mass as described above.  2. Stable small pulmonary nodules as described above with no new  pulmonary nodules appreciated.  3. Moderate upper lung predominant emphysema.     10/24/24 - Interval increase in spiculated area of masslike consolidation in  the right upper lobe which now measures 3.1 x 1.5 cm as well as  additional increased areas of interlobular septal thickening and  linear parenchymal opacity. There is also interval appearance of a  1.1 cm nodule within the right upper lobe which may be associated  with the linear parenchymal opacities. While these findings may  represent evolving postradiation changes/fibrosis, local disease  recurrence is not excluded and continued close attention on follow-up  imaging is recommended.  2. Severe upper lung predominant emphysema.  3. Additional chronic and incidental findings as above.    1/23/25 -     PET: 7/12/23: Hypermetabolic anterior right upper lobe mass consistent  with  biopsy-proven malignancy.  2. Nonspecific mildly hypermetabolic mediastinal and bilateral hilar  lymph nodes which may be reactive in nature, however metastatic  disease can not be definitely excluded.  3. Hypermetabolic focus in the region of the right parotid gland  which is favored to represent a benign process such as a Warthin's  tumor with a metastatic lymph node felt to be less likely.     Echocardiogram     Echo: 7/13/23: EF 60-65%, RA normal size, RV normal size and function.    11/15/24 -   Left ventricular ejection fraction is normal, by visual estimate at 65-70%.   2. There is normal right ventricular global systolic function.   3. No significant valvular heart disease.   4. No pericardial effusion.   RA normal size, RV normal size and function         Labs/ Other testing      Lung biopsy - 6/7/23 - LUNG, RIGHT UPPER LOBE NODULE, BIOPSY: --INVASIVE POORLY DIFFERENTIATED ADENOCARCINOMA ADENOCARCINOMA. SEE NOTE. --SEE CONCURRENT CYTOLOGY SPECIMEN [id number].   B. FINE NEEDLE ASPIRATION 7 LYMPH NODE, CYTOLOGY AND CELL BLOCK: NO MALIGNANT CELLS IDENTIFIED. LYMPHOID SAMPLE.    REVIEW OF SYSTEMS     REVIEW OF SYSTEMS  Review of Systems   Constitutional:  Negative for fatigue and fever.   HENT:  Negative for congestion, postnasal drip, rhinorrhea, sinus pressure and voice change.    Eyes:  Negative for pain and visual disturbance.   Cardiovascular:  Negative for chest pain, palpitations and leg swelling.   Gastrointestinal:  Negative for abdominal pain, diarrhea, nausea and vomiting.   Endocrine: Negative for cold intolerance and heat intolerance.   Musculoskeletal:  Negative for arthralgias, back pain, joint swelling and myalgias.   Skin:  Negative for rash.   Neurological:  Negative for dizziness, weakness, numbness and headaches.   Psychiatric/Behavioral:  Negative for agitation. The patient is not nervous/anxious.          PHYSICAL EXAM     VITAL SIGNS: /87   Pulse 60   Temp 36.6 °C (97.9 °F)    Wt 87.1 kg (192 lb)   SpO2 94% Comment: RA  BMI 27.55 kg/m²      CURRENT WEIGHT: [unfilled]  BMI: [unfilled]  PREVIOUS WEIGHTS:  Wt Readings from Last 3 Encounters:   01/23/25 87.1 kg (192 lb)   01/23/25 87.2 kg (192 lb 4.8 oz)   10/24/24 84.1 kg (185 lb 8 oz)       Physical Exam  Vitals reviewed.   Constitutional:       General: She is not in acute distress.     Appearance: Normal appearance. She is not ill-appearing or toxic-appearing.   HENT:      Head: Normocephalic.      Nose: No rhinorrhea.   Cardiovascular:      Rate and Rhythm: Normal rate and regular rhythm.      Heart sounds: Normal heart sounds.   Pulmonary:      Effort: Pulmonary effort is normal. No respiratory distress.      Breath sounds: Normal breath sounds. No stridor. No wheezing, rhonchi or rales.   Abdominal:      General: Abdomen is flat.   Musculoskeletal:         General: Normal range of motion.      Right lower leg: No edema.      Left lower leg: No edema.   Skin:     General: Skin is warm and dry.      Nails: There is no clubbing.   Neurological:      General: No focal deficit present.      Mental Status: She is alert and oriented to person, place, and time.   Psychiatric:         Mood and Affect: Mood normal.         Behavior: Behavior normal.         Judgment: Judgment normal.         ASSESSMENT/PLAN       1. Lung cancer: adenocarcinoma - 7LN biopsy negative for malignancy- s/p SBRT 2023  - continue to follow with  Rad/ Onc      2. COPD: PFTs with severe obstruction. Echo with normal EF.   - stop anoro   - start breztri 2 puffs twice daily - rinse mouth out afterwards   - continue albuterol HFA 2 puffs or albuterol nebulizers every 4-6 hours as needed   - will get updated PFTs - call [phone] to schedule with next appt   - check pulse ox - if dropping <88% with walking - if     Thank you for visiting the Pulmonary clinic today!   Return to clinic  6 months with Pfts  or sooner if needed   Gabbie Sutton CNP  My office -  [phone]- Jessica is my . Radha is my nurse [phone].   Radiology scheduling [phone]   Appointment scheduling [phone]   Pulmonary function testing - [phone]

## 2025-01-30 ENCOUNTER — HOSPITAL ENCOUNTER (OUTPATIENT)
Dept: RADIOLOGY | Facility: HOSPITAL | Age: 80
Discharge: HOME | End: 2025-01-30
Payer: MEDICARE

## 2025-01-30 DIAGNOSIS — C34.91 NON-SMALL CELL CANCER OF RIGHT LUNG (MULTI): ICD-10-CM

## 2025-01-30 LAB — GLUCOSE BLD MANUAL STRIP-MCNC: 87 MG/DL (ref 74–99)

## 2025-01-30 PROCEDURE — 78815 PET IMAGE W/CT SKULL-THIGH: CPT

## 2025-01-30 PROCEDURE — 3430000001 HC RX 343 DIAGNOSTIC RADIOPHARMACEUTICALS: Performed by: NURSE PRACTITIONER

## 2025-01-30 PROCEDURE — 82947 ASSAY GLUCOSE BLOOD QUANT: CPT

## 2025-01-30 PROCEDURE — A9552 F18 FDG: HCPCS | Performed by: NURSE PRACTITIONER

## 2025-01-30 RX ORDER — FLUDEOXYGLUCOSE F 18 200 MCI/ML
12.22 INJECTION, SOLUTION INTRAVENOUS
Status: COMPLETED | OUTPATIENT
Start: 2025-01-30 | End: 2025-01-30

## 2025-01-30 RX ADMIN — FLUDEOXYGLUCOSE F 18 12.22 MILLICURIE: 200 INJECTION, SOLUTION INTRAVENOUS at 07:38

## 2025-02-03 ENCOUNTER — TELEPHONE (OUTPATIENT)
Dept: RADIATION ONCOLOGY | Facility: HOSPITAL | Age: 80
End: 2025-02-03
Payer: MEDICARE

## 2025-02-03 DIAGNOSIS — R91.8 LUNG NODULES: ICD-10-CM

## 2025-02-03 DIAGNOSIS — C34.91 NON-SMALL CELL CANCER OF RIGHT LUNG (MULTI): Primary | ICD-10-CM

## 2025-02-03 DIAGNOSIS — Z01.818 PRE-OP TESTING: ICD-10-CM

## 2025-02-03 NOTE — TELEPHONE ENCOUNTER
Attempted to call patients number. No answer and voicemail not set up. Called spouse's number. Discussed results of recent PET CT. Submitted to interventional pulmonology. Patient to be scheduled for biopsy. Will call with results and plan.     NM PET CT lung CA staging 01/30/2025    Narrative  Interpreted By:  Mariah Mazariegos and Meyers Emily  STUDY:  NM PET CT LUNG CA STAGING; 1/30/2025 10:00 am    INDICATION:  Signs/Symptoms:histor of lung cancer s/p SBRT 9/11/23.Interval  slightly increased size of a masslike consolidation with spiculated  margins in the right upper lobe on CT from 1/23/25.    Per medical record: 79-year-old male with history of biopsy-proven  lung adenocarcinoma. CT chest without contrast dated 01/23/2025 with  evidence of increased size of a masslike consolidation with  spiculated margins in the right upper lobe. Concern for local disease  recurrence.    COMPARISON:  CT chest without contrast 01/23/2025, PET-CT 07/20/2023    ACCESSION NUMBER(S):  [id number]    ORDERING CLINICIAN:  REJI LEDESMA    TECHNIQUE:  DIVISION OF NUCLEAR MEDICINE  POSITRON EMISSION TOMOGRAPHY (PET-CT)    The patient received an intravenous dose of 12.2 mCi of Fluorine-18  fluorodeoxyglucose (FDG). Positron emission tomographic (PET) images  from mid-thigh to skull base were then acquired after a one hour  delay. Also acquired was a contemporaneous low dose non-contrast CT  scan performed for attenuation correction of PET images and anatomic  localization.  The PET and CT images were digitally fused for  display.  All images were acquired on a combined PET-CT scanner unit.  Some areas of FDG accumulation may be described in standardized  uptake value (SUV) units.    CODING:  Subsequent Treatment Strategy (PS)    CALIBRATION:  Dose Injection-to-Scan Interval (mins): 53 min  Mediastinal bloodpool SUV (normal 1.5-2.5): 2.2  Blood glucose: 87 mg/dL    FINDINGS:  NECK:  *Interval metabolic resolution of previously noted FDG  avid focus in  the right parotid gland. *Unremarkable thyroid gland.  *Grossly symmetric FDG uptake in bilateral palatine tonsils as well  as mildly FDG avid right cervical level II and IV nodes with SUV max  of 2.9, likely reactive.    CHEST:  *A FDG avid lesion along the anterior aspect of right upper lobe with  a SUV max of 7.6 versus 16 previously. Mildly FDG avid opacities in  the right upper lobe adjacent to the aforementioned mass, with SUV  max 2.1. Left lung is unremarkable. *Persistent FDG avid right  paratracheal node with SUV max 2.9. *Mild right-sided gynecomastia.    ABDOMEN AND PELVIS:  *No FDG avid lymphadenopathy.  *Physiologic radiotracer uptake is present in the liver and spleen  with excretion into the bowel loops and the genitourinary  tract.Cholelithiasis without evidence acute cholecystitis. Left  adrenal cyst. *Unremarkable bilateral adrenal glands    MUSCULOSKELETAL:  *No concerning FDG avid bone lesion throughout the axial and  appendicular skeleton to suggest osseous metastasis.    Impression  1. A FDG avid lesion along the anterior aspect of right upper lobe,  likely represent residual disease versus local recurrence.  Mildly  FDG avid opacities in the right upper lobe adjacent to the  aforementioned mass, likely representing post radiation changes.  2. 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  3. No PET evidence of distant metastasis.    I personally reviewed the image(s) / study and agree with the  findings and interpretation as stated. This study was interpreted at  [facility].    Signed by: Mariah Mazariegos 2/1/2025 7:46 PM  Dictation workstation:   [id number]

## 2025-02-03 NOTE — PROGRESS NOTES
Bronchoscopy Scheduling Request    Pre-bronchoscopy visit: Follow-up visit with Bronchoscopy group provider Has seen NP Gabbie Sutton in past  Please schedule procedure: Next available    Cytology on-site:  Yes  Location:  [facility]  Performing physician:  Advanced diagnostic bronchoscopist  Referring physician:  Deep Lee MD, Loree Munoz MD  Indication:  RUL nodules  Sedation / Anesthesia:  GA  Procedure:  Airway exam, TBNA, TBBx, Navigational bronchoscopy, Radial EBUS, Dx EBUS, Staging EBUS  Time:  Tier 3  Fluorscopy:   Yes  Imaging needed:  CT Robert - same day as procedure  Labs:  CBC, BMP  Meds:  None  Special Considerations:   History of RUL NSCLC (adeno), changes on imaging-  request is for biopsy of 2 RUL nodular areas (areas circles in red on email communications, see below), and EBUS given PET concerns of LAD  . Has had bronh in past.   Reviewed by:  Sue Soriano MD

## 2025-02-10 ENCOUNTER — HOSPITAL ENCOUNTER (OUTPATIENT)
Dept: RESPIRATORY THERAPY | Facility: HOSPITAL | Age: 80
Discharge: HOME | End: 2025-02-10
Payer: MEDICARE

## 2025-02-10 DIAGNOSIS — J44.9 CHRONIC OBSTRUCTIVE PULMONARY DISEASE, UNSPECIFIED COPD TYPE (MULTI): ICD-10-CM

## 2025-02-10 PROCEDURE — 94060 EVALUATION OF WHEEZING: CPT | Performed by: INTERNAL MEDICINE

## 2025-02-10 PROCEDURE — 2500000001 HC RX 250 WO HCPCS SELF ADMINISTERED DRUGS (ALT 637 FOR MEDICARE OP): Performed by: NURSE PRACTITIONER

## 2025-02-10 PROCEDURE — 94726 PLETHYSMOGRAPHY LUNG VOLUMES: CPT | Performed by: INTERNAL MEDICINE

## 2025-02-10 PROCEDURE — 94618 PULMONARY STRESS TESTING: CPT | Performed by: INTERNAL MEDICINE

## 2025-02-10 PROCEDURE — 94618 PULMONARY STRESS TESTING: CPT

## 2025-02-10 RX ORDER — ALBUTEROL SULFATE 0.83 MG/ML
3 SOLUTION RESPIRATORY (INHALATION) ONCE
Status: COMPLETED | OUTPATIENT
Start: 2025-02-10 | End: 2025-02-10

## 2025-02-10 RX ORDER — ALBUTEROL SULFATE 90 UG/1
1 INHALANT RESPIRATORY (INHALATION) ONCE
Status: COMPLETED | OUTPATIENT
Start: 2025-02-10 | End: 2025-02-10

## 2025-02-10 RX ADMIN — ALBUTEROL SULFATE 4 PUFF: 90 AEROSOL, METERED RESPIRATORY (INHALATION) at 13:39

## 2025-02-14 LAB
MGC ASCENT PFT - FEV1 - POST: 1.27
MGC ASCENT PFT - FEV1 - PRE: 1.34
MGC ASCENT PFT - FEV1 - PREDICTED: 2.77
MGC ASCENT PFT - FVC - POST: 1.93
MGC ASCENT PFT - FVC - PRE: 2.13
MGC ASCENT PFT - FVC - PREDICTED: 3.78

## 2025-02-18 ENCOUNTER — ANESTHESIA (OUTPATIENT)
Dept: GASTROENTEROLOGY | Facility: HOSPITAL | Age: 80
End: 2025-02-18
Payer: MEDICARE

## 2025-02-18 ENCOUNTER — HOSPITAL ENCOUNTER (OUTPATIENT)
Dept: RADIOLOGY | Facility: HOSPITAL | Age: 80
Discharge: HOME | End: 2025-02-18
Payer: MEDICARE

## 2025-02-18 ENCOUNTER — ANESTHESIA EVENT (OUTPATIENT)
Dept: GASTROENTEROLOGY | Facility: HOSPITAL | Age: 80
End: 2025-02-18
Payer: MEDICARE

## 2025-02-18 ENCOUNTER — HOSPITAL ENCOUNTER (OUTPATIENT)
Dept: GASTROENTEROLOGY | Facility: HOSPITAL | Age: 80
Discharge: HOME | End: 2025-02-18
Payer: MEDICARE

## 2025-02-18 ENCOUNTER — LAB (OUTPATIENT)
Dept: LAB | Facility: HOSPITAL | Age: 80
End: 2025-02-18
Payer: MEDICARE

## 2025-02-18 VITALS
TEMPERATURE: 96.8 F | SYSTOLIC BLOOD PRESSURE: 150 MMHG | HEART RATE: 90 BPM | WEIGHT: 189 LBS | DIASTOLIC BLOOD PRESSURE: 91 MMHG | HEIGHT: 70 IN | RESPIRATION RATE: 18 BRPM | OXYGEN SATURATION: 91 % | BODY MASS INDEX: 27.06 KG/M2

## 2025-02-18 DIAGNOSIS — C34.91 MALIGNANT NEOPLASM OF UNSPECIFIED PART OF RIGHT BRONCHUS OR LUNG (MULTI): ICD-10-CM

## 2025-02-18 DIAGNOSIS — R91.8 LUNG NODULES: ICD-10-CM

## 2025-02-18 DIAGNOSIS — C34.91 NON-SMALL CELL CANCER OF RIGHT LUNG (MULTI): ICD-10-CM

## 2025-02-18 LAB
ANION GAP SERPL CALC-SCNC: 14 MMOL/L (ref 10–20)
BUN SERPL-MCNC: 11 MG/DL (ref 6–23)
CALCIUM SERPL-MCNC: 9.3 MG/DL (ref 8.6–10.3)
CHLORIDE SERPL-SCNC: 105 MMOL/L (ref 98–107)
CO2 SERPL-SCNC: 25 MMOL/L (ref 21–32)
CREAT SERPL-MCNC: 0.9 MG/DL (ref 0.5–1.3)
EGFRCR SERPLBLD CKD-EPI 2021: 65 ML/MIN/1.73M*2
ERYTHROCYTE [DISTWIDTH] IN BLOOD BY AUTOMATED COUNT: 16.1 % (ref 11.5–14.5)
GLUCOSE SERPL-MCNC: 84 MG/DL (ref 74–99)
HCT VFR BLD AUTO: 41 % (ref 36–52)
HGB BLD-MCNC: 13.2 G/DL (ref 12–17.5)
MCH RBC QN AUTO: 25.3 PG (ref 26–34)
MCHC RBC AUTO-ENTMCNC: 32.2 G/DL (ref 32–36)
MCV RBC AUTO: 79 FL (ref 80–100)
NRBC BLD-RTO: 0 /100 WBCS (ref 0–0)
PLATELET # BLD AUTO: 203 X10*3/UL (ref 150–450)
POTASSIUM SERPL-SCNC: 3.9 MMOL/L (ref 3.5–5.3)
RBC # BLD AUTO: 5.21 X10*6/UL (ref 4–5.9)
SODIUM SERPL-SCNC: 140 MMOL/L (ref 136–145)
WBC # BLD AUTO: 4.2 X10*3/UL (ref 4.4–11.3)

## 2025-02-18 PROCEDURE — 80048 BASIC METABOLIC PNL TOTAL CA: CPT | Performed by: INTERNAL MEDICINE

## 2025-02-18 PROCEDURE — 31654 BRONCH EBUS IVNTJ PERPH LES: CPT | Performed by: STUDENT IN AN ORGANIZED HEALTH CARE EDUCATION/TRAINING PROGRAM

## 2025-02-18 PROCEDURE — 2720000007 HC OR 272 NO HCPCS

## 2025-02-18 PROCEDURE — 31629 BRONCHOSCOPY/NEEDLE BX EACH: CPT | Performed by: STUDENT IN AN ORGANIZED HEALTH CARE EDUCATION/TRAINING PROGRAM

## 2025-02-18 PROCEDURE — 85027 COMPLETE CBC AUTOMATED: CPT | Performed by: INTERNAL MEDICINE

## 2025-02-18 PROCEDURE — 31641 BRONCHOSCOPY TREAT BLOCKAGE: CPT | Performed by: STUDENT IN AN ORGANIZED HEALTH CARE EDUCATION/TRAINING PROGRAM

## 2025-02-18 PROCEDURE — 31627 NAVIGATIONAL BRONCHOSCOPY: CPT | Performed by: STUDENT IN AN ORGANIZED HEALTH CARE EDUCATION/TRAINING PROGRAM

## 2025-02-18 PROCEDURE — 99100 ANES PT EXTEME AGE<1 YR&>70: CPT | Performed by: STUDENT IN AN ORGANIZED HEALTH CARE EDUCATION/TRAINING PROGRAM

## 2025-02-18 PROCEDURE — 31628 BRONCHOSCOPY/LUNG BX EACH: CPT | Performed by: STUDENT IN AN ORGANIZED HEALTH CARE EDUCATION/TRAINING PROGRAM

## 2025-02-18 PROCEDURE — A31653 PR BRNCHSC EBUS GUIDED SAMPL 3/> NODE STATION/STRUX: Performed by: STUDENT IN AN ORGANIZED HEALTH CARE EDUCATION/TRAINING PROGRAM

## 2025-02-18 PROCEDURE — 31653 BRONCH EBUS SAMPLNG 3/> NODE: CPT | Performed by: STUDENT IN AN ORGANIZED HEALTH CARE EDUCATION/TRAINING PROGRAM

## 2025-02-18 PROCEDURE — 7100000009 HC PHASE TWO TIME - INITIAL BASE CHARGE

## 2025-02-18 PROCEDURE — P9045 ALBUMIN (HUMAN), 5%, 250 ML: HCPCS | Mod: JZ,TB | Performed by: ANESTHESIOLOGIST ASSISTANT

## 2025-02-18 PROCEDURE — 7100000002 HC RECOVERY ROOM TIME - EACH INCREMENTAL 1 MINUTE

## 2025-02-18 PROCEDURE — 3700000002 HC GENERAL ANESTHESIA TIME - EACH INCREMENTAL 1 MINUTE

## 2025-02-18 PROCEDURE — 71045 X-RAY EXAM CHEST 1 VIEW: CPT

## 2025-02-18 PROCEDURE — 71045 X-RAY EXAM CHEST 1 VIEW: CPT | Performed by: RADIOLOGY

## 2025-02-18 PROCEDURE — 2500000004 HC RX 250 GENERAL PHARMACY W/ HCPCS (ALT 636 FOR OP/ED): Performed by: ANESTHESIOLOGIST ASSISTANT

## 2025-02-18 PROCEDURE — C1601 HC OR 272 NO HCPCS: HCPCS

## 2025-02-18 PROCEDURE — 31623 DX BRONCHOSCOPE/BRUSH: CPT | Performed by: STUDENT IN AN ORGANIZED HEALTH CARE EDUCATION/TRAINING PROGRAM

## 2025-02-18 PROCEDURE — 71250 CT THORAX DX C-: CPT

## 2025-02-18 PROCEDURE — 7100000010 HC PHASE TWO TIME - EACH INCREMENTAL 1 MINUTE

## 2025-02-18 PROCEDURE — 31622 DX BRONCHOSCOPE/WASH: CPT | Performed by: STUDENT IN AN ORGANIZED HEALTH CARE EDUCATION/TRAINING PROGRAM

## 2025-02-18 PROCEDURE — 3700000001 HC GENERAL ANESTHESIA TIME - INITIAL BASE CHARGE

## 2025-02-18 PROCEDURE — 7100000001 HC RECOVERY ROOM TIME - INITIAL BASE CHARGE

## 2025-02-18 PROCEDURE — A31653 PR BRNCHSC EBUS GUIDED SAMPL 3/> NODE STATION/STRUX: Performed by: ANESTHESIOLOGIST ASSISTANT

## 2025-02-18 RX ORDER — ALBUMIN HUMAN 50 G/1000ML
SOLUTION INTRAVENOUS AS NEEDED
Status: DISCONTINUED | OUTPATIENT
Start: 2025-02-18 | End: 2025-02-18

## 2025-02-18 RX ORDER — ONDANSETRON HYDROCHLORIDE 2 MG/ML
4 INJECTION, SOLUTION INTRAVENOUS ONCE AS NEEDED
OUTPATIENT
Start: 2025-02-18

## 2025-02-18 RX ORDER — PHENYLEPHRINE 10 MG/250 ML(40 MCG/ML)IN 0.9 % SOD.CHLORIDE INTRAVENOUS
CONTINUOUS PRN
Status: DISCONTINUED | OUTPATIENT
Start: 2025-02-18 | End: 2025-02-18

## 2025-02-18 RX ORDER — PHENYLEPHRINE HCL IN 0.9% NACL 0.4MG/10ML
SYRINGE (ML) INTRAVENOUS AS NEEDED
Status: DISCONTINUED | OUTPATIENT
Start: 2025-02-18 | End: 2025-02-18

## 2025-02-18 RX ORDER — LIDOCAINE HYDROCHLORIDE 20 MG/ML
INJECTION, SOLUTION INFILTRATION; PERINEURAL AS NEEDED
Status: DISCONTINUED | OUTPATIENT
Start: 2025-02-18 | End: 2025-02-18

## 2025-02-18 RX ORDER — FENTANYL CITRATE 50 UG/ML
INJECTION, SOLUTION INTRAMUSCULAR; INTRAVENOUS AS NEEDED
Status: DISCONTINUED | OUTPATIENT
Start: 2025-02-18 | End: 2025-02-18

## 2025-02-18 RX ORDER — DROPERIDOL 2.5 MG/ML
0.62 INJECTION, SOLUTION INTRAMUSCULAR; INTRAVENOUS ONCE AS NEEDED
OUTPATIENT
Start: 2025-02-18

## 2025-02-18 RX ORDER — ROCURONIUM BROMIDE 10 MG/ML
INJECTION, SOLUTION INTRAVENOUS AS NEEDED
Status: DISCONTINUED | OUTPATIENT
Start: 2025-02-18 | End: 2025-02-18

## 2025-02-18 RX ORDER — PROCHLORPERAZINE EDISYLATE 5 MG/ML
5 INJECTION INTRAMUSCULAR; INTRAVENOUS ONCE AS NEEDED
OUTPATIENT
Start: 2025-02-18

## 2025-02-18 RX ORDER — PROPOFOL 10 MG/ML
INJECTION, EMULSION INTRAVENOUS CONTINUOUS PRN
Status: DISCONTINUED | OUTPATIENT
Start: 2025-02-18 | End: 2025-02-18

## 2025-02-18 RX ADMIN — DEXAMETHASONE SODIUM PHOSPHATE 10 MG: 4 INJECTION INTRA-ARTICULAR; INTRALESIONAL; INTRAMUSCULAR; INTRAVENOUS; SOFT TISSUE at 10:54

## 2025-02-18 RX ADMIN — ALBUMIN HUMAN 250 ML: 0.05 INJECTION, SOLUTION INTRAVENOUS at 12:45

## 2025-02-18 RX ADMIN — PROPOFOL 125 MCG/KG/MIN: 10 INJECTION, EMULSION INTRAVENOUS at 11:00

## 2025-02-18 RX ADMIN — Medication 120 MCG: at 11:36

## 2025-02-18 RX ADMIN — ROCURONIUM 100 MG: 50 INJECTION, SOLUTION INTRAVENOUS at 10:54

## 2025-02-18 RX ADMIN — PROPOFOL 200 MCG/KG/MIN: 10 INJECTION, EMULSION INTRAVENOUS at 10:55

## 2025-02-18 RX ADMIN — PROPOFOL 40 MG: 10 INJECTION, EMULSION INTRAVENOUS at 13:48

## 2025-02-18 RX ADMIN — PROPOFOL 15 MG: 10 INJECTION, EMULSION INTRAVENOUS at 13:58

## 2025-02-18 RX ADMIN — Medication 120 MCG: at 12:38

## 2025-02-18 RX ADMIN — PROPOFOL 30 MG: 10 INJECTION, EMULSION INTRAVENOUS at 13:35

## 2025-02-18 RX ADMIN — LIDOCAINE HYDROCHLORIDE 100 MG: 20 INJECTION, SOLUTION INFILTRATION; PERINEURAL at 10:54

## 2025-02-18 RX ADMIN — FENTANYL CITRATE 50 MCG: 50 INJECTION, SOLUTION INTRAMUSCULAR; INTRAVENOUS at 10:54

## 2025-02-18 RX ADMIN — SUGAMMADEX 200 MG: 100 INJECTION, SOLUTION INTRAVENOUS at 14:06

## 2025-02-18 RX ADMIN — SODIUM CHLORIDE, SODIUM LACTATE, POTASSIUM CHLORIDE, AND CALCIUM CHLORIDE: 600; 310; 30; 20 INJECTION, SOLUTION INTRAVENOUS at 10:49

## 2025-02-18 RX ADMIN — Medication 160 MCG: at 11:33

## 2025-02-18 RX ADMIN — ROCURONIUM 10 MG: 50 INJECTION, SOLUTION INTRAVENOUS at 12:45

## 2025-02-18 RX ADMIN — PHENYLEPHRINE-NACL IV SOLUTION 10 MG/250ML-0.9% 0.3 MCG/KG/MIN: 10-0.9/25 SOLUTION at 11:22

## 2025-02-18 RX ADMIN — FENTANYL CITRATE 50 MCG: 50 INJECTION, SOLUTION INTRAMUSCULAR; INTRAVENOUS at 12:08

## 2025-02-18 RX ADMIN — PROPOFOL 40 MG: 10 INJECTION, EMULSION INTRAVENOUS at 13:43

## 2025-02-18 RX ADMIN — Medication 160 MCG: at 11:06

## 2025-02-18 RX ADMIN — PROPOFOL 120 MG: 10 INJECTION, EMULSION INTRAVENOUS at 10:54

## 2025-02-18 RX ADMIN — Medication 120 MCG: at 11:55

## 2025-02-18 RX ADMIN — PROPOFOL 100 MCG/KG/MIN: 10 INJECTION, EMULSION INTRAVENOUS at 11:10

## 2025-02-18 RX ADMIN — Medication 160 MCG: at 11:19

## 2025-02-18 ASSESSMENT — PAIN - FUNCTIONAL ASSESSMENT
PAIN_FUNCTIONAL_ASSESSMENT: 0-10

## 2025-02-18 ASSESSMENT — PAIN SCALES - GENERAL
PAINLEVEL_OUTOF10: 0 - NO PAIN
PAINLEVEL_OUTOF10: 5 - MODERATE PAIN

## 2025-02-18 ASSESSMENT — COLUMBIA-SUICIDE SEVERITY RATING SCALE - C-SSRS: 1. IN THE PAST MONTH, HAVE YOU WISHED YOU WERE DEAD OR WISHED YOU COULD GO TO SLEEP AND NOT WAKE UP?: NO

## 2025-02-18 ASSESSMENT — PAIN DESCRIPTION - DESCRIPTORS: DESCRIPTORS: PRESSURE

## 2025-02-18 NOTE — DISCHARGE INSTRUCTIONS
[facility] Pulmonology    The anesthetics, sedatives or narcotics which were given to you today will be acting in your body for the next 24 hours, so you might feel a little sleepy or groggy. This feeling should slowly wear off.   Carefully read and follow the instructions below:   You received sedation today.   Do not drive or operate machinery or power tools of any kind.   No alcoholic beverages today, not even beer or wine.   No over the counter medications that contain alcohol or may cause drowsiness.   Do not make important decisions or sign legal documents.     Do not use Aspirin containing products or non-steroidal medications for the next 24 hours.  (Examples of these types of medications include: Advil, Aleve, Ecotrin, Ibuprofen, Motrin or Naprosyn.  This list is not all-inclusive.  Check with your physician or pharmacist before resuming these medications.)  Tylenol, cough medicine, cough drops or throat lozenges may be used when you are allowed to resume eating and drinking.     Call your physician if any of these symptoms occur:   High fever over 101 degrees or chills (a low grade fever is common for 24 hours)   Rash or hives   Persistent nausea or vomiting   Inability to urinate within 8 hours after the procedure  Go directly to the emergency room if you notice any of the following:   Shortness of breath   Chest pain  Coughing up large amounts of bright red blood greater than a teaspoonful of blood clots (about a teaspoonful for the next 24-48 hours is normal, especially if you had a biopsy)  Resume all normal medications unless directed otherwise by your doctor.     Your doctor recommends these additional instructions:      Follow up with your referring physician as previously scheduled.    If you experience any problems or have any questions following discharge, please call:   Before 5 pm: [phone]   After 5pm and on weekends: [phone] / [phone] and ask for  the Pulmonary Fellow on-call (Pager Number: [phone])

## 2025-02-18 NOTE — ANESTHESIA PREPROCEDURE EVALUATION
Patient: Nadine Smith    Procedure Information       Date/Time: 02/18/25 1130    Scheduled providers: Fatimah Muhammad MD    Procedure: BRONCHOSCOPY    Location: [facility]          ALLERGIES:  No Known Allergies     MEDICAL HISTORY:  Past Medical History:   Diagnosis Date    Bilateral inguinal hernia     R>L    BPH (benign prostatic hyperplasia)     Cataract     COPD (chronic obstructive pulmonary disease) (Multi)     PFTs with severe obstruction. Echo with normal EF.  Continue bevespi and albuterol    COVID-19 10/2021    Disorder of prostate, unspecified     Prostate troubles    Hypertension     NSCLC of right lung (Multi)     s/p SBRT that finished in Sept 2023 (08/2023-09/2023)    Pulmonary nodule     RUL    Snoring     Snoring    TIA (transient ischemic attack)         Relevant Problems   Pulmonary   (+) Chronic obstructive pulmonary disease (Multi)   (+) Lung nodules   (+) Non-small cell lung cancer (Multi)      Neuro   (+) TIA (transient ischemic attack)      HEENT   (+) Asymmetrical sensorineural hearing loss        SURGICAL HISTORY:  Past Surgical History:   Procedure Laterality Date    BRONCHOSCOPY      Bronchoscopy with biopsy showed adenocarcinoma.    COLONOSCOPY      CT ANGIO NECK  05/18/2017    CT NECK ANGIO W AND WO IV CONTRAST 5/18/2017 [facility]    CT HEAD ANGIO W AND WO IV CONTRAST  05/18/2017    CT HEAD ANGIO W AND WO IV CONTRAST 5/18/2017 [facility]GENC[facility]        MEDICATIONS:  Current Outpatient Medications   Medication Instructions    albuterol (Ventolin HFA) 90 mcg/actuation inhaler 2 puffs, inhalation, Every 4 hours PRN    albuterol 2.5 mg, nebulization, 4 times daily PRN    amLODIPine (Norvasc) 10 mg tablet 1 tablet, Daily    aspirin 81 mg EC tablet 1 tablet, Daily    budesonide-glycopyr-formoterol (BREZTRI) 160-9-4.8 mcg/actuation HFA aerosol inhaler 2 puffs, inhalation, 2 times daily    finasteride (Proscar) 5 mg tablet 1 tablet, Daily    tamsulosin  (Flomax) 0.4 mg 24 hr capsule 1 capsule, 2 times daily        VITALS:      1/23/2025    10:12 AM 1/23/2025     9:44 AM 10/24/2024    12:26 PM   Vitals   Systolic 179 167 153   Diastolic 87 97 92   Heart Rate 60 57 88   Temp 36.6 °C (97.9 °F) 36.5 °C (97.7 °F) 36 °C (96.8 °F)   Resp  18 18   Weight (lb) 192 192.3 185.5   BMI 27.55 kg/m2 27.59 kg/m2 26.62 kg/m2   BSA (m2) 2.07 m2 2.08 m2 2.04 m2   Visit Report Report Report        LABS:   BMP   Lab Results   Component Value Date    GLUCOSE 83 10/24/2024    CALCIUM 9.5 10/24/2024     10/24/2024    K 3.7 10/24/2024    CO2 26 10/24/2024     10/24/2024    BUN 16 10/24/2024    CREATININE 0.82 10/24/2024   , LFT   Lab Results   Component Value Date    ALT 8 (L) 06/18/2024    AST 14 06/18/2024    ALKPHOS 60 06/18/2024    BILITOT 1.3 (H) 06/18/2024   , CBC  Lab Results   Component Value Date    WBC 3.2 (L) 06/28/2024    HGB 12.5 (L) 06/28/2024    HCT 38.3 (L) 06/28/2024    MCV 79 (L) 06/28/2024     06/28/2024          , Coags   Lab Results   Component Value Date/Time    PROTIME 11.8 06/18/2024 1551    INR 1.0 06/18/2024 1551    APTT 27 06/18/2024 1551      , A1C   Lab Results   Component Value Date    HGBA1C 5.3 06/28/2024       IMAGES:  EKG        No results found for this or any previous visit (from the past 4464 hours).   , ECHO         Transthoracic Echo (TTE) Complete 11/15/2024    [facility], [address] [place] [address]  Tel [phone] and Fax [phone]    TRANSTHORACIC ECHOCARDIOGRAM REPORT      Patient Name:       JORGE VERGARA        Reading Physician:    [id number] Andres Anderson MD  Study Date:         11/15/2024          Ordering Provider:    [id number] CINDY ROBLEDO  MRN/PID:            [id number]            Fellow:  Accession#:         [id number]        Nurse:  Date of Birth/Age:  1945 / 79      Sonographer:          Rocio seals                                      RDCS  Gender assigned at  M                   Additional Staff:  Birth:  Height:             177.80 cm           Admit Date:  Weight:             83.92 kg            Admission Status:     Outpatient  BSA / BMI:          2.02 m2 / 26.54     Encounter#:           [id number]  kg/m2  Blood Pressure:     188/86 mmHg         Department Location:  [facility]  Non Invasive    Study Type:    TRANSTHORACIC ECHO (TTE) COMPLETE  Diagnosis/ICD: Dyspnea, unspecified-R06.00  Indication:    Dyspnea  CPT Code:      Echo Complete w Full Doppler-[id number]    Patient History:  Pertinent History: HTN, Dyspnea and NSCLC.    Study Detail: The following Echo studies were performed: 2D, M-Mode, Doppler and  color flow. Technically challenging study due to body habitus and  prominent lung artifact.      PHYSICIAN INTERPRETATION:  Left Ventricle: Left ventricular ejection fraction is normal, by visual estimate at 65-70%. There are no regional left ventricular wall motion abnormalities. The left ventricular cavity size is normal. There is normal septal and normal posterior left ventricular wall thickness. There is left ventricular concentric remodeling. Spectral Doppler shows a normal pattern of left ventricular diastolic filling.  Left Atrium: The left atrium is normal in size.  Right Ventricle: The right ventricle is normal in size. There is normal right ventricular global systolic function.  Right Atrium: The right atrium is normal in size.  Aortic Valve: The aortic valve is trileaflet. There is minimal aortic valve cusp calcification. There is no evidence of aortic valve regurgitation. The peak instantaneous gradient of the aortic valve is 14 mmHg.  Mitral Valve: The mitral valve is normal in structure. There is no evidence of mitral valve regurgitation.  Tricuspid Valve: The tricuspid valve is structurally normal. There is trace tricuspid regurgitation. Reported right ventricular systolic pressure may be underestimated due to  incomplete or suboptimal Doppler envelope.  Pulmonic Valve: The pulmonic valve is not well visualized. Pulmonic valve regurgitation was not assessed.  Pericardium: There is no pericardial effusion noted.  Aorta: The aortic root is normal.  In comparison to the previous echocardiogram(s): Compared with study dated 7/13/2023, no significant change.      CONCLUSIONS:  1. Left ventricular ejection fraction is normal, by visual estimate at 65-70%.  2. There is normal right ventricular global systolic function.  3. No significant valvular heart disease.  4. No pericardial effusion.    QUANTITATIVE DATA SUMMARY:    2D MEASUREMENTS:          Normal Ranges:  Ao Root d:       3.40 cm  (2.0-3.7cm)  LAs:             2.70 cm  (2.7-4.0cm)  IVSd:            1.00 cm  (0.6-1.1cm)  LVPWd:           1.00 cm  (0.6-1.1cm)  LVIDd:           4.50 cm  (3.9-5.9cm)  LVIDs:           2.90 cm  LV Mass Index:   76 g/m2  LVEDV Index:     43 ml/m2  LV % FS          35.6 %      LA VOLUME:                    Normal Ranges:  LA Vol A4C:        39.2 ml    (22+/-6mL/m2)  LA Vol A2C:        54.9 ml  LA Vol BP:         47.8 ml  LA Vol Index A4C:  19.4ml/m2  LA Vol Index A2C:  27.2 ml/m2  LA Vol Index BP:   23.7 ml/m2  LA Area A4C:       14.9 cm2  LA Area A2C:       18.2 cm2  LA Major Axis A4C: 4.8 cm  LA Major Axis A2C: 5.1 cm  LA Volume Index:   23.7 ml/m2      RA VOLUME BY A/L METHOD:          Normal Ranges:  RA Area A4C:             11.5 cm2      LV SYSTOLIC FUNCTION BY 2D PLANIMETRY (MOD):  Normal Ranges:  EF-A4C View:    76 % (>=55%)  EF-A2C View:    68 %  EF-Biplane:     72 %  EF-Visual:      68 %  LV EF Reported: 68 %      LV DIASTOLIC FUNCTION:             Normal Ranges:  MV Peak E:             0.97 m/s    (0.7-1.2 m/s)  MV Peak A:             1.33 m/s    (0.42-0.7 m/s)  E/A Ratio:             0.73        (1.0-2.2)  MV e'                  0.073 m/s   (>8.0)  MV lateral e'          0.07 m/s  MV medial e'           0.07 m/s  MV A Dur:               129.00 msec  E/e' Ratio:            13.26       (<8.0)  MV DT:                 232 msec    (150-240 msec)      MITRAL VALVE:          Normal Ranges:  MV DT:        232 msec (150-240msec)      AORTIC VALVE:            Normal Ranges:  AoV Vmax:      1.85 m/s  (<=1.7m/s)  AoV Peak P.7 mmHg (<20mmHg)  LVOT Max Taye:  1.54 m/s  (<=1.1m/s)  LVOT VTI:      33.30 cm  LVOT Diameter: 2.00 cm   (1.8-2.4cm)  AoV Area,Vmax: 2.62 cm2  (2.5-4.5cm2)      RIGHT VENTRICLE:  TAPSE: 28.1 mm  RV s'  0.16 m/s      TRICUSPID VALVE/RVSP:         Normal Ranges:  IVC Diam:             1.50 cm      PULMONIC VALVE:          Normal Ranges:  PV Accel Time:  85 msec  (>120ms)  PV Max Taye:     0.9 m/s  (0.6-0.9m/s)  PV Max PG:      3.1 mmHg      [id number] Andres Anderson MD  Electronically signed on 2024 at 3:49:38 PM        ** Final **    , CARDIAC CATH      No results found for this or any previous visit from the past 730 days.   , CXR       XR chest 2 views 2024    Narrative  Interpreted By:  Deshawn Lozano and Ritchie Brandon  STUDY:  XR CHEST 2 VIEWS;  2024 11:47 am    INDICATION:  Signs/Symptoms:Chest pain.    COMPARISON:  CT chest 2023 chest x-ray 2023    ACCESSION NUMBER(S):  [id number]    ORDERING CLINICIAN:  NAKUL PURVIS    FINDINGS:  PA and lateral radiographs of the chest were provided.  Additional PA  dual energy images were also provided.    CARDIOMEDIASTINAL SILHOUETTE:  Cardiomediastinal silhouette is normal in size and configuration.    LUNGS:  Redemonstration of solitary pulmonary nodule right upper lobe better  characterized on CT of the chest from 2023. The nodule appears  decreased in size when compared to prior chest radiograph from  2023. Bibasilar areas of linear atelectasis/scarring. Findings  are superimposed on chronic emphysematous changes of the lung there  are bibasilar areas of linear atelectasis. No new pleural effusion,  pneumothorax.    ABDOMEN:  No remarkable  upper abdominal findings.    BONES:  No acute osseous changes.    Impression  1.  Redemonstration of solitary pulmonary nodule in the right upper  lobe, better characterized on CT of the chest from 12/14/2023.  overall decreased in size when compared to prior chest radiograph  from 06/09/2023.  2. Bibasilar areas of linear atelectasis/scarring. Findings are  superimposed on chronic emphysematous changes of the lung. No  radiographic evidence of new acute cardiopulmonary process.    I personally reviewed the images/study and I agree with the findings  as stated by resident Arjun Louie. This study was interpreted  at [place], [place].    MACRO:  None    Signed by: Deshawn Lozano 1/26/2024 11:06 AM  Dictation workstation:   [id number]    , CT Head/Neck     No results found for this or any previous visit from the past 760 days.    , CT Chest        CT chest wo IV contrast 01/23/2025    Narrative  Interpreted By:  Aneesh Rivera,  and Brionna Veliz  STUDY:  CT CHEST WO IV CONTRAST;  1/23/2025 9:10 am    INDICATION:  Signs/Symptoms:history of lung cancer s/p RT.    COMPARISON:  None    ACCESSION NUMBER(S):  [id number]    ORDERING CLINICIAN:  REJI LEDESMA    TECHNIQUE:  Helical data acquisition of the chest was obtained without  intravenous contrast. Images were reformatted in axial, coronal, and  sagittal planes.    FINDINGS:  LUNGS AND AIRWAYS:  The trachea and central airways are patent. No endobronchial lesion  is seen.There are postradiation changes in the right mid to upper  lung. There has been interval slightly increased size of the  spiculated area of nodular/masslike consolidation within the right  upper lobe measuring 3.6 x 1.6 cm (series 3, image 112), previously  measuring 3.1 x 1.5 cm. The nodularity is more conspicuous on the  coronal plane as seen on series 900, image 108. Additional nodular  opacity posteriorly measures 1.2 cm with spiculated  margins (series  3, image 113), previously measuring 1.1 cm and a nodular opacity  anteriorly measures. There is surrounding interlobular septal  thickening as well as adjacent linear parenchymal opacities. Few  additional scattered small nodules throughout bilateral lungs are  also present.    No pleural effusion or pneumothorax.      MEDIASTINUM AND LIZABETH, LOWER NECK AND AXILLA:  Partially calcified nodule in the posterior aspect of the left  thyroid lobe Multiple prominent mediastinal lymph nodes such as a  right paratracheal lymph node measuring 0.8 cm. Findings are grossly  unchanged when compared to the prior exam. No axillary  lymphadenopathy. Esophagus appears within normal limits as seen.    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 Main pulmonary artery and its branches are normal in caliber.  Mild to moderate coronary artery calcifications are seen.Please  note,the study is not optimized for evaluation of coronary arteries.  The cardiac chambers are not enlarged. There is no pericardial  effusion seen.    UPPER ABDOMEN:  Again noted are multiple hypodense lesion in the left hepatic lobe  similar to the prior exam measuring up to 1.7 cm (series 2, image  283) with simple fluid attenuation. There is a renal cysts in the  left upper pole measuring 4.7 cm.        CHEST WALL AND OSSEOUS STRUCTURES:  Small bilateral gynecomastia. Otherwise, chest wall is within normal  limits. No acute osseous pathology.There are no suspicious osseous  lesions.    Impression  1.  Interval slightly increased size of a masslike consolidation with  spiculated margins in the right upper lobe now measuring 3.6 x 1.6 cm  as well as additional increased area of interlobular septal  thickening and linear parenchymal opacities. There has been also  interval slightly increased size of an additional ill-defined  nodular  opacity in the right upper lobe posteriorly measuring 1.4 x 1.2 cm.  There is surrounding postradiation changes. Considering the gradual  increase in size of the nodular consolidations, there is concern for  possible local disease recurrence. May consider PET-CT for better  characterization followed by tissue diagnosis if clinically indicated.    I personally reviewed the images/study and I agree with the findings  as stated by resident physician Dr. Jose Alfredo Rousseau . This study  was interpreted at [facility],  [place], [place].    MACRO:  None    Signed by: Aneesh Rivera 1/23/2025 6:08 PM  Dictation workstation:   [id number]   , CT Abdomin       CT abdomen pelvis w IV contrast 05/11/2024    Narrative  STUDY:  CT Abdomen and Pelvis with IV Contrast; 5/11/2024 2:25 PM  INDICATION:  Incarcerated inguinal hernia.  COMPARISON:  6/10/2023 CT CAP.  ACCESSION NUMBER(S):  [id number]  ORDERING CLINICIAN:  DEMETRIO SCOTT  TECHNIQUE:  CT of the abdomen and pelvis was performed.  Contiguous axial images  were obtained at 3 mm slice thickness through the abdomen and pelvis.  Coronal and sagittal reconstructions at 3 mm slice thickness were  performed.  Omnipaque 350 80 mL was administered intravenously.  FINDINGS:  LOWER CHEST:  No cardiomegaly.  No pericardial effusion.  Lung bases are clear.    ABDOMEN:    LIVER:  No hepatomegaly.  Smooth surface contour.  Normal attenuation.    BILE DUCTS:  No intrahepatic or extrahepatic biliary ductal dilatation.    GALLBLADDER:  There is a gallstone, but no evidence for cholecystitis or biliary  obstruction.  STOMACH:  No abnormalities identified.    PANCREAS:  No masses or ductal dilatation.    SPLEEN:  No splenomegaly or focal splenic lesion.    ADRENAL GLANDS:  No thickening or nodules.    KIDNEYS AND URETERS:  Kidneys are normal in size and location.  No renal or ureteral  calculi.    PELVIS:    BLADDER:  No abnormalities  identified.    REPRODUCTIVE ORGANS:  No abnormalities identified.    BOWEL:  No abnormalities identified. The appendix is identified and is  unremarkable.    VESSELS:  No abnormalities identified.  Abdominal aorta is normal in caliber.    PERITONEUM/RETROPERITONEUM/LYMPH NODES:  No free fluid.  No pneumoperitoneum.  No lymphadenopathy.    ABDOMINAL WALL:  No abnormalities identified. There are bilateral inguinal hernias  containing fat and vasculature, but no loops of bowel.  SOFT TISSUES:  No abnormalities identified.    BONES:  No acute fracture or aggressive osseous lesion.    Impression  1. There are bilateral inguinal hernias containing fat and  vasculature, but no loops of bowel.  2. There is a gallstone, but no evidence for cholecystitis or biliary  obstruction.  Signed by Dean Pereira MD    SOCIAL:  Social History     Tobacco Use   Smoking Status Former    Types: Cigarettes   Smokeless Tobacco Never      Social History     Substance and Sexual Activity   Alcohol Use Not Currently      Social History     Substance and Sexual Activity   Drug Use Never        NPO STATUS:  No data recorded    Clinical Areas Reviewed:                   Anesthesia Assessment:    Physical Exam    Airway  TM distance: >3 FB  Neck ROM: full     Cardiovascular - normal exam     Dental    Pulmonary - normal exam     Abdominal - normal exam             Anesthesia Plan    History of general anesthesia?: yes  History of complications of general anesthesia?: no    ASA 3     general     intravenous induction   Postoperative administration of opioids is intended.  Anesthetic plan and risks discussed with patient.  Use of blood products discussed with patient who.    Plan discussed with CAA and attending.

## 2025-02-18 NOTE — ANESTHESIA PROCEDURE NOTES
Airway  Date/Time: 2/18/2025 10:56 AM  Urgency: elective    Airway not difficult    Staffing  Performed: MARIELENA, attending and resident   Authorized by: Zaid Mccoy MD    Performed by: MARIELENA Lynn  Patient location during procedure: OR    Indications and Patient Condition  Indications for airway management: anesthesia and airway protection  Spontaneous ventilation: present  Sedation level: deep  Preoxygenated: yes  Patient position: sniffing  MILS not maintained throughout  Mask difficulty assessment: 1 - vent by mask    Final Airway Details  Final airway type: endotracheal airway      Successful airway: ETT  Cuffed: yes   Successful intubation technique: direct laryngoscopy  Facilitating devices/methods: intubating stylet  Blade: Pradeep  Blade size: #4  ETT size (mm): 8.5  Cormack-Lehane Classification: grade IIa - partial view of glottis  Placement verified by: chest auscultation, bronchoscopy and palpation of cuff   Measured from: teeth  ETT to teeth (cm): 21  Number of attempts at approach: 1    Additional Comments  Intubation performed by [facility] resident under supervision of Attending and AA

## 2025-02-18 NOTE — ANESTHESIA POSTPROCEDURE EVALUATION
Patient: Nadine Smith    Procedure Summary       Date: 02/18/25 Room / Location: [facility]    Anesthesia Start: 1049 Anesthesia Stop: 1417    Procedure: BRONCHOSCOPY Diagnosis:       Non-small cell cancer of right lung (Multi)      Lung nodules    Scheduled Providers: Fatimah Muhammad MD Responsible Provider: Zaid Mccoy MD    Anesthesia Type: general ASA Status: 3            Anesthesia Type: general    Vitals Value Taken Time   /75 02/18/25 1445   Temp 36 °C (96.8 °F) 02/18/25 1415   Pulse 89 02/18/25 1445   Resp 18 02/18/25 1445   SpO2 93 % 02/18/25 1445       Anesthesia Post Evaluation    Patient location during evaluation: PACU  Patient participation: complete - patient participated  Level of consciousness: awake  Pain management: adequate  Airway patency: patent  Cardiovascular status: acceptable  Respiratory status: acceptable  Hydration status: acceptable  Postoperative Nausea and Vomiting: none        No notable events documented.

## 2025-02-18 NOTE — LETTER
DearNadine       2/6/2025                                                                                                       INFORMATION FOR YOUR PROCEDURE    INSTRUCTIONS MUST BE FOLLOWED OR YOU RUN THE RISK OF YOUR CASE BEING CANCELED    Information is attached to this e-mail for your upcoming procedure. (Look for the paperclip in your email to access this information)  Lab requisitions (if needed), are also included as well as procedural instructions.       You are scheduled for your Bronchoscopy on 2/18/25  , with Dr. Fatimah Muhammad [facility] [address] [place] [address]    09:30 AM    - CT  Scan  [facility]   2nd Floor Radiology  [address]    When finished with the CT scan,   please make your way to [facility] Room 1300.  This is right around the corner from [facility].  Located on the first floor.  There are information desks there for your convenience and if you have questions.    Check In will be at  10:30  AM.  This allows us to prepare you for the actual procedure.                                        Bronchoscopy   Location:  [address] [facility]                                         Bronchoscopy / Endoscopy Suite    NPO (No food or drink) after midnight the night before your procedure. This includes coffee, water, and soda, hard candy, gum or mints.  If taking your morning medications, a small sip of water is allowed to get the medication down.    For your safety, you must have a responsible, adult  accompany you to your procedure.  You will not be permitted to drive yourself home if you have received any type of anesthesia or sedation.    Automated calls about your upcoming scheduled appointments can be quite confusing for patients.    The times may vary depending on what you have scheduled for procedure day. Follow the time/s I have given you on your information sheet so there is no confusion.  Be aware you  may receive conflicting times from different departments.      Blood work will need to be done prior to your procedure, preferably at a  Facility.  There are no restrictions for testing. I have attached a requisition for you.    Please reach out to me with any questions you may have  Laila  [phone] or Dwayne @ [phone]    If you have an emergency (weather or other) on the day of your scheduled procedure and need to cancel for any reason, Please call the Endoscopy Suite @ [phone],  Otherwise, call Laila @ [phone]      Have a nice day!    Laila Morley    Bronchoscopy   Interventional Pulmonology    MD Ida Patel MD Sameer Avasarala, MD Catalina Teba, MD Andrew Dunatchik, MD Sruti Brahmandam, MD      [facility]  Pulmonary, Critical Care and Sleep Medicine  [address] [place] [address]  P -[phone]  - [phone]  [email or web address]

## 2025-02-18 NOTE — ANESTHESIA PROCEDURE NOTES
Peripheral IV  Date/Time: 2/18/2025 11:00 AM      Placement  Needle size: 20 G  Laterality: right  Location: hand  Site prep: alcohol  Technique: anatomical landmarks  Attempts: 1

## 2025-02-21 LAB
LAB AP ASR DISCLAIMER: NORMAL
LABORATORY COMMENT REPORT: NORMAL
PATH REPORT.COMMENTS IMP SPEC: NORMAL
PATH REPORT.FINAL DX SPEC: NORMAL
PATH REPORT.GROSS SPEC: NORMAL
PATH REPORT.TOTAL CANCER: NORMAL

## 2025-02-25 LAB
LAB AP ASR DISCLAIMER: NORMAL
LABORATORY COMMENT REPORT: NORMAL
PATH REPORT.ADDENDUM SPEC: NORMAL
PATH REPORT.COMMENTS IMP SPEC: NORMAL
PATH REPORT.FINAL DX SPEC: NORMAL
PATH REPORT.FINAL DX SPEC: NORMAL
PATH REPORT.GROSS SPEC: NORMAL
PATH REPORT.GROSS SPEC: NORMAL
PATH REPORT.INTRAOP OBS SPEC DOC: NORMAL
PATH REPORT.RELEVANT HX SPEC: NORMAL
PATH REPORT.TOTAL CANCER: NORMAL
PATH REPORT.TOTAL CANCER: NORMAL

## 2025-02-27 ENCOUNTER — TELEPHONE (OUTPATIENT)
Dept: RADIATION ONCOLOGY | Facility: HOSPITAL | Age: 80
End: 2025-02-27
Payer: MEDICARE

## 2025-03-03 ENCOUNTER — HOSPITAL ENCOUNTER (OUTPATIENT)
Dept: RADIATION ONCOLOGY | Facility: HOSPITAL | Age: 80
Setting detail: RADIATION/ONCOLOGY SERIES
Discharge: HOME | End: 2025-03-03
Payer: MEDICARE

## 2025-03-03 VITALS
BODY MASS INDEX: 26.87 KG/M2 | HEART RATE: 84 BPM | TEMPERATURE: 97.3 F | SYSTOLIC BLOOD PRESSURE: 137 MMHG | OXYGEN SATURATION: 94 % | RESPIRATION RATE: 18 BRPM | WEIGHT: 187.3 LBS | DIASTOLIC BLOOD PRESSURE: 73 MMHG

## 2025-03-03 DIAGNOSIS — C34.91 NON-SMALL CELL CANCER OF RIGHT LUNG (MULTI): Primary | ICD-10-CM

## 2025-03-03 PROCEDURE — 99215 OFFICE O/P EST HI 40 MIN: CPT | Performed by: RADIOLOGY

## 2025-03-03 PROCEDURE — G2211 COMPLEX E/M VISIT ADD ON: HCPCS | Performed by: RADIOLOGY

## 2025-03-03 ASSESSMENT — COLUMBIA-SUICIDE SEVERITY RATING SCALE - C-SSRS
2. HAVE YOU ACTUALLY HAD ANY THOUGHTS OF KILLING YOURSELF?: NO
1. IN THE PAST MONTH, HAVE YOU WISHED YOU WERE DEAD OR WISHED YOU COULD GO TO SLEEP AND NOT WAKE UP?: NO
6. HAVE YOU EVER DONE ANYTHING, STARTED TO DO ANYTHING, OR PREPARED TO DO ANYTHING TO END YOUR LIFE?: NO

## 2025-03-03 ASSESSMENT — ENCOUNTER SYMPTOMS
NEUROLOGICAL NEGATIVE: 1
EYES NEGATIVE: 1
COUGH: 1
HEMATOLOGIC/LYMPHATIC NEGATIVE: 1
PSYCHIATRIC NEGATIVE: 1
SHORTNESS OF BREATH: 1
FATIGUE: 1
MUSCULOSKELETAL NEGATIVE: 1
WHEEZING: 1
GASTROINTESTINAL NEGATIVE: 1
LEG SWELLING: 1
ENDOCRINE NEGATIVE: 1

## 2025-03-03 ASSESSMENT — PAIN SCALES - GENERAL: PAINLEVEL_OUTOF10: 0-NO PAIN

## 2025-03-03 NOTE — PROGRESS NOTES
Radiation Oncology Nursing Note    Prior Radiotherapy:  Yes, describe:    No radiation treatments to show. (Treatments may have been administered in another system.)     Current Systemic Treatment:  No     Presence of Pacemaker or ICD:  No    History of Autoimmune or Connective Tissue Disorders:  No    Pain: The patient's current pain level was assessed.  They report currently having a pain of 0 out of 10.  They feel their pain is under control without the use of pain medications.    Review of Systems:  Review of Systems   Constitutional:  Positive for fatigue.   HENT:  Negative.     Eyes: Negative.    Respiratory:  Positive for cough, shortness of breath and wheezing.    Cardiovascular:  Positive for leg swelling (bilateral ankles over last few days).   Gastrointestinal: Negative.    Endocrine: Negative.    Genitourinary: Negative.     Musculoskeletal: Negative.    Skin: Negative.    Neurological: Negative.    Hematological: Negative.    Psychiatric/Behavioral: Negative.        Patient here with his wife to discuss radiation for recurrent lung cancer.  Patient states he has been having increasing wheezing and SOB over the last few months.  Patient with cough since bronch.  Patient unable to go up stairs d/t SOB.

## 2025-03-03 NOTE — PROGRESS NOTES
Radiation Oncology Outpatient Consult    Patient Name:  Nadine Smith  MRN:  [id number]  :  1945    Referring Provider: Kera Bradley APRN*  Primary Care Provider: Loree Munoz MD  Care Team: Patient Care Team:  Loree Munoz MD as PCP - General    Date of Service: 3/3/2025     History of Present Illness:  Nadine Smith is a 79 y.o. adult with PMH of HTN, BPH who was referred by Kera Bradley APRN*, for a consultation to the [facility] Department of Radiation Oncology.  He has a known diagnosis of early stage NSCLC, adenocarcinoma, [id number], stage IIA, EBUS negative for station 7 lymph nodes s/p SBRT 2023. He is now presenting for evaluation and management of recurrent NSCLC.     His oncological work up and treatment history is as below:  Bronchoscopy with EBUS on 06/15/2023 by Dr. Sutton which has confirmed invasive poorly differentiated adenocarcinoma with negative station 7 N. PDL1 M 1%, negative for targeting mutations.  He was seen by Dr. Reynolds in thoracic surgery and underwent additional work up with demonstrated poor PFTs and hence not a surgical candidate.  PFT 2023: FEV1 < 43% predicted, FVC < 56% prdicted, DLCO <26% predicted.  Stereotactic body radiation therapy (SBRT) to the RUL given from 2023 through 2023, 60 Gy in 5 fractions.   Undergoing surveillance scans. CT Scan 2025 demonstrated concern for parenchymal recurrence overlapping with post radiation changes. PET-CT 2025 demonstrated avidity in area of concern (details below).  Bronchoscopy with EBUS on 2025, Fatimah Muhammad MD   Rapid On-Site Evaluation (DORCAS):  Preliminary cytology from RUL lesions was non-diagnostic. Preliminary cytology from the lymph node station(s) 4L, 4R, 7, 11L, 11Rs showed lymphoid tissue. Lymph node 11Rs was suggestive of non-diagnostic material.     Presents to discuss next  steps.      Pathology Review:  The pertinent pathology results were reviewed from EMR and discussed with the patient.       Cytology-Non GYN [Jun 16 2023 2:43PM]     A. FINE NEEDLE ASPIRATION LUNG - RIGHT UPPER LOBE NODULE, CYTOLOGY AND CELL BLOCK: MALIGNANT CELLS PRESENT DERIVED FROM ADENOCARCINOMA OF LUNG, SEE NOTE. NOTE: The malignant cells are positive for TTF-1 and p40.   B. FINE NEEDLE ASPIRATION 7 LYMPH  NODE, CYTOLOGY AND CELL BLOCK:   NO MALIGNANT CELLS IDENTIFIED. LYMPHOID SAMPLE.   MICROSATELLITE STATUS: Microsatellite Instability-High (MSI-H) is NOT DETECTED.   DISEASE ASSOCIATED GENOMIC FINDINGS:   BRAF p.G469V ([id number] c.1406G>T)    TP53 p.H179Y ([id number] c.535C>T)   DISEASE RELEVANT ALTERATIONS NOT DETECTED:   Negative for ALK fusion.   Negative for BRAF V600E.   Negative for EGFR sensitizing mutation.   Negative for ERBB2 activating mutation   Negative  for KRAS G12C.   Negative for MET exon 14 skipping mutation.   Negative for NTRK fusion.   Negative for RET fusion.   Negative for ROS1 fusion.   ===========================  Next Report ===========================     Surgical Pathology [Ra 15 2023 3:56PM]    FINAL DIAGNOSIS   LUNG, RIGHT UPPER LOBE NODULE, BIOPSY:   --INVASIVE POORLY DIFFERENTIATED ADENOCARCINOMA ADENOCARCINOMA.   Note: By immunostaining, the tumor cells are positive for TTF-1 and negative for p40, supportive of the above diagnosis.  PD-L1 has been ordered, and the results will be issued in an adsendum. Molecular testing has been ordered on the above-noted cytology specimen, and the results will be issued in an   addendum to that report.   Addendum Diagnosis   PD-L1 22C3  by Immunohistochemistry with Interpretation, pembrolizumab (KEYTRUDA)   Interpretation: NO EXPRESSION   Tumor Proportion Score (TPS): <1%       FINAL DIAGNOSIS 2/18/2025   A. LUNG, RIGHT UPPER LOBE NODULE#1; BIOPSY:   -- Rare highly atypical glands, consistent with adenocarcinoma, acinar pattern.   PDL1 Tumor  Proportion Score (TPS): <1%      B. LUNG, RIGHT UPPER LOBE NODULE#2; BIOPSY:   -- Non-diagnostic fragments of lung parenchyma.     Final Cytological Interpretation 2/18/2025   A. LUNG FINE NEEDLE ASPIRATION RIGHT UPPER LOBE- RUL NODULE #1 : PET POSITIVE, CYTOLOGY AND CELL BLOCK:   -- Non diagnostic specimen due to insufficient cellularity.  -- Please refer to concurrent surgical specimen, [id number].                            B. BRONCHIAL BRUSH RIGHT UPPER LOBE - RUL NODULE #1 : PET POSITIVE , CYTOLOGY AND CELL BLOCK:   -- Non diagnostic specimen due to insufficient cellularity.  -- Please refer to concurrent surgical specimen, [id number].              C. LUNG FINE NEEDLE ASPIRATION RIGHT UPPER LOBE - RUL NODULE #2 , CYTOLOGY AND CELL BLOCK:   -- Non diagnostic specimen due to insufficient cellularity.  -- Please refer to concurrent surgical specimen, [id number].                            D. LYMPH NODE 4 L PULMONARY FINE NEEDLE ASPIRATION, CYTOLOGY AND CELL BLOCK:    -- No malignant cells identified.  -- Lymphoid sample.            E. LYMPH NODE 7 PULMONARY FINE NEEDLE ASPIRATION, CYTOLOGY AND CELL BLOCK:    -- No malignant cells identified.  -- Lymphoid sample.     F. LYMPH NODE 4 R PULMONARY FINE NEEDLE ASPIRATION, CYTOLOGY AND CELL BLOCK:   -- No malignant cells identified.  -- Lymphoid sample.     G. LYMPH NODE 11 Rs PULMONARY FINE NEEDLE ASPIRATION, CYTOLOGY AND CELL BLOCK:   -- Non diagnostic specimen due to insufficient cellularity.     H. LYMPH NODE 11 Ri PULMONARY FINE NEEDLE ASPIRATION, CYTOLOGY AND CELL BLOCK:   -- No malignant cells identified.  -- Lymphoid sample       Imaging:  The pertinent imaging results were reviewed from EMR/PACS with key results discussed with the patient.    MRI Brain w/wo Contrast [Jul 17 2023 5:49AM]  IMPRESSION:   There are  no abnormal intracranial enhancing mass lesion to suggest brain metastasis. There is moderate brain parenchymal volume loss. There are nonspecific  white matter changes within the cerebral hemispheres bilaterally as well as scattered foci of increased  signal on the FLAIR and T2 weighted images overlying the brainstem which while nonspecific, given the patient's age, likely represent sequelae of more remote small-vessel ischemic change.    Echocardiogram [Jul 13 2023 11:16AM]  CONCLUSIONS:   1. Left ventricular systolic function is normal with a 60-65% estimated ejection fraction.    PET/CT Lung Ca Initial [Jul 12 2023 2:46PM]  Hypermetabolic focus in the region of the right parotid gland with max SUV of 5.5. Hypermetabolic anterior right upper lobe mass with max SUV 16.1. Mildly hypermetabolic mediastinal and bilateral  hilar lymph nodes. A right hilar node demonstrates max SUV of 3.7. Right paratracheal node demonstrates max SUV of 3.4. A left hilar node demonstrates max SUV of 3.5. No hypermetabolic soft tissue lesion is present in the abdomen and pelvis. No evidence  of hypermetabolic lymphadenopathy. There is no focal hypermetabolic lesion to suggest osseous metastasis.   IMPRESSION:   1. Hypermetabolic anterior right upper lobe mass consistent with biopsy-proven malignancy.   2. Nonspecific mildly hypermetabolic  mediastinal and bilateral hilar lymph nodes which may be reactive innature, however metastatic disease can not be definitely excluded.   3. Hypermetabolic focus in the region of the right parotid gland which is favored to represent a benign process  such as a Warthin's tumor with a metastatic lymph node felt to be less likely.      CT chest wo IV contrast 01/23/2025   1.  Interval slightly increased size of a masslike consolidation with  spiculated margins in the right upper lobe now measuring 3.6 x 1.6 cm  as well as additional increased area of interlobular septal  thickening and linear parenchymal opacities. There has been also  interval slightly increased size of an additional ill-defined nodular  opacity in the right upper lobe posteriorly  measuring 1.4 x 1.2 cm.  There is surrounding postradiation changes. Considering the gradual  increase in size of the nodular consolidations, there is concern for  possible local disease recurrence. May consider PET-CT for better  characterization followed by tissue diagnosis if clinically indicated.        PET CT LUNG CA STAGING; 1/30/2025   1. *A FDG avid lesion along the anterior aspect of right upper lobe with  a SUV max of 7.6 versus 16 previously, likely represent residual disease versus local recurrence.   Mildly FDG avid opacities in the right upper lobe adjacent to the aforementioned mass, with SUV  max 2.1, likely representing post radiation changes.    2. 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  3. No PET evidence of distant metastasis.        PFT 2/10/2025:  Spirometry shows no obstruction. There is no significant bronchodilator response. Lung volumes indicate a mild restrictive defect. Spirometry (normal FEV1/FVC) and lung volumes (increased RV/TLC) suggest a complex restrictive ventilatory impairment.   FEV1: 134 L/ 48% predicted.  FVC: 2.13 L/ 56% predicted.  Patient unable to follow instructions for DLCO testing.      Review of Systems: See separately signed RN note.  Increasing shortness of breath with exertion, gradually over the last 6 months. This is more noticeable in the last 1-2 months.   Sleeps on chair in the last 1-2 weeks (started before the biopsy).   Able to ADLs, does slowly.   Lives in apartment complex, single level residence.  Walks around in the building 4-5 times ([address]).  Currently fasting month (started last weekend, 30-day fast).    RADIATION SCREENING QUESTIONS:  Prior radiation therapy: Yes, describe: See HPI  Pacemaker: No  Other implantable devices: No  Connective tissue disease: No    Current Systemic Treatment:  No     Past Medical History:    Past Medical History:   Diagnosis Date    Bilateral inguinal  hernia     R>L    BPH (benign prostatic hyperplasia)     Cataract     COPD (chronic obstructive pulmonary disease) (Multi)     PFTs with severe obstruction. Echo with normal EF.  Continue bevespi and albuterol    COVID-19 10/2021    Disorder of prostate, unspecified     Prostate troubles    Hypertension     NSCLC of right lung (Multi)     s/p SBRT that finished in Sept 2023 (08/2023-09/2023)    Pulmonary nodule     RUL    Snoring     Snoring    TIA (transient ischemic attack)      Past Surgical History:    Past Surgical History:   Procedure Laterality Date    BRONCHOSCOPY      Bronchoscopy with biopsy showed adenocarcinoma.    COLONOSCOPY      CT ANGIO NECK  05/18/2017    CT NECK ANGIO W AND WO IV CONTRAST 5/18/2017 [facility]GENCY LEGAC[facility]    CT HEAD ANGIO W AND WO IV CONTRAST  05/18/2017    CT HEAD ANGIO W AND WO IV CONTRAST 5/18/2017 [facility] EMERGENCY LEGAC[facility]      Family History:  Cancer-related family history includes Cancer in her father.  Social History:    Social History     Tobacco Use    Smoking status: Former     Types: Cigarettes    Smokeless tobacco: Never   Vaping Use    Vaping status: Never Used   Substance Use Topics    Alcohol use: Not Currently    Drug use: Never     Allergies:  No Known Allergies  Medications:    Current Outpatient Medications:     albuterol (Ventolin HFA) 90 mcg/actuation inhaler, Inhale 2 puffs every 4 hours if needed for wheezing or shortness of breath., Disp: 24 g, Rfl: 3    albuterol 2.5 mg /3 mL (0.083 %) nebulizer solution, Take 3 mL (2.5 mg) by nebulization 4 times a day as needed for wheezing or shortness of breath., Disp: 180 mL, Rfl: 5    amLODIPine (Norvasc) 10 mg tablet, Take 1 tablet (10 mg) by mouth once daily., Disp: , Rfl:     aspirin 81 mg EC tablet, Take 1 tablet (81 mg) by mouth once daily., Disp: , Rfl:     budesonide-glycopyr-formoterol (BREZTRI) 160-9-4.8 mcg/actuation HFA aerosol inhaler, Inhale 2 puffs 2 times a day., Disp: 32.1 g, Rfl: 3    finasteride (Proscar) 5  mg tablet, Take 1 tablet (5 mg) by mouth once daily., Disp: , Rfl:     tamsulosin (Flomax) 0.4 mg 24 hr capsule, Take 1 capsule (0.4 mg) by mouth 2 times a day. 30 minutes after the same meal each day., Disp: , Rfl:       Performance Status:  The Karnofsky performance scale today is 70, Cares for self; unable to carry on normal activity or to do active work (ECOG equivalent 1).     OBJECTIVE  Physical Exam:  /73   Pulse 84   Temp 36.3 °C (97.3 °F) (Temporal)   Resp 18   Wt 85 kg (187 lb 4.8 oz)   SpO2 94%   BMI 26.87 kg/m²    Physical Exam  Constitutional:       General: She is not in acute distress.     Appearance: She is not ill-appearing.   HENT:      Head: Normocephalic and atraumatic.   Eyes:      General: No scleral icterus.  Cardiovascular:      Rate and Rhythm: Normal rate and regular rhythm.   Pulmonary:      Effort: Pulmonary effort is normal. No respiratory distress.      Breath sounds: Wheezing present.      Comments: No cough bouts during the encounter  Chest:      Chest wall: No tenderness.   Musculoskeletal:      Cervical back: No rigidity.      Right lower leg: No edema.      Left lower leg: No edema.   Skin:     Coloration: Skin is not jaundiced.   Neurological:      General: No focal deficit present.      Mental Status: She is alert and oriented to person, place, and time.      Cranial Nerves: No cranial nerve deficit.      Coordination: Coordination normal.   Psychiatric:         Mood and Affect: Mood normal.         Behavior: Behavior normal.          Laboratory Review:  The pertinent lab results were reviewed and discussed with the patient.      ASSESSMENT:  Nadine Smith is a 79 y.o. adult with with HTN, BPH, who presented to  ED in May 2023 complaining of intermittent coughing & started to cough up sputum mixed with blood for 2 days and was found to have RUL  mass leading to a diagnosis of early stage NSCLC, adenocarcinoma, [id number], stage IIA, EBUS negative for station 7  lymph nodes. Due to poor PFTs, he was not felt to be a surgical candidate and treated with SBRT 60 Gray in 5 fractions from August 30 to September 11, 2023.  He was being followed with surveillance CT scans which more recently revealed concern for recurrent parenchymal disease adjoining areas of radiation changes these were evaluated with PET/CT and bronchoscopy EBUS confirming recurrent disease (adenocarcinoma) within the lung parenchyma, negative mediastinal nodes 4L, 4R, 7, 11L, 11Rs, PD-L1 TPS less than 1%. He has had decline in his PFTs with resulting significant shortness of breath. Review of imaging has identified possibility of at least two or more foci of disease in close proximity of prior radiation-related changes.    PLAN:    Ms. Smith's pertinent history, exam, imaging and pathology details were reviewed.   Accompanied by wife.     Treatment recommendations/Alternatives:  NCCN Guidelines were applicable to guide this patients treatment plan.   We reviewed the standard of care management for locally recurrent NSCLC including the role of reirradiation. His prior radiation was 17 months ago, which allows possibility of a 2nd course of radiation.     Based on the review of the treatment images, given concern for possibility of two or more foci, this will not be amenable to SBRT. As such a hypofractionated course of radiation would be needed.    We discussed following options:  1) Reirradiation hypofractionated radiation    2) Reirradiation chemoRT +/- IO consolidation   3) Chemo/IO alone (PDL1 < 1%) or Clinical trials.     I have reached out to Dr. Jordan's team to arrange a visit to discuss systemic therapy options.    He will also need further optimization of his cardiopulmonary function, and a message has been sent to the two teams.     Radiation treatment logistics:  We then focused our attention regarding logistics, rationale and potential risks with radiation therapy. This will need an initial  simulation/mapping that will involve a planning CT scan in treatment position followed by a 2-3 week planning period and then initiation of radiation treatments.     We then reviewed the role of advanced radiation modalities including proton therapy and VMAT/IMRT (photons). Given the setting of reirradiation and declining PFTs, there might be dosimetric benefits of considering proton therapy over IMRT/VMAT to reduce dose to the lungs, heart, esophagus and cord. This could potentially translate to reduced risk of acute and delayed complications.     We then reviewed possible side effects (Acute and long-term). Common and uncommon side effects with risks as relevant for his case were discussed, especially in the setting of reirradiation including radiation pneumonitis, bronchovascular injury including ulceration/fistulization, bleeding, lobar collapse or death.    After detailed discussion of risks/benefits/goals/alternatives, patient signed the informed consent.  CT simulation will be arranged at the earliest once a decision has been coordinated with other teams.    Orders placed:  1) [facility] intent to treat: Proton    Network location: The patient will be treated at the [facility] location. Simulation will be done at the [facility] location.    Pain Management:  No acute needs    Social Work:  No acute needs    Nutrition: No acute needs    Clinical Trials: None applicable.    LONGITUDINAL CARE, EXTRA EFFORT/ : The patient will be followed longitudinally by providers (including APPs) in the department of radiation oncology for monitoring treatment effects during and after radiation. Additional effort needed in the setting of high risk of toxicity, declining PFT and coordination of care with other teams.     The patient was provided my contact information.       Deep Lee MD, MMM  Senior Attending Physician, [facility]  Professor, [employer]   Our [place]: “To  Heal, To Teach, To Discover.”  RN partner: [phone]/ [email or web address]    Phone (scheduling): [phone]  Proton Therapy (scheduling): [phone]/ [email or web address]  Phone (after hours): [phone]

## 2025-03-07 DIAGNOSIS — J43.9 PULMONARY EMPHYSEMA, UNSPECIFIED EMPHYSEMA TYPE (MULTI): ICD-10-CM

## 2025-03-07 DIAGNOSIS — J44.9 CHRONIC OBSTRUCTIVE PULMONARY DISEASE, UNSPECIFIED COPD TYPE (MULTI): ICD-10-CM

## 2025-03-08 ENCOUNTER — HOSPITAL ENCOUNTER (OUTPATIENT)
Dept: RADIOLOGY | Facility: HOSPITAL | Age: 80
Discharge: HOME | End: 2025-03-08
Payer: MEDICARE

## 2025-03-08 DIAGNOSIS — C34.91 NON-SMALL CELL CANCER OF RIGHT LUNG (MULTI): ICD-10-CM

## 2025-03-08 PROCEDURE — A9575 INJ GADOTERATE MEGLUMI 0.1ML: HCPCS | Performed by: RADIOLOGY

## 2025-03-08 PROCEDURE — 70553 MRI BRAIN STEM W/O & W/DYE: CPT

## 2025-03-08 PROCEDURE — 2550000001 HC RX 255 CONTRASTS: Performed by: RADIOLOGY

## 2025-03-08 RX ORDER — GADOTERATE MEGLUMINE 376.9 MG/ML
20 INJECTION INTRAVENOUS
Status: COMPLETED | OUTPATIENT
Start: 2025-03-08 | End: 2025-03-08

## 2025-03-08 RX ADMIN — GADOTERATE MEGLUMINE 17 ML: 376.9 INJECTION INTRAVENOUS at 13:12

## 2025-03-11 RX ORDER — ALBUTEROL SULFATE 0.83 MG/ML
2.5 SOLUTION RESPIRATORY (INHALATION) 4 TIMES DAILY PRN
Qty: 75 ML | Refills: 5 | Status: SHIPPED | OUTPATIENT
Start: 2025-03-11 | End: 2026-03-11

## 2025-03-12 ENCOUNTER — OFFICE VISIT (OUTPATIENT)
Dept: HEMATOLOGY/ONCOLOGY | Facility: HOSPITAL | Age: 80
End: 2025-03-12
Payer: MEDICARE

## 2025-03-12 VITALS
HEART RATE: 80 BPM | RESPIRATION RATE: 18 BRPM | DIASTOLIC BLOOD PRESSURE: 60 MMHG | TEMPERATURE: 97.2 F | SYSTOLIC BLOOD PRESSURE: 139 MMHG | BODY MASS INDEX: 26.57 KG/M2 | OXYGEN SATURATION: 98 % | WEIGHT: 185.19 LBS

## 2025-03-12 DIAGNOSIS — C34.91 NON-SMALL CELL CANCER OF RIGHT LUNG (MULTI): Primary | ICD-10-CM

## 2025-03-12 PROCEDURE — 99215 OFFICE O/P EST HI 40 MIN: CPT | Performed by: INTERNAL MEDICINE

## 2025-03-12 PROCEDURE — 1126F AMNT PAIN NOTED NONE PRSNT: CPT | Performed by: INTERNAL MEDICINE

## 2025-03-12 PROCEDURE — 1036F TOBACCO NON-USER: CPT | Performed by: INTERNAL MEDICINE

## 2025-03-12 ASSESSMENT — ENCOUNTER SYMPTOMS
DIAPHORESIS: 0
ARTHRALGIAS: 0
MYALGIAS: 0
FATIGUE: 1
COUGH: 0
NUMBNESS: 0
FEVER: 0
VISUAL CHANGE: 0
CHILLS: 0
JOINT SWELLING: 0
CHANGE IN BOWEL HABIT: 0
NAUSEA: 0
SHORTNESS OF BREATH: 1
WEAKNESS: 0
NECK PAIN: 0
HEADACHES: 0
SWOLLEN GLANDS: 0
ABDOMINAL PAIN: 0
VERTIGO: 0
SORE THROAT: 0
VOMITING: 0
ANOREXIA: 0

## 2025-03-12 ASSESSMENT — PAIN SCALES - GENERAL: PAINLEVEL_OUTOF10: 0-NO PAIN

## 2025-03-12 NOTE — PATIENT INSTRUCTIONS
No orders per Dr. Jordan. Continue with current regimen of radiation alone.    If you have any questions, please call us at [phone].

## 2025-03-12 NOTE — PROGRESS NOTES
Patient ID: Nadine Smith is a 79 y.o. adult.    DIAGNOSIS     Adenocarcinoma of the lung, poorly differentiated, invasive, TTF-1 positive by immunohistochemistry, date of diagnosis is June 7, 2023.  Repeat bronchoscopy and biopsy from the right upper lobe dated February 18, 2025 showed adenocarcinoma, acinar pattern        STAGING     1- Clinical T2a N0 (PATIENT UNDERWENT ENDOBRONCHIAL ULTRASOUND SYSTEMATIC STAGING OF THE MEDIASTINUM AND RIGHT HILUM)  M0, stage Ib     2-local recurrence within the right upper lobe documented by imaging and biopsy February 2025      CURRENT SITES OF DISEASE     Right upper lobe        MOLECULAR GENOMICS     PD-L1 immunohistochemistry tumor proportional score of less than 1%     TEST: Focused Solid Tumor DNA/RNA Panel   SPECIMEN: Supernatant, LUNG, RIGHT UPPER LOBE, [id number]   COLLECTION DATE: 6/7/2023     MICROSATELLITE STATUS: Microsatellite Instability-High (MSI-H) is NOT DETECTED.     DISEASE ASSOCIATED  GENOMIC FINDINGS:   BRAF p.G469V ([id number] c.1406G>T)   TP53 p.H179Y ([id number] c.535C>T)     DISEASE RELEVANT ALTERATIONS NOT DETECTED:   Negative for ALK fusion.   Negative for BRAF V600E.   Negative for EGFR sensitizing  mutation.   Negative for ERBB2 activating mutation   Negative for KRAS G12C.   Negative for MET exon 14 skipping mutation.   Negative for NTRK fusion.   Negative for RET fusion.   Negative for ROS1 fusion.        SERUM TUMOR MARKER     Not applicable        PRIOR THERAPY     1-stereotactic body radiation therapy (SBRT) given from August 30, 2023 through September 11, 2023, 5 fractions, 60 Gy.        CURRENT THERAPY      consider additional radiation therapy        CURRENT ONCOLOGICAL PROBLEMS     1- Significant underlying pulmonary dysfunction   2-bilateral 3+ leg edema, symmetrical seen March 2025 etiology not clear, Normal echocardiogram November 15, 2024       HISTORY OF PRESENT ILLNESS     This is a 79-year-old patient.  He was seen in the  emergency room on May 15, 2023 with a chief complaint of hemoptysis.  A CT scan of the chest with contrast for pulmonary embolism assessment was performed on May 16, 2023 showing a right upper lobe mass  measuring 3.7 cm with patchy airspace disease within the right upper lobe adjacent to the nodule.  A 6 mm small density was seen in the left upper lobe.  Small low attenuation lesions identified within the left hepatic lobe that may represent cysts.   He was discharged from the hospital on May 17, 2023.  He underwent a bronchoscopy dated June 7, 2023.  No endobronchial lesions were seen.  Right upper lobe nodule transbronchial biopsy demonstrated an invasive poorly differentiated adenocarcinoma, TTF-1  positive, negative for p40.  PD-L1 expression of less than 1%; genomics showed a BRAF G469V as well as a T p53 mutation but no actionable mutations.  Endobronchial ultrasound assessment of a level 7 lymph node was negative.  He comes back to the emergency  room on June 9, 2023 with right-sided chest pain a CT scan of the chest abdomen and pelvis with contrast was performed showing stable mass within the anterior segment of the right upper lobe adjacent irregular interlobular thickening concerning for lymphangitic  component stable appearance of the large and prominent mediastinal lymph nodes, specifically note is made of a 1.2 cm enlarged subaortic lymph node and a 1.1 short axis lymph node within the right hilar region.  Patient was seen by pulmonary medicine  on Jessica 15, 2023 and by thoracic surgery on June 22, 2023.  A combined PET/CT dated July 12, 2023 shows hypermetabolic right anterior lung mass and nonspecific mildly hypermetabolic mediastinal and bilateral hilar lymph nodes that could be reactive.   Hypermetabolic focus in the region of the right parotid gland favored a benign process such as a Warthin tumor pulmonary function test were completed on July 13, 2023 showing an FEV1 of 1.06 L or 43% of the  predicted DLCO also substantially reduced.   Interpretation of these showed severe airway obstruction and severe reduction in diffusion capacity.  An echocardiogram showed normal left ventricular function.  MRI of the brain with and without gadolinium dated July 15 was negative for metastatic disease.   He was seen by radiation oncology on July 24, 2023.        PAST MEDICAL HISTORY     Hypertension  BPH  COPD diagnosed around 2020 on inhalers  Right inguinal repair 2024, May       SOCIAL HISTORY     Patient is , had 3 sons, 1 passed away, lives with his wife in [facility].  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  Stop smoking only recently.  Smoked from the age of 15 to the age of 78, for 63 years, on average 1  pack/day.        CURRENT MEDS     See medication list, meds reviewed        ALLERGIES     Patient denies any drug allergies        FAMILY HISTORY     Patient's father had cancer but he cannot tell me what type, sister had possibly had lung cancer    Subjective    Cancer  Associated symptoms include fatigue. Pertinent negatives include no abdominal pain, anorexia, arthralgias, change in bowel habit, chest pain, chills, congestion, coughing, diaphoresis, fever, headaches, joint swelling, myalgias, nausea, neck pain, numbness, rash, sore throat, swollen glands, urinary symptoms, vertigo, visual change, vomiting or weakness.   Shortness of Breath  This is a chronic problem. The current episode started more than 1 year ago. The problem has been unchanged. Pertinent negatives include no abdominal pain, chest pain, fever, headaches, neck pain, rash, sore throat, swollen glands or vomiting.       Patient notes no new symptoms, he states that his breathing is overall the same in cannot walk up stairs or long distances due to his shortness of breath on exertion, no cough, no hemoptysis, no fever no change in his urination or bowel movement, appetite is good.  I had seen him back in  2023 after which she got SBRT in follow-up by radiation oncology.  Imaging studies in January 2025 showed potential local recurrence confirmed by PET scan dated January 30, 2025 and EBUS dated February 18, 2025.  EBUS also showed no evidence of mediastinal involvement.      Objective    BSA: 2.04 meters squared  /60 (BP Location: Left arm, Patient Position: Sitting, BP Cuff Size: Adult)   Pulse 80   Temp 36.2 °C (97.2 °F) (Temporal)   Resp 18   Wt 84 kg (185 lb 3 oz)   SpO2 98%   BMI 26.57 kg/m²      Physical Exam  Constitutional:       General: She is not in acute distress.     Appearance: Normal appearance. She is not ill-appearing or toxic-appearing.   HENT:      Nose: No congestion or rhinorrhea.      Mouth/Throat:      Pharynx: No oropharyngeal exudate or posterior oropharyngeal erythema.   Eyes:      General: No scleral icterus.     Conjunctiva/sclera: Conjunctivae normal.   Cardiovascular:      Rate and Rhythm: Normal rate and regular rhythm.      Pulses: Normal pulses.      Heart sounds: Normal heart sounds. No murmur heard.     No friction rub. No gallop.   Pulmonary:      Effort: Pulmonary effort is normal. No respiratory distress.      Breath sounds: Normal breath sounds. No stridor. No wheezing, rhonchi or rales.   Chest:      Chest wall: No tenderness.   Abdominal:      General: Abdomen is flat. Bowel sounds are normal. There is no distension.      Palpations: Abdomen is soft. There is no mass.      Tenderness: There is no abdominal tenderness. There is no guarding or rebound.   Musculoskeletal:      Cervical back: No tenderness.      Right lower leg: Edema present.      Left lower leg: Edema present.      Comments: Bilateral 3+ lower extremity edema symmetrical   Lymphadenopathy:      Cervical: No cervical adenopathy.   Skin:     General: Skin is warm.      Coloration: Skin is not jaundiced.   Neurological:      General: No focal deficit present.      Mental Status: She is oriented to  person, place, and time.   Psychiatric:         Mood and Affect: Mood normal.         Behavior: Behavior normal.         Performance Status:  Symptomatic; in bed <50% of the day      NM PET CT LUNG CA STAGING; 1/30/2025   IMPRESSION:  1. A FDG avid lesion along the anterior aspect of right upper lobe,  likely represent residual disease versus local recurrence.  Mildly  FDG avid opacities in the right upper lobe adjacent to the  aforementioned mass, likely representing post radiation changes.  2. 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  3. No PET evidence of distant metastasis.      Assessment/Plan     This is a 79-year-old gentleman treated with SBRT to a right upper lobe lesion in 2023 now with local recurrence within the right upper lobe.  I would recommend treating this with definitive radiation therapy.  Given the absence of lymph node involvement and regional or distant involvement I would not recommend chemotherapy or immunotherapy in this setting.       Cancer Staging   No matching staging information was found for the patient.      Oncology History    No history exists.                 Thai Jordan MD

## 2025-03-24 ENCOUNTER — HOSPITAL ENCOUNTER (OUTPATIENT)
Dept: RADIOLOGY | Facility: EXTERNAL LOCATION | Age: 80
Discharge: HOME | End: 2025-03-24

## 2025-03-24 ENCOUNTER — HOSPITAL ENCOUNTER (OUTPATIENT)
Dept: RADIATION ONCOLOGY | Facility: HOSPITAL | Age: 80
Setting detail: RADIATION/ONCOLOGY SERIES
Discharge: HOME | End: 2025-03-24
Payer: MEDICARE

## 2025-03-24 DIAGNOSIS — C34.11 MALIGNANT NEOPLASM OF UPPER LOBE OF RIGHT LUNG (MULTI): Primary | ICD-10-CM

## 2025-03-24 DIAGNOSIS — C34.11 MALIGNANT NEOPLASM OF UPPER LOBE OF RIGHT LUNG (MULTI): ICD-10-CM

## 2025-03-24 PROCEDURE — 77334 RADIATION TREATMENT AID(S): CPT | Performed by: STUDENT IN AN ORGANIZED HEALTH CARE EDUCATION/TRAINING PROGRAM

## 2025-03-24 PROCEDURE — 77290 THER RAD SIMULAJ FIELD CPLX: CPT | Performed by: STUDENT IN AN ORGANIZED HEALTH CARE EDUCATION/TRAINING PROGRAM

## 2025-04-04 ENCOUNTER — APPOINTMENT (OUTPATIENT)
Dept: RADIATION ONCOLOGY | Facility: HOSPITAL | Age: 80
End: 2025-04-04
Payer: MEDICARE

## 2025-04-04 PROCEDURE — 77300 RADIATION THERAPY DOSE PLAN: CPT | Performed by: RADIOLOGY

## 2025-04-04 PROCEDURE — 77293 RESPIRATOR MOTION MGMT SIMUL: CPT | Performed by: RADIOLOGY

## 2025-04-04 PROCEDURE — 77295 3-D RADIOTHERAPY PLAN: CPT | Performed by: RADIOLOGY

## 2025-04-08 ENCOUNTER — HOSPITAL ENCOUNTER (OUTPATIENT)
Dept: RADIATION ONCOLOGY | Facility: HOSPITAL | Age: 80
Setting detail: RADIATION/ONCOLOGY SERIES
Discharge: HOME | End: 2025-04-08
Payer: MEDICARE

## 2025-04-08 PROCEDURE — 77334 RADIATION TREATMENT AID(S): CPT | Performed by: RADIOLOGY

## 2025-04-08 PROCEDURE — 77280 THER RAD SIMULAJ FIELD SMPL: CPT | Performed by: RADIOLOGY

## 2025-04-09 ENCOUNTER — APPOINTMENT (OUTPATIENT)
Dept: RADIATION ONCOLOGY | Facility: HOSPITAL | Age: 80
End: 2025-04-09
Payer: MEDICARE

## 2025-04-09 ENCOUNTER — HOSPITAL ENCOUNTER (OUTPATIENT)
Dept: RADIATION ONCOLOGY | Facility: HOSPITAL | Age: 80
Setting detail: RADIATION/ONCOLOGY SERIES
Discharge: HOME | End: 2025-04-09
Payer: MEDICARE

## 2025-04-09 DIAGNOSIS — C34.11 MALIGNANT NEOPLASM OF UPPER LOBE, RIGHT BRONCHUS OR LUNG: ICD-10-CM

## 2025-04-09 DIAGNOSIS — Z51.0 ENCOUNTER FOR ANTINEOPLASTIC RADIATION THERAPY: ICD-10-CM

## 2025-04-09 LAB
RAD ONC MSQ ACTUAL FRACTIONS DELIVERED: 1
RAD ONC MSQ ACTUAL SESSION BIOLOGICAL DOSE: 400 CCGE
RAD ONC MSQ ACTUAL SESSION DELIVERED DOSE: 364 CGRAY
RAD ONC MSQ ACTUAL TOTAL BIOLOGICAL DOSE: 400 CCGE
RAD ONC MSQ ACTUAL TOTAL DOSE: 364 CGRAY
RAD ONC MSQ ELAPSED DAYS: 0
RAD ONC MSQ LAST DATE: NORMAL
RAD ONC MSQ PRESCRIBED BIOLOGICAL FRACTIONAL DOSE: 400 CCGE
RAD ONC MSQ PRESCRIBED BIOLOGICAL TOTAL DOSE: 6000 CCGE
RAD ONC MSQ PRESCRIBED FRACTIONAL DOSE: 364 CGRAY
RAD ONC MSQ PRESCRIBED NUMBER OF FRACTIONS: 15
RAD ONC MSQ PRESCRIBED TECHNIQUE: NORMAL
RAD ONC MSQ PRESCRIBED TOTAL DOSE: 5455 CGRAY
RAD ONC MSQ PRESCRIPTION PATTERN COMMENT: NORMAL
RAD ONC MSQ START DATE: NORMAL
RAD ONC MSQ TREATMENT COURSE NUMBER: 2
RAD ONC MSQ TREATMENT SITE: NORMAL

## 2025-04-09 PROCEDURE — 77523 PROTON TRMT INTERMEDIATE: CPT | Performed by: RADIOLOGY

## 2025-04-09 PROCEDURE — 77387 GUIDANCE FOR RADJ TX DLVR: CPT | Performed by: RADIOLOGY

## 2025-04-10 ENCOUNTER — HOSPITAL ENCOUNTER (OUTPATIENT)
Dept: RADIATION ONCOLOGY | Facility: HOSPITAL | Age: 80
Setting detail: RADIATION/ONCOLOGY SERIES
Discharge: HOME | End: 2025-04-10
Payer: MEDICARE

## 2025-04-10 DIAGNOSIS — J44.9 CHRONIC OBSTRUCTIVE PULMONARY DISEASE, UNSPECIFIED COPD TYPE (MULTI): ICD-10-CM

## 2025-04-10 DIAGNOSIS — C34.11 MALIGNANT NEOPLASM OF UPPER LOBE, RIGHT BRONCHUS OR LUNG: ICD-10-CM

## 2025-04-10 DIAGNOSIS — Z51.0 ENCOUNTER FOR ANTINEOPLASTIC RADIATION THERAPY: ICD-10-CM

## 2025-04-10 LAB
RAD ONC MSQ ACTUAL FRACTIONS DELIVERED: 2
RAD ONC MSQ ACTUAL SESSION BIOLOGICAL DOSE: 400 CCGE
RAD ONC MSQ ACTUAL SESSION DELIVERED DOSE: 364 CGRAY
RAD ONC MSQ ACTUAL TOTAL BIOLOGICAL DOSE: 801 CCGE
RAD ONC MSQ ACTUAL TOTAL DOSE: 728 CGRAY
RAD ONC MSQ ELAPSED DAYS: 1
RAD ONC MSQ LAST DATE: NORMAL
RAD ONC MSQ PRESCRIBED BIOLOGICAL FRACTIONAL DOSE: 400 CCGE
RAD ONC MSQ PRESCRIBED BIOLOGICAL TOTAL DOSE: 6000 CCGE
RAD ONC MSQ PRESCRIBED FRACTIONAL DOSE: 364 CGRAY
RAD ONC MSQ PRESCRIBED NUMBER OF FRACTIONS: 15
RAD ONC MSQ PRESCRIBED TECHNIQUE: NORMAL
RAD ONC MSQ PRESCRIBED TOTAL DOSE: 5455 CGRAY
RAD ONC MSQ PRESCRIPTION PATTERN COMMENT: NORMAL
RAD ONC MSQ START DATE: NORMAL
RAD ONC MSQ TREATMENT COURSE NUMBER: 2
RAD ONC MSQ TREATMENT SITE: NORMAL

## 2025-04-10 PROCEDURE — 77523 PROTON TRMT INTERMEDIATE: CPT | Performed by: RADIOLOGY

## 2025-04-10 PROCEDURE — 77387 GUIDANCE FOR RADJ TX DLVR: CPT | Performed by: RADIOLOGY

## 2025-04-11 ENCOUNTER — HOSPITAL ENCOUNTER (OUTPATIENT)
Dept: RADIATION ONCOLOGY | Facility: HOSPITAL | Age: 80
Setting detail: RADIATION/ONCOLOGY SERIES
Discharge: HOME | End: 2025-04-11
Payer: MEDICARE

## 2025-04-11 DIAGNOSIS — C34.11 MALIGNANT NEOPLASM OF UPPER LOBE, RIGHT BRONCHUS OR LUNG: ICD-10-CM

## 2025-04-11 DIAGNOSIS — Z51.0 ENCOUNTER FOR ANTINEOPLASTIC RADIATION THERAPY: ICD-10-CM

## 2025-04-11 LAB
RAD ONC MSQ ACTUAL FRACTIONS DELIVERED: 3
RAD ONC MSQ ACTUAL SESSION BIOLOGICAL DOSE: 400 CCGE
RAD ONC MSQ ACTUAL SESSION DELIVERED DOSE: 364 CGRAY
RAD ONC MSQ ACTUAL TOTAL BIOLOGICAL DOSE: 1201 CCGE
RAD ONC MSQ ACTUAL TOTAL DOSE: 1092 CGRAY
RAD ONC MSQ ELAPSED DAYS: 2
RAD ONC MSQ LAST DATE: NORMAL
RAD ONC MSQ PRESCRIBED BIOLOGICAL FRACTIONAL DOSE: 400 CCGE
RAD ONC MSQ PRESCRIBED BIOLOGICAL TOTAL DOSE: 6000 CCGE
RAD ONC MSQ PRESCRIBED FRACTIONAL DOSE: 364 CGRAY
RAD ONC MSQ PRESCRIBED NUMBER OF FRACTIONS: 15
RAD ONC MSQ PRESCRIBED TECHNIQUE: NORMAL
RAD ONC MSQ PRESCRIBED TOTAL DOSE: 5455 CGRAY
RAD ONC MSQ PRESCRIPTION PATTERN COMMENT: NORMAL
RAD ONC MSQ START DATE: NORMAL
RAD ONC MSQ TREATMENT COURSE NUMBER: 2
RAD ONC MSQ TREATMENT SITE: NORMAL

## 2025-04-11 PROCEDURE — 77387 GUIDANCE FOR RADJ TX DLVR: CPT | Performed by: RADIOLOGY

## 2025-04-11 PROCEDURE — 77523 PROTON TRMT INTERMEDIATE: CPT | Performed by: RADIOLOGY

## 2025-04-11 RX ORDER — BUDESONIDE, GLYCOPYRROLATE, AND FORMOTEROL FUMARATE 160; 9; 4.8 UG/1; UG/1; UG/1
2 AEROSOL, METERED RESPIRATORY (INHALATION) 2 TIMES DAILY
Qty: 10.7 G | Refills: 4 | Status: SHIPPED | OUTPATIENT
Start: 2025-04-11

## 2025-04-14 ENCOUNTER — HOSPITAL ENCOUNTER (OUTPATIENT)
Dept: RADIATION ONCOLOGY | Facility: HOSPITAL | Age: 80
Setting detail: RADIATION/ONCOLOGY SERIES
Discharge: HOME | End: 2025-04-14
Payer: MEDICARE

## 2025-04-14 VITALS
SYSTOLIC BLOOD PRESSURE: 158 MMHG | TEMPERATURE: 97.5 F | WEIGHT: 183.6 LBS | HEART RATE: 85 BPM | OXYGEN SATURATION: 94 % | DIASTOLIC BLOOD PRESSURE: 76 MMHG | RESPIRATION RATE: 24 BRPM | BODY MASS INDEX: 26.34 KG/M2

## 2025-04-14 DIAGNOSIS — R60.0 EDEMA, LEG: Primary | ICD-10-CM

## 2025-04-14 DIAGNOSIS — C34.11 MALIGNANT NEOPLASM OF UPPER LOBE, RIGHT BRONCHUS OR LUNG: ICD-10-CM

## 2025-04-14 DIAGNOSIS — Z51.0 ENCOUNTER FOR ANTINEOPLASTIC RADIATION THERAPY: ICD-10-CM

## 2025-04-14 DIAGNOSIS — R60.0 PEDAL EDEMA: ICD-10-CM

## 2025-04-14 LAB
RAD ONC MSQ ACTUAL FRACTIONS DELIVERED: 4
RAD ONC MSQ ACTUAL SESSION BIOLOGICAL DOSE: 400 CCGE
RAD ONC MSQ ACTUAL SESSION DELIVERED DOSE: 364 CGRAY
RAD ONC MSQ ACTUAL TOTAL BIOLOGICAL DOSE: 1602 CCGE
RAD ONC MSQ ACTUAL TOTAL DOSE: 1456 CGRAY
RAD ONC MSQ ELAPSED DAYS: 5
RAD ONC MSQ LAST DATE: NORMAL
RAD ONC MSQ PRESCRIBED BIOLOGICAL FRACTIONAL DOSE: 400 CCGE
RAD ONC MSQ PRESCRIBED BIOLOGICAL TOTAL DOSE: 6000 CCGE
RAD ONC MSQ PRESCRIBED FRACTIONAL DOSE: 364 CGRAY
RAD ONC MSQ PRESCRIBED NUMBER OF FRACTIONS: 15
RAD ONC MSQ PRESCRIBED TECHNIQUE: NORMAL
RAD ONC MSQ PRESCRIBED TOTAL DOSE: 5455 CGRAY
RAD ONC MSQ PRESCRIPTION PATTERN COMMENT: NORMAL
RAD ONC MSQ START DATE: NORMAL
RAD ONC MSQ TREATMENT COURSE NUMBER: 2
RAD ONC MSQ TREATMENT SITE: NORMAL

## 2025-04-14 PROCEDURE — 77387 GUIDANCE FOR RADJ TX DLVR: CPT | Performed by: RADIOLOGY

## 2025-04-14 PROCEDURE — 77336 RADIATION PHYSICS CONSULT: CPT | Performed by: RADIOLOGY

## 2025-04-14 PROCEDURE — 77523 PROTON TRMT INTERMEDIATE: CPT | Performed by: RADIOLOGY

## 2025-04-14 RX ORDER — BUDESONIDE 0.5 MG/2ML
INHALANT ORAL
COMMUNITY

## 2025-04-14 RX ORDER — HYDRALAZINE HYDROCHLORIDE 50 MG/1
TABLET, FILM COATED ORAL EVERY 8 HOURS
COMMUNITY

## 2025-04-14 RX ORDER — OXYCODONE AND ACETAMINOPHEN 5; 325 MG/1; MG/1
TABLET ORAL
COMMUNITY
Start: 2024-06-24

## 2025-04-14 RX ORDER — HYDRALAZINE HYDROCHLORIDE 25 MG/1
TABLET, FILM COATED ORAL
COMMUNITY
Start: 2024-11-27

## 2025-04-14 RX ORDER — FORMOTEROL FUMARATE 20 UG/2ML
SOLUTION RESPIRATORY (INHALATION)
COMMUNITY
Start: 2024-04-04

## 2025-04-14 ASSESSMENT — ENCOUNTER SYMPTOMS
OCCASIONAL FEELINGS OF UNSTEADINESS: 1
DEPRESSION: 0
LOSS OF SENSATION IN FEET: 0

## 2025-04-14 ASSESSMENT — PAIN SCALES - GENERAL: PAINLEVEL_OUTOF10: 0-NO PAIN

## 2025-04-15 ENCOUNTER — APPOINTMENT (OUTPATIENT)
Dept: RADIATION ONCOLOGY | Facility: HOSPITAL | Age: 80
End: 2025-04-15
Payer: MEDICARE

## 2025-04-15 ENCOUNTER — HOSPITAL ENCOUNTER (OUTPATIENT)
Dept: RADIATION ONCOLOGY | Facility: HOSPITAL | Age: 80
Setting detail: RADIATION/ONCOLOGY SERIES
Discharge: HOME | End: 2025-04-15
Payer: MEDICARE

## 2025-04-15 DIAGNOSIS — C34.11 MALIGNANT NEOPLASM OF UPPER LOBE, RIGHT BRONCHUS OR LUNG: ICD-10-CM

## 2025-04-15 DIAGNOSIS — Z51.0 ENCOUNTER FOR ANTINEOPLASTIC RADIATION THERAPY: ICD-10-CM

## 2025-04-15 LAB
RAD ONC MSQ ACTUAL FRACTIONS DELIVERED: 5
RAD ONC MSQ ACTUAL SESSION BIOLOGICAL DOSE: 400 CCGE
RAD ONC MSQ ACTUAL SESSION DELIVERED DOSE: 364 CGRAY
RAD ONC MSQ ACTUAL TOTAL BIOLOGICAL DOSE: 2002 CCGE
RAD ONC MSQ ACTUAL TOTAL DOSE: 1820 CGRAY
RAD ONC MSQ ELAPSED DAYS: 6
RAD ONC MSQ LAST DATE: NORMAL
RAD ONC MSQ PRESCRIBED BIOLOGICAL FRACTIONAL DOSE: 400 CCGE
RAD ONC MSQ PRESCRIBED BIOLOGICAL TOTAL DOSE: 6000 CCGE
RAD ONC MSQ PRESCRIBED FRACTIONAL DOSE: 364 CGRAY
RAD ONC MSQ PRESCRIBED NUMBER OF FRACTIONS: 15
RAD ONC MSQ PRESCRIBED TECHNIQUE: NORMAL
RAD ONC MSQ PRESCRIBED TOTAL DOSE: 5455 CGRAY
RAD ONC MSQ PRESCRIPTION PATTERN COMMENT: NORMAL
RAD ONC MSQ START DATE: NORMAL
RAD ONC MSQ TREATMENT COURSE NUMBER: 2
RAD ONC MSQ TREATMENT SITE: NORMAL

## 2025-04-15 PROCEDURE — 77523 PROTON TRMT INTERMEDIATE: CPT | Performed by: RADIOLOGY

## 2025-04-15 PROCEDURE — 77387 GUIDANCE FOR RADJ TX DLVR: CPT | Performed by: RADIOLOGY

## 2025-04-15 NOTE — PROGRESS NOTES
"Radiation Oncology On Treatment Visit    Patient Name:  Nadine Smith  MRN:  [id number]  :  1945    Referring Provider: Thai Jordan MD  Primary Care Provider: Loree Munoz MD  Care Team:   Patient Care Team:  Loree Munoz MD as PCP - General  Fernando Encarnacion MD as Consulting Physician (Hematology and Oncology)  Thai Jordan MD as Consulting Physician (Hematology and Oncology)    Date of Service: 2025     Diagnosis:   Specialty Problems    None    Treatment Summary:  Proton Beam: Right Lung or bronchus    Treatment Period Technique Fraction Dose Fractions Total Dose   Course 2 2025-2025  (days elapsed: 5)         RUL 2025-2025 Protons 364 / 364 cGy 4 / 15 1456/ 5,455 cGy     SUBJECTIVE: Tolerating radiation well. No worsening symptoms since starting radiation.  Has persistent shortness of breath that has increased in the last few months. He missed his appt with pulmonology last week. This hasn't been rescheduled.  Pedal edema has also persisted.  He hasn't had PCP visit recently.      OBJECTIVE:   Vital Signs:  /76   Pulse 85   Temp 36.4 °C (97.5 °F)   Resp 24   Wt 83.3 kg (183 lb 9.6 oz)   SpO2 94%   BMI 26.34 kg/m²     Other Pertinent Findings: Sitting comfortably on wheelchair. Alert, oriented. Breathing well on room air, no wheezing. Pedal edema bilateral, symmetric. No calf tenderness.    Toxicity Assessment          2025    14:48   Toxicity Assessment   Treatment Site Thoracic   Anorexia Grade 0   Anxiety Grade 0   Dehydration Grade 0   Depression Grade 0   Dermatitis Radiation Grade 0   Diarrhea Grade 0   Fatigue Grade 1   Nausea Grade 0   Pain Grade 0   Tumor Pain Grade 0   Vomiting Grade 0   Constipation Grade 0   Dyspepsia Grade 0   Dysphagia Grade 0   Aspiration Grade 0   Hoarseness Grade 0   Bronchial Obstruction Grade 0   Cough Grade 0   Dyspnea Grade 3       gets winded just \"tying shoes\"   Epistaxis Grade 0   Hiccups Grade 0 "   Hypoxia Grade 2       94%at rest          Assessment / Plan:  Dosimetry/IGRT reviewed.  The patient is tolerating radiation therapy as anticipated.  Continue per current treatment plan.   I have sent in basket message to Pulmonology for follow up of his pulmonary symptoms and to his PCP for further assessment of his persistent/increased pedal edema and high blood pressure.  Doppler ordered for DVT assessment. Last assessment was in Oct 2024.          Deep Lee MD, MMM  Senior Attending Physician, [facility] [place]  Professor, [employer]   Our [place]: “To Heal, To Teach, To Discover.”  RN partner: [phone]/ [email or web address]    Phone (scheduling): [phone]/[email or web address]  Proton Therapy (scheduling): [phone]/ [email or web address]  Phone (after hours): [phone]

## 2025-04-16 ENCOUNTER — HOSPITAL ENCOUNTER (OUTPATIENT)
Dept: RADIATION ONCOLOGY | Facility: HOSPITAL | Age: 80
Setting detail: RADIATION/ONCOLOGY SERIES
Discharge: HOME | End: 2025-04-16
Payer: MEDICARE

## 2025-04-16 DIAGNOSIS — Z51.0 ENCOUNTER FOR ANTINEOPLASTIC RADIATION THERAPY: ICD-10-CM

## 2025-04-16 DIAGNOSIS — C34.11 MALIGNANT NEOPLASM OF UPPER LOBE, RIGHT BRONCHUS OR LUNG: ICD-10-CM

## 2025-04-16 LAB
RAD ONC MSQ ACTUAL FRACTIONS DELIVERED: 6
RAD ONC MSQ ACTUAL SESSION BIOLOGICAL DOSE: 400 CCGE
RAD ONC MSQ ACTUAL SESSION DELIVERED DOSE: 364 CGRAY
RAD ONC MSQ ACTUAL TOTAL BIOLOGICAL DOSE: 2402 CCGE
RAD ONC MSQ ACTUAL TOTAL DOSE: 2184 CGRAY
RAD ONC MSQ ELAPSED DAYS: 7
RAD ONC MSQ LAST DATE: NORMAL
RAD ONC MSQ PRESCRIBED BIOLOGICAL FRACTIONAL DOSE: 400 CCGE
RAD ONC MSQ PRESCRIBED BIOLOGICAL TOTAL DOSE: 6000 CCGE
RAD ONC MSQ PRESCRIBED FRACTIONAL DOSE: 364 CGRAY
RAD ONC MSQ PRESCRIBED NUMBER OF FRACTIONS: 15
RAD ONC MSQ PRESCRIBED TECHNIQUE: NORMAL
RAD ONC MSQ PRESCRIBED TOTAL DOSE: 5455 CGRAY
RAD ONC MSQ PRESCRIPTION PATTERN COMMENT: NORMAL
RAD ONC MSQ START DATE: NORMAL
RAD ONC MSQ TREATMENT COURSE NUMBER: 2
RAD ONC MSQ TREATMENT SITE: NORMAL

## 2025-04-16 PROCEDURE — 77523 PROTON TRMT INTERMEDIATE: CPT | Performed by: RADIOLOGY

## 2025-04-16 PROCEDURE — 77387 GUIDANCE FOR RADJ TX DLVR: CPT | Performed by: RADIOLOGY

## 2025-04-17 ENCOUNTER — HOSPITAL ENCOUNTER (OUTPATIENT)
Dept: RADIATION ONCOLOGY | Facility: HOSPITAL | Age: 80
Setting detail: RADIATION/ONCOLOGY SERIES
Discharge: HOME | End: 2025-04-17
Payer: MEDICARE

## 2025-04-17 DIAGNOSIS — Z51.0 ENCOUNTER FOR ANTINEOPLASTIC RADIATION THERAPY: ICD-10-CM

## 2025-04-17 DIAGNOSIS — C34.11 MALIGNANT NEOPLASM OF UPPER LOBE, RIGHT BRONCHUS OR LUNG: ICD-10-CM

## 2025-04-17 LAB
RAD ONC MSQ ACTUAL FRACTIONS DELIVERED: 7
RAD ONC MSQ ACTUAL SESSION BIOLOGICAL DOSE: 400 CCGE
RAD ONC MSQ ACTUAL SESSION DELIVERED DOSE: 364 CGRAY
RAD ONC MSQ ACTUAL TOTAL BIOLOGICAL DOSE: 2803 CCGE
RAD ONC MSQ ACTUAL TOTAL DOSE: 2548 CGRAY
RAD ONC MSQ ELAPSED DAYS: 8
RAD ONC MSQ LAST DATE: NORMAL
RAD ONC MSQ PRESCRIBED BIOLOGICAL FRACTIONAL DOSE: 400 CCGE
RAD ONC MSQ PRESCRIBED BIOLOGICAL TOTAL DOSE: 6000 CCGE
RAD ONC MSQ PRESCRIBED FRACTIONAL DOSE: 364 CGRAY
RAD ONC MSQ PRESCRIBED NUMBER OF FRACTIONS: 15
RAD ONC MSQ PRESCRIBED TECHNIQUE: NORMAL
RAD ONC MSQ PRESCRIBED TOTAL DOSE: 5455 CGRAY
RAD ONC MSQ PRESCRIPTION PATTERN COMMENT: NORMAL
RAD ONC MSQ START DATE: NORMAL
RAD ONC MSQ TREATMENT COURSE NUMBER: 2
RAD ONC MSQ TREATMENT SITE: NORMAL

## 2025-04-17 PROCEDURE — 77387 GUIDANCE FOR RADJ TX DLVR: CPT | Performed by: RADIOLOGY

## 2025-04-17 PROCEDURE — 77523 PROTON TRMT INTERMEDIATE: CPT | Performed by: RADIOLOGY

## 2025-04-18 ENCOUNTER — HOSPITAL ENCOUNTER (OUTPATIENT)
Dept: RADIATION ONCOLOGY | Facility: HOSPITAL | Age: 80
Setting detail: RADIATION/ONCOLOGY SERIES
Discharge: HOME | End: 2025-04-18
Payer: MEDICARE

## 2025-04-18 ENCOUNTER — HOSPITAL ENCOUNTER (OUTPATIENT)
Dept: VASCULAR MEDICINE | Facility: HOSPITAL | Age: 80
Discharge: HOME | End: 2025-04-18
Payer: MEDICARE

## 2025-04-18 DIAGNOSIS — Z51.0 ENCOUNTER FOR ANTINEOPLASTIC RADIATION THERAPY: ICD-10-CM

## 2025-04-18 DIAGNOSIS — R60.0 EDEMA, LEG: ICD-10-CM

## 2025-04-18 DIAGNOSIS — C34.11 MALIGNANT NEOPLASM OF UPPER LOBE, RIGHT BRONCHUS OR LUNG: ICD-10-CM

## 2025-04-18 LAB
RAD ONC MSQ ACTUAL FRACTIONS DELIVERED: 8
RAD ONC MSQ ACTUAL SESSION BIOLOGICAL DOSE: 400 CCGE
RAD ONC MSQ ACTUAL SESSION DELIVERED DOSE: 364 CGRAY
RAD ONC MSQ ACTUAL TOTAL BIOLOGICAL DOSE: 3203 CCGE
RAD ONC MSQ ACTUAL TOTAL DOSE: 2912 CGRAY
RAD ONC MSQ ELAPSED DAYS: 9
RAD ONC MSQ LAST DATE: NORMAL
RAD ONC MSQ PRESCRIBED BIOLOGICAL FRACTIONAL DOSE: 400 CCGE
RAD ONC MSQ PRESCRIBED BIOLOGICAL TOTAL DOSE: 6000 CCGE
RAD ONC MSQ PRESCRIBED FRACTIONAL DOSE: 364 CGRAY
RAD ONC MSQ PRESCRIBED NUMBER OF FRACTIONS: 15
RAD ONC MSQ PRESCRIBED TECHNIQUE: NORMAL
RAD ONC MSQ PRESCRIBED TOTAL DOSE: 5455 CGRAY
RAD ONC MSQ PRESCRIPTION PATTERN COMMENT: NORMAL
RAD ONC MSQ START DATE: NORMAL
RAD ONC MSQ TREATMENT COURSE NUMBER: 2
RAD ONC MSQ TREATMENT SITE: NORMAL

## 2025-04-18 PROCEDURE — 77523 PROTON TRMT INTERMEDIATE: CPT | Performed by: RADIOLOGY

## 2025-04-18 PROCEDURE — 93970 EXTREMITY STUDY: CPT | Performed by: INTERNAL MEDICINE

## 2025-04-18 PROCEDURE — 77387 GUIDANCE FOR RADJ TX DLVR: CPT | Performed by: RADIOLOGY

## 2025-04-18 PROCEDURE — 93970 EXTREMITY STUDY: CPT

## 2025-04-18 PROCEDURE — 77336 RADIATION PHYSICS CONSULT: CPT | Performed by: RADIOLOGY

## 2025-04-21 ENCOUNTER — TELEPHONE (OUTPATIENT)
Dept: RADIATION ONCOLOGY | Facility: HOSPITAL | Age: 80
End: 2025-04-21
Payer: MEDICARE

## 2025-04-21 ENCOUNTER — HOSPITAL ENCOUNTER (OUTPATIENT)
Dept: RADIATION ONCOLOGY | Facility: HOSPITAL | Age: 80
Setting detail: RADIATION/ONCOLOGY SERIES
End: 2025-04-21
Payer: MEDICARE

## 2025-04-21 ENCOUNTER — DOCUMENTATION (OUTPATIENT)
Dept: RADIATION ONCOLOGY | Facility: HOSPITAL | Age: 80
End: 2025-04-21
Payer: MEDICARE

## 2025-04-21 ENCOUNTER — HOSPITAL ENCOUNTER (INPATIENT)
Facility: HOSPITAL | Age: 80
LOS: 2 days | Discharge: HOME | DRG: 378 | End: 2025-04-23
Attending: STUDENT IN AN ORGANIZED HEALTH CARE EDUCATION/TRAINING PROGRAM | Admitting: INTERNAL MEDICINE
Payer: MEDICARE

## 2025-04-21 DIAGNOSIS — K92.2 ACUTE LOWER GI BLEEDING: Primary | ICD-10-CM

## 2025-04-21 LAB
ABO GROUP (TYPE) IN BLOOD: NORMAL
ALBUMIN SERPL BCP-MCNC: 3.6 G/DL (ref 3.4–5)
ALP SERPL-CCNC: 38 U/L (ref 33–136)
ALT SERPL W P-5'-P-CCNC: 7 U/L (ref 7–52)
ANION GAP SERPL CALC-SCNC: 13 MMOL/L (ref 10–20)
ANTIBODY SCREEN: NORMAL
AST SERPL W P-5'-P-CCNC: 13 U/L (ref 9–39)
BASOPHILS # BLD AUTO: 0.03 X10*3/UL (ref 0–0.1)
BASOPHILS NFR BLD AUTO: 0.4 %
BILIRUB SERPL-MCNC: 0.7 MG/DL (ref 0–1.2)
BUN SERPL-MCNC: 48 MG/DL (ref 6–23)
CALCIUM SERPL-MCNC: 8.3 MG/DL (ref 8.6–10.6)
CHLORIDE SERPL-SCNC: 111 MMOL/L (ref 98–107)
CO2 SERPL-SCNC: 22 MMOL/L (ref 21–32)
CREAT SERPL-MCNC: 1.06 MG/DL (ref 0.5–1.3)
EGFRCR SERPLBLD CKD-EPI 2021: 53 ML/MIN/1.73M*2
EOSINOPHIL # BLD AUTO: 0 X10*3/UL (ref 0–0.4)
EOSINOPHIL NFR BLD AUTO: 0 %
ERYTHROCYTE [DISTWIDTH] IN BLOOD BY AUTOMATED COUNT: 15.2 % (ref 11.5–14.5)
GLUCOSE SERPL-MCNC: 82 MG/DL (ref 74–99)
HCT VFR BLD AUTO: 19.7 % (ref 36–52)
HGB BLD-MCNC: 6.5 G/DL (ref 12–17.5)
IMM GRANULOCYTES # BLD AUTO: 0.04 X10*3/UL (ref 0–0.5)
IMM GRANULOCYTES NFR BLD AUTO: 0.5 % (ref 0–0.9)
INR PPP: 1.2 (ref 0.9–1.1)
LIPASE SERPL-CCNC: 9 U/L (ref 9–82)
LYMPHOCYTES # BLD AUTO: 1.18 X10*3/UL (ref 0.8–3)
LYMPHOCYTES NFR BLD AUTO: 15.7 %
MAGNESIUM SERPL-MCNC: 2.28 MG/DL (ref 1.6–2.4)
MCH RBC QN AUTO: 24.9 PG (ref 26–34)
MCHC RBC AUTO-ENTMCNC: 33 G/DL (ref 32–36)
MCV RBC AUTO: 76 FL (ref 80–100)
MONOCYTES # BLD AUTO: 0.59 X10*3/UL (ref 0.05–0.8)
MONOCYTES NFR BLD AUTO: 7.9 %
NEUTROPHILS # BLD AUTO: 5.67 X10*3/UL (ref 1.6–5.5)
NEUTROPHILS NFR BLD AUTO: 75.5 %
NRBC BLD-RTO: 0 /100 WBCS (ref 0–0)
PLATELET # BLD AUTO: 149 X10*3/UL (ref 150–450)
POTASSIUM SERPL-SCNC: 4 MMOL/L (ref 3.5–5.3)
PROT SERPL-MCNC: 6.4 G/DL (ref 6.4–8.2)
PROTHROMBIN TIME: 12.8 SECONDS (ref 9.8–12.4)
RBC # BLD AUTO: 2.61 X10*6/UL (ref 4–5.9)
RH FACTOR (ANTIGEN D): NORMAL
SODIUM SERPL-SCNC: 142 MMOL/L (ref 136–145)
WBC # BLD AUTO: 7.5 X10*3/UL (ref 4.4–11.3)

## 2025-04-21 PROCEDURE — 85610 PROTHROMBIN TIME: CPT | Performed by: STUDENT IN AN ORGANIZED HEALTH CARE EDUCATION/TRAINING PROGRAM

## 2025-04-21 PROCEDURE — 99285 EMERGENCY DEPT VISIT HI MDM: CPT | Mod: 25 | Performed by: STUDENT IN AN ORGANIZED HEALTH CARE EDUCATION/TRAINING PROGRAM

## 2025-04-21 PROCEDURE — 86901 BLOOD TYPING SEROLOGIC RH(D): CPT | Performed by: STUDENT IN AN ORGANIZED HEALTH CARE EDUCATION/TRAINING PROGRAM

## 2025-04-21 PROCEDURE — 36430 TRANSFUSION BLD/BLD COMPNT: CPT

## 2025-04-21 PROCEDURE — 99222 1ST HOSP IP/OBS MODERATE 55: CPT | Performed by: STUDENT IN AN ORGANIZED HEALTH CARE EDUCATION/TRAINING PROGRAM

## 2025-04-21 PROCEDURE — 1200000003 HC ONCOLOGY  ROOM WITH TELEMETRY DAILY

## 2025-04-21 PROCEDURE — 36415 COLL VENOUS BLD VENIPUNCTURE: CPT | Performed by: STUDENT IN AN ORGANIZED HEALTH CARE EDUCATION/TRAINING PROGRAM

## 2025-04-21 PROCEDURE — 93010 ELECTROCARDIOGRAM REPORT: CPT | Performed by: INTERNAL MEDICINE

## 2025-04-21 PROCEDURE — 86923 COMPATIBILITY TEST ELECTRIC: CPT

## 2025-04-21 PROCEDURE — 2500000004 HC RX 250 GENERAL PHARMACY W/ HCPCS (ALT 636 FOR OP/ED): Mod: JZ

## 2025-04-21 PROCEDURE — 83735 ASSAY OF MAGNESIUM: CPT

## 2025-04-21 PROCEDURE — 83690 ASSAY OF LIPASE: CPT | Performed by: STUDENT IN AN ORGANIZED HEALTH CARE EDUCATION/TRAINING PROGRAM

## 2025-04-21 PROCEDURE — P9040 RBC LEUKOREDUCED IRRADIATED: HCPCS

## 2025-04-21 PROCEDURE — 80053 COMPREHEN METABOLIC PANEL: CPT | Performed by: STUDENT IN AN ORGANIZED HEALTH CARE EDUCATION/TRAINING PROGRAM

## 2025-04-21 PROCEDURE — 99285 EMERGENCY DEPT VISIT HI MDM: CPT | Performed by: EMERGENCY MEDICINE

## 2025-04-21 PROCEDURE — 85025 COMPLETE CBC W/AUTO DIFF WBC: CPT | Performed by: STUDENT IN AN ORGANIZED HEALTH CARE EDUCATION/TRAINING PROGRAM

## 2025-04-21 RX ORDER — POLYETHYLENE GLYCOL 3350, SODIUM CHLORIDE, SODIUM BICARBONATE, POTASSIUM CHLORIDE 420; 11.2; 5.72; 1.48 G/4L; G/4L; G/4L; G/4L
4000 POWDER, FOR SOLUTION ORAL ONCE
Status: COMPLETED | OUTPATIENT
Start: 2025-04-21 | End: 2025-04-22

## 2025-04-21 RX ORDER — ALBUTEROL SULFATE 90 UG/1
2 INHALANT RESPIRATORY (INHALATION) EVERY 4 HOURS PRN
Status: DISCONTINUED | OUTPATIENT
Start: 2025-04-21 | End: 2025-04-23 | Stop reason: HOSPADM

## 2025-04-21 RX ORDER — TAMSULOSIN HYDROCHLORIDE 0.4 MG/1
0.4 CAPSULE ORAL 2 TIMES DAILY
Status: DISCONTINUED | OUTPATIENT
Start: 2025-04-21 | End: 2025-04-23 | Stop reason: HOSPADM

## 2025-04-21 RX ORDER — ALBUTEROL SULFATE 0.83 MG/ML
2.5 SOLUTION RESPIRATORY (INHALATION) 4 TIMES DAILY PRN
Status: DISCONTINUED | OUTPATIENT
Start: 2025-04-21 | End: 2025-04-23 | Stop reason: HOSPADM

## 2025-04-21 RX ORDER — PANTOPRAZOLE SODIUM 40 MG/10ML
40 INJECTION, POWDER, LYOPHILIZED, FOR SOLUTION INTRAVENOUS 2 TIMES DAILY
Status: DISCONTINUED | OUTPATIENT
Start: 2025-04-21 | End: 2025-04-23 | Stop reason: HOSPADM

## 2025-04-21 RX ORDER — POLYETHYLENE GLYCOL 3350, SODIUM CHLORIDE, SODIUM BICARBONATE, POTASSIUM CHLORIDE 420; 11.2; 5.72; 1.48 G/4L; G/4L; G/4L; G/4L
4000 POWDER, FOR SOLUTION ORAL ONCE
Status: DISCONTINUED | OUTPATIENT
Start: 2025-04-21 | End: 2025-04-21

## 2025-04-21 RX ORDER — FINASTERIDE 5 MG/1
5 TABLET, FILM COATED ORAL DAILY
Status: DISCONTINUED | OUTPATIENT
Start: 2025-04-22 | End: 2025-04-23 | Stop reason: HOSPADM

## 2025-04-21 RX ORDER — BUDESONIDE 0.5 MG/2ML
0.5 INHALANT ORAL
Status: DISCONTINUED | OUTPATIENT
Start: 2025-04-21 | End: 2025-04-23

## 2025-04-21 RX ORDER — FLUTICASONE FUROATE AND VILANTEROL 100; 25 UG/1; UG/1
1 POWDER RESPIRATORY (INHALATION)
Status: DISCONTINUED | OUTPATIENT
Start: 2025-04-21 | End: 2025-04-23 | Stop reason: HOSPADM

## 2025-04-21 RX ORDER — POLYETHYLENE GLYCOL 3350, SODIUM CHLORIDE, SODIUM BICARBONATE, POTASSIUM CHLORIDE 420; 11.2; 5.72; 1.48 G/4L; G/4L; G/4L; G/4L
2000 POWDER, FOR SOLUTION ORAL ONCE AS NEEDED
Status: DISCONTINUED | OUTPATIENT
Start: 2025-04-21 | End: 2025-04-22

## 2025-04-21 RX ORDER — ASPIRIN 81 MG/1
81 TABLET ORAL DAILY
Status: DISCONTINUED | OUTPATIENT
Start: 2025-04-21 | End: 2025-04-23

## 2025-04-21 RX ADMIN — PANTOPRAZOLE SODIUM 40 MG: 40 INJECTION, POWDER, FOR SOLUTION INTRAVENOUS at 22:16

## 2025-04-21 SDOH — ECONOMIC STABILITY: INCOME INSECURITY: IN THE PAST 12 MONTHS HAS THE ELECTRIC, GAS, OIL, OR WATER COMPANY THREATENED TO SHUT OFF SERVICES IN YOUR HOME?: NO

## 2025-04-21 SDOH — ECONOMIC STABILITY: HOUSING INSECURITY: IN THE LAST 12 MONTHS, WAS THERE A TIME WHEN YOU WERE NOT ABLE TO PAY THE MORTGAGE OR RENT ON TIME?: NO

## 2025-04-21 SDOH — SOCIAL STABILITY: SOCIAL INSECURITY
WITHIN THE LAST YEAR, HAVE YOU BEEN KICKED, HIT, SLAPPED, OR OTHERWISE PHYSICALLY HURT BY YOUR PARTNER OR EX-PARTNER?: NO

## 2025-04-21 SDOH — SOCIAL STABILITY: SOCIAL INSECURITY: ARE YOU MARRIED, WIDOWED, DIVORCED, SEPARATED, NEVER MARRIED, OR LIVING WITH A PARTNER?: MARRIED

## 2025-04-21 SDOH — HEALTH STABILITY: MENTAL HEALTH
DO YOU FEEL STRESS - TENSE, RESTLESS, NERVOUS, OR ANXIOUS, OR UNABLE TO SLEEP AT NIGHT BECAUSE YOUR MIND IS TROUBLED ALL THE TIME - THESE DAYS?: TO SOME EXTENT

## 2025-04-21 SDOH — HEALTH STABILITY: PHYSICAL HEALTH: ON AVERAGE, HOW MANY MINUTES DO YOU ENGAGE IN EXERCISE AT THIS LEVEL?: 30 MIN

## 2025-04-21 SDOH — SOCIAL STABILITY: SOCIAL NETWORK: HOW OFTEN DO YOU ATTEND MEETINGS OF THE CLUBS OR ORGANIZATIONS YOU BELONG TO?: MORE THAN 4 TIMES PER YEAR

## 2025-04-21 SDOH — SOCIAL STABILITY: SOCIAL INSECURITY: WITHIN THE LAST YEAR, HAVE YOU BEEN HUMILIATED OR EMOTIONALLY ABUSED IN OTHER WAYS BY YOUR PARTNER OR EX-PARTNER?: NO

## 2025-04-21 SDOH — ECONOMIC STABILITY: FOOD INSECURITY: WITHIN THE PAST 12 MONTHS, THE FOOD YOU BOUGHT JUST DIDN'T LAST AND YOU DIDN'T HAVE MONEY TO GET MORE.: NEVER TRUE

## 2025-04-21 SDOH — HEALTH STABILITY: PHYSICAL HEALTH
HOW OFTEN DO YOU NEED TO HAVE SOMEONE HELP YOU WHEN YOU READ INSTRUCTIONS, PAMPHLETS, OR OTHER WRITTEN MATERIAL FROM YOUR DOCTOR OR PHARMACY?: NEVER

## 2025-04-21 SDOH — SOCIAL STABILITY: SOCIAL INSECURITY: DO YOU FEEL ANYONE HAS EXPLOITED OR TAKEN ADVANTAGE OF YOU FINANCIALLY OR OF YOUR PERSONAL PROPERTY?: NO

## 2025-04-21 SDOH — SOCIAL STABILITY: SOCIAL INSECURITY: WITHIN THE LAST YEAR, HAVE YOU BEEN AFRAID OF YOUR PARTNER OR EX-PARTNER?: NO

## 2025-04-21 SDOH — SOCIAL STABILITY: SOCIAL NETWORK
DO YOU BELONG TO ANY CLUBS OR ORGANIZATIONS SUCH AS CHURCH GROUPS, UNIONS, FRATERNAL OR ATHLETIC GROUPS, OR SCHOOL GROUPS?: YES

## 2025-04-21 SDOH — HEALTH STABILITY: PHYSICAL HEALTH: ON AVERAGE, HOW MANY DAYS PER WEEK DO YOU ENGAGE IN MODERATE TO STRENUOUS EXERCISE (LIKE A BRISK WALK)?: 1 DAY

## 2025-04-21 SDOH — SOCIAL STABILITY: SOCIAL NETWORK: HOW OFTEN DO YOU ATTEND CHURCH OR RELIGIOUS SERVICES?: NEVER

## 2025-04-21 SDOH — SOCIAL STABILITY: SOCIAL INSECURITY: HAVE YOU HAD THOUGHTS OF HARMING ANYONE ELSE?: NO

## 2025-04-21 SDOH — SOCIAL STABILITY: SOCIAL INSECURITY
WITHIN THE LAST YEAR, HAVE YOU BEEN RAPED OR FORCED TO HAVE ANY KIND OF SEXUAL ACTIVITY BY YOUR PARTNER OR EX-PARTNER?: NO

## 2025-04-21 SDOH — ECONOMIC STABILITY: HOUSING INSECURITY: AT ANY TIME IN THE PAST 12 MONTHS, WERE YOU HOMELESS OR LIVING IN A SHELTER (INCLUDING NOW)?: NO

## 2025-04-21 SDOH — ECONOMIC STABILITY: FOOD INSECURITY: WITHIN THE PAST 12 MONTHS, YOU WORRIED THAT YOUR FOOD WOULD RUN OUT BEFORE YOU GOT THE MONEY TO BUY MORE.: NEVER TRUE

## 2025-04-21 SDOH — SOCIAL STABILITY: SOCIAL NETWORK: HOW OFTEN DO YOU GET TOGETHER WITH FRIENDS OR RELATIVES?: THREE TIMES A WEEK

## 2025-04-21 SDOH — SOCIAL STABILITY: SOCIAL INSECURITY: HAS ANYONE EVER THREATENED TO HURT YOUR FAMILY OR YOUR PETS?: NO

## 2025-04-21 SDOH — SOCIAL STABILITY: SOCIAL INSECURITY: ABUSE: ADULT

## 2025-04-21 SDOH — SOCIAL STABILITY: SOCIAL INSECURITY: HAVE YOU HAD ANY THOUGHTS OF HARMING ANYONE ELSE?: NO

## 2025-04-21 SDOH — SOCIAL STABILITY: SOCIAL NETWORK: IN A TYPICAL WEEK, HOW MANY TIMES DO YOU TALK ON THE PHONE WITH FAMILY, FRIENDS, OR NEIGHBORS?: THREE TIMES A WEEK

## 2025-04-21 SDOH — ECONOMIC STABILITY: FOOD INSECURITY: HOW HARD IS IT FOR YOU TO PAY FOR THE VERY BASICS LIKE FOOD, HOUSING, MEDICAL CARE, AND HEATING?: NOT HARD AT ALL

## 2025-04-21 SDOH — HEALTH STABILITY: MENTAL HEALTH: EXPERIENCED ANY OF THE FOLLOWING LIFE EVENTS: DEATH OF FAMILY/FRIEND

## 2025-04-21 SDOH — SOCIAL STABILITY: SOCIAL INSECURITY: DO YOU FEEL UNSAFE GOING BACK TO THE PLACE WHERE YOU ARE LIVING?: NO

## 2025-04-21 SDOH — ECONOMIC STABILITY: HOUSING INSECURITY: IN THE PAST 12 MONTHS, HOW MANY TIMES HAVE YOU MOVED WHERE YOU WERE LIVING?: 0

## 2025-04-21 SDOH — SOCIAL STABILITY: SOCIAL INSECURITY: WERE YOU ABLE TO COMPLETE ALL THE BEHAVIORAL HEALTH SCREENINGS?: YES

## 2025-04-21 SDOH — ECONOMIC STABILITY: TRANSPORTATION INSECURITY: IN THE PAST 12 MONTHS, HAS LACK OF TRANSPORTATION KEPT YOU FROM MEDICAL APPOINTMENTS OR FROM GETTING MEDICATIONS?: NO

## 2025-04-21 SDOH — SOCIAL STABILITY: SOCIAL INSECURITY: DOES ANYONE TRY TO KEEP YOU FROM HAVING/CONTACTING OTHER FRIENDS OR DOING THINGS OUTSIDE YOUR HOME?: NO

## 2025-04-21 SDOH — SOCIAL STABILITY: SOCIAL INSECURITY: ARE THERE ANY APPARENT SIGNS OF INJURIES/BEHAVIORS THAT COULD BE RELATED TO ABUSE/NEGLECT?: NO

## 2025-04-21 SDOH — SOCIAL STABILITY: SOCIAL INSECURITY: ARE YOU OR HAVE YOU BEEN THREATENED OR ABUSED PHYSICALLY, EMOTIONALLY, OR SEXUALLY BY ANYONE?: NO

## 2025-04-21 SDOH — SOCIAL STABILITY: SOCIAL INSECURITY: POSSIBLE ABUSE REPORTED TO:: ADVOCATE

## 2025-04-21 ASSESSMENT — PAIN - FUNCTIONAL ASSESSMENT
PAIN_FUNCTIONAL_ASSESSMENT: 0-10
PAIN_FUNCTIONAL_ASSESSMENT: 0-10

## 2025-04-21 ASSESSMENT — ACTIVITIES OF DAILY LIVING (ADL)
JUDGMENT_ADEQUATE_SAFELY_COMPLETE_DAILY_ACTIVITIES: YES
DRESSING YOURSELF: INDEPENDENT
ASSISTIVE_DEVICE: HEARING AID - RIGHT
ADEQUATE_TO_COMPLETE_ADL: NO
GROOMING: INDEPENDENT
BATHING: INDEPENDENT
PATIENT'S MEMORY ADEQUATE TO SAFELY COMPLETE DAILY ACTIVITIES?: YES
TOILETING: INDEPENDENT
WALKS IN HOME: INDEPENDENT
HEARING - LEFT EAR: HEARING AID
HEARING - RIGHT EAR: HEARING AID
LACK_OF_TRANSPORTATION: NO
LACK_OF_TRANSPORTATION: NO
FEEDING YOURSELF: INDEPENDENT
LACK_OF_TRANSPORTATION: NO

## 2025-04-21 ASSESSMENT — COGNITIVE AND FUNCTIONAL STATUS - GENERAL
MOVING TO AND FROM BED TO CHAIR: A LITTLE
DAILY ACTIVITIY SCORE: 24
STANDING UP FROM CHAIR USING ARMS: A LITTLE
CLIMB 3 TO 5 STEPS WITH RAILING: A LITTLE
PATIENT BASELINE BEDBOUND: NO
MOBILITY SCORE: 20
WALKING IN HOSPITAL ROOM: A LITTLE

## 2025-04-21 ASSESSMENT — PATIENT HEALTH QUESTIONNAIRE - PHQ9
2. FEELING DOWN, DEPRESSED OR HOPELESS: NOT AT ALL
1. LITTLE INTEREST OR PLEASURE IN DOING THINGS: NOT AT ALL
SUM OF ALL RESPONSES TO PHQ9 QUESTIONS 1 & 2: 0

## 2025-04-21 ASSESSMENT — PAIN SCALES - GENERAL
PAINLEVEL_OUTOF10: 0 - NO PAIN

## 2025-04-21 ASSESSMENT — LIFESTYLE VARIABLES
SKIP TO QUESTIONS 9-10: 1
PRESCIPTION_ABUSE_PAST_12_MONTHS: NO
AUDIT-C TOTAL SCORE: 0
HOW OFTEN DO YOU HAVE 6 OR MORE DRINKS ON ONE OCCASION: NEVER
HOW OFTEN DO YOU HAVE A DRINK CONTAINING ALCOHOL: NEVER
HOW MANY STANDARD DRINKS CONTAINING ALCOHOL DO YOU HAVE ON A TYPICAL DAY: PATIENT DOES NOT DRINK
SUBSTANCE_ABUSE_PAST_12_MONTHS: NO
AUDIT-C TOTAL SCORE: 0

## 2025-04-21 NOTE — ED PROVIDER NOTES
Emergency Department Provider Note          History of Present Illness     CC: Black or Bloody Stool     History provided by: Patient  Limitations to History: None    HPI:   Nadine Smith is a 80 y.o.adult with PMH COPD, Adenocarcinoma of the lung, GERD, HTN, BPH presenting to the Emergency Department for GI bleeding.        Records Reviewed: Recent available ED and inpatient notes reviewed in EMR.    PMHx/PSHx:  Per HPI.   - has a past medical history of Bilateral inguinal hernia, BPH (benign prostatic hyperplasia), Cataract, COPD (chronic obstructive pulmonary disease) (Multi), COVID-19, Disorder of prostate, unspecified, Hypertension, NSCLC of right lung (Multi), Pulmonary nodule, Snoring, and TIA (transient ischemic attack).  - has a past surgical history that includes CT angio head w and wo IV contrast (05/18/2017); CT angio neck (05/18/2017); Colonoscopy; and Bronchoscopy.  - has Asymmetrical sensorineural hearing loss; Combined form of age-related cataract, both eyes; Dizziness; Edema, leg; Hemoptysis; Hyperopia with astigmatism and presbyopia; Hypertensive retinopathy, grade 1; Lung mass; Meibomian gland dysfunction (MGD); Non-small cell lung cancer (Multi); Subconjunctival hemorrhage of right eye; TIA (transient ischemic attack); Chronic obstructive pulmonary disease (Multi); Inguinal hernia of right side without obstruction or gangrene; Lung nodules; and Non-small cell cancer of right lung (Multi) on their problem list.    Medications:  Current Outpatient Medications   Medication Instructions    albuterol (Ventolin HFA) 90 mcg/actuation inhaler 2 puffs, inhalation, Every 4 hours PRN    albuterol 2.5 mg, nebulization, 4 times daily PRN    amLODIPine (Norvasc) 10 mg tablet 1 tablet, Daily    aspirin 81 mg EC tablet 1 tablet, Daily    Breztri Aerosphere 160-9-4.8 mcg/actuation HFA aerosol inhaler 2 puffs, inhalation, 2 times daily    budesonide (Pulmicort) 0.5 mg/2 mL nebulizer solution 1 VIAL  "INHALATION TWICE A DAY 90 DAYS    finasteride (Proscar) 5 mg tablet 1 tablet, Daily    formoterol (Perforomist) 20 mcg/2 mL nebulizer solution 2 ML INHALATION TWICE A DAY 30 DAYS    hydrALAZINE (Apresoline) 25 mg tablet 1 TABLET WITH FOOD ORALLY EVERY 8 HOURS 30 DAYS    hydrALAZINE (Apresoline) 50 mg tablet Every 8 hours    oxyCODONE-acetaminophen (Percocet) 5-325 mg tablet 1 TABLET AS NEEDED ORALLY EVERY 6 HRS 7 DAYS    tamsulosin (Flomax) 0.4 mg 24 hr capsule 1 capsule, 2 times daily        Allergies:  Patient has no known allergies.    Social History:  - Tobacco:  reports that she has quit smoking. Her smoking use included cigarettes. She has never used smokeless tobacco.   - Alcohol:  reports that she does not currently use alcohol.   - Illicit Drugs:  reports no history of drug use.     ROS:  Per HPI.       Physical Exam     Triage Vitals:  T 37.5 °C (99.5 °F)  HR 87  /58  RR 16  O2 99 % None (Room air)    General: Awake, alert, in no acute distress  Eyes: Gaze conjugate.  No scleral icterus or injection  HENT: Normo-cephalic, atraumatic. No stridor  CV: {HR:13450::\"Regular\"} rate, {rhythm:72780::\"regular\"} rhythm. Radial pulses 2+ bilaterally  Resp: Breathing non-labored, speaking in full sentences.  Clear to auscultation bilaterally  GI: Soft, non-distended, non-tender. No rebound or guarding.  : ***  MSK/Extremities: No gross bony deformities. Moving all extremities  Skin: Warm. Appropriate color  Neuro: Alert. Oriented. Face symmetric. Speech is fluent.  Gross strength and sensation intact in b/l UE and LEs  Psych: Appropriate mood and affect          [place] Coma Scale Score: 15                    Medical Decision Making & ED Course     EKG: EKG interpreted by myself. If applicable, please see ED Course for full interpretation.    Medical Decision Making   Nadine Smith is a 80 y.o.adult presenting to the Emergency Department for ***             Independent Result Review and " "Interpretation: Relevant laboratory and radiographic results were reviewed and independently interpreted by myself.  As necessary, they are commented on in the ED Course.    Chronic conditions affecting the patient's care: As documented above in MDM.    Social Determinants of Health which Significantly Impact Care:   {Reviewed:72435::\"None identified\"}  {The following actions were taken to address these social determinants:62236}      Disposition   {ED Dispo:83089}    Mika Wei MD  Emergency Medicine PGY3      Procedures     Procedures ? [employer] last updated 4/21/2025 1:35 PM     "

## 2025-04-21 NOTE — ED PROVIDER NOTES
Emergency Department Provider Note        History of Present Illness     History provided by: Patient  Limitations to History: None  External Records Reviewed with Brief Summary: None    HPI:  Nadine Smith is a 80 y.o. adult Past medical history significant for BPH and stage III lung cancer currently on radiation therapy presenting to the emergency department with lightheadedness and concern for bloody stools.  Patient states symptoms started around 930 last night where he had a large bowel movement of stefany bright red blood.  Has had 2 to 3 days of dark melanotic stools.  Denies any abdominal pain, hematemesis, no recent falls, is not on anticoagulation.  No recent surgery or injury, denies any fever, chills, cough or congestion, no chest pain or shortness of breath.    Physical Exam   Triage vitals:  T 37.5 °C (99.5 °F)  HR 87  /58  RR 16  O2 99 % None (Room air)    General: Awake, alert, in no acute distress  Eyes: Gaze conjugate.  No scleral icterus or injection, pale conjunctive a  HENT: Normo-cephalic, atraumatic. No stridor  CV: Regular rate, regular rhythm. Radial pulses 2+ bilaterally  Resp: Breathing non-labored, speaking in full sentences.  Clear to auscultation bilaterally  GI: Soft, non-distended, non-tender. No rebound or guarding.  Rectal: Positive blood on rectal exam, chaperone present.  MSK/Extremities: No gross bony deformities. Moving all extremities  Skin: Warm. Appropriate color  Neuro: Alert. Oriented. Face symmetric. Speech is fluent.  Gross strength and sensation intact in b/l UE and LEs  Psych: Appropriate mood and affect    Medical Decision Making & ED Course   Medical Decision Makin y.o. adult presenting to the emergency department with concern for acute GI bleed.  On physical exam, patient is hemodynamically stable and in no acute distress, does have some pale conjunctiva but is otherwise well-appearing, mentating appropriately.  His workup is significant for a  hemoglobin of 6.5 and on bedside rectal exam he does have positive blood in the rectal vault.  Unclear why patient is suddenly experiencing an acute lower GI bleed as he has never had symptoms like this before.  Consulted GI who will plan to initiate bowel prep with colonoscopy and patient will be admitted to medicine for further management and monitoring.  Patient consented for blood products and transfused 1 unit of packed red blood cells while in the emergency department.  Patient admitted to medicine in stable condition.  ----     Social Determinants of Health which Significantly Impact Care: None identified     EKG Independent Interpretation: EKG not obtained    Independent Result Review and Interpretation: Relevant laboratory and radiographic results were reviewed and independently interpreted by myself.  As necessary, they are commented on in the ED Course.    Chronic conditions affecting the patient's care: As documented above in [facility]    The patient was discussed with the following consultants/services: Hospitalist/Admitting Provider who accepted the patient for admission and GI    Care Considerations: As documented above in [facility]    ED Course:  ED Course as of 04/21/25 1659   Mon Apr 21, 2025   1620 ED Attending Documentation: This patient was seen by the resident physician.  I have seen and examined the patient, agree with the workup, evaluation, management and diagnosis. I reviewed and edited the above documentation where necessary. I have looked at the EKG and agree with the interpretation. On my evaluation of the patient: 80-year-old male who presents to the emergency department with concern for GI bleed.  Dark stools over the past few days, now with some hematochezia.  No obvious bright red blood on exam but is Hemoccult positive.  Hemoglobin is dropped from 13.2-6.5.  Will give a unit of blood, blood pressures are stable at 167/68 and patient is not tachycardic.  He will be admitted to medicine, I do not  think he needs a CTA at this time.    Donny Harris MD  EM Attending Physician   [RG]      ED Course User Index  [RG] Donny Harris MD         Diagnoses as of 04/21/25 1659   Acute lower GI bleeding     Disposition   As a result of their workup, the patient will require admission to the hospital.  The patient was informed of her diagnosis.  The patient was given the opportunity to ask questions and I answered them. The patient agreed to be admitted to the hospital.    Procedures   Procedures    Patient seen and discussed with ED attending physician.    Kandice Duncan DO  Emergency Medicine       Kandice Duncan DO  Resident  04/21/25 1718

## 2025-04-21 NOTE — TELEPHONE ENCOUNTER
Reason for Conversation  No chief complaint on file.    Background   [facility]     Disposition   No disposition on file.  Pt wife called to cancel stated he had a bowl movement and it was pure blood  stated a lot and it was all over the toilet seat  they were instructed to go to the ed and agreed

## 2025-04-21 NOTE — SIGNIFICANT EVENT
Senior staffing note    Please refer to intern's note for full H&P    Nadine Smith is a 79 yo M w/ PMHx [id number] stage Ib poorly differentiated invasive lung adenocarcinoma s/p SBRT 2023, local recurrence in RUL 2/2025 receiving RT admitted for GIB.     ED course notable for: T 99.5, /58, HR 87, on RA. Labs notable for hgb 6.5 (13.2 2 months ago), MCV 76, plt 149, CMP with BUN 48, Cr 1.08 (baseline 0.8-0.9), CoCa 9.1, lipase 9, INR 1.2. Noted to have [facility], [facility] with blood. GI consulted, pending recs.     On interview, patient states he has had hematochezia for 1 week, last night tried to have a BM and notes large amount of bright red blood. Has been having dizziness/lightheadedness. Has dyspnea at baseline. Describing stools as dark. No CP, abd pain, N/V, hematemesis, fevers, chills. No NSAID use, alcohol use.   Last c-scope 2014 with polyp (path: rectal mucosal prolapse) and diverticulosis. No prior EGD.    MEDICATIONS:  Albuterol inhaler, nebulizer  Amlodipine 10 mg   ASA 81 mg daily   Breztri inhaler  Budesonide neb  Finasteride 5 mg daily  Tamsulosin 0.4 mg bid    ALLERGIES: NKDA    PHYSICAL EXAM:  General: awake, alert, NAD  HENT: NCAT, MMM, hearing aid  Cardiac: RRR, normal S1, S2, no M/R/G  Pulm: CTAB, normal respiratory effort, on room air  Abdomen: soft, ND, NT, no involuntary guarding or rebound tenderness  EXT: 1-2+ LE edema to knees  Neuro: answering questions appropriately    ASSESSMENT & PLAN:  Nadine Smith is a 79 yo M w/ PMHx [id number], stage Ib poorly differentiated invasive lung adenocarcinoma s/p SBRT 2023, local recurrence in RUL 2/2025 receiving RT admitted for GIB, likely lower. He is HDS although with softer BP. Will transfuse 1u pRBC and provide supportive care. GI is following and planning for colonoscopy tomorrow.     #GIB  -Bowel prep tonight per GI  -GI recs  -CLD, NPO at [facility]  -IV PPI for now  -Maintain active T&S, 2 large bore PIV  -F/up post transfusion CBC, transfuse hgb  >7  -Trend CBC q8h    #HTN  -Hold home antihypertensives iso GIB (amlodipine 10 mg)     #BPH  -Hold finasteride and tamsulosin for now iso softer BP     #Severe COPD  -Breztri NF. Spiriva/breo inpatient  -Albuterol prn     -Unclear why on ASA 81 mg daily, will hold for now      F: bolus PRN  E: replete PRN  N: CLD, NPO at [facility]  A: PIV x1    DVT PPx: hold iso GIB  GI PPx: IV PPI    CODE STATUS: full code (confirmed on admission with pt and wife)  SURROGATE DECISION MAKER: Anna (wife) [phone]

## 2025-04-21 NOTE — CONSULTS
"  [facility]   [facility]  INITIAL CONSULT NOTE       Reason For Consult  Hematochezia x1week, acute anemia    SUBJECTIVE     History Of Present Illness  Nadine Smith is a 80 y.o. adult with a past medical history of lung adenoCa s/p SBRT 2023, local recurrence in RUL 2/2025 currently receiving proton therapy , COPD, HTN, BPH who presented to the ED 4/21 for one week of hematochezia. GI is consulted for the same.    Pt is seen in donovan bed with his wife at bedside. He reports one week of painless hematochezia (dark red). Earlier 4/21, he had an episode of hematochezia that was larger along with urgency and (?)lightheadedness which worried him leading to his current presentation to the ED. Last Bm was 4/21AM and he has not had further episodes while in the ED. He endorses good PO intake, without nausea/vomiting, abdominal pain. He usually has 3Bms daily that are soft to formed. Denies taking NSAIDs. Reports last colonoscopy was in 2014 and never had an EGD.     Social History:  -quit tobacco 2023  -no etoh or drug use    Family History:  Family History[1]    Surgical History:  Surgical History[2]    Home Medications  Prescriptions Prior to Admission[3]    Allergies:  Allergies[4]        EXAM     Vitals:    Vitals:    04/21/25 1117 04/21/25 1415 04/21/25 1700   BP: 101/58 107/68 124/60   Pulse: 87 81 90   Resp: 16 16 16   Temp: 37.5 °C (99.5 °F)     TempSrc: Oral     SpO2: 99% 97% 95%   Weight: 85.7 kg (189 lb)     Height: 1.778 m (5' 10\")       Failed to redirect to the Timeline version of the [employer] Smart[employer].  No intake or output data in the 24 hours ending 04/21/25 1751    Physical Exam   General: well-developed, well-nourished, no acute distress, AAOx3  HEENT: EOM intact, hard of hearing  CV: regular rate and rhythm  Pulm: normal respiratory effort, clear to auscultation bilaterally  Abd: soft, nontender, nondistended, normoactive bowel " sounds  Extremities: warm, non pitting BLE edema  Neuro: moves all extremities equally  Psych: normal mood and affect  Skin: warm, dry, intact     OBJECTIVE                                                                              Medications     Current Medications[5]                                                                            Labs     Labs:  CBC RFP   Lab Results   Component Value Date    WBC 7.5 04/21/2025    HGB 6.5 (LL) 04/21/2025    HCT 19.7 (L) 04/21/2025    MCV 76 (L) 04/21/2025     (L) 04/21/2025    NEUTROABS 5.67 (H) 04/21/2025    Lab Results   Component Value Date     04/21/2025    K 4.0 04/21/2025     (H) 04/21/2025    CO2 22 04/21/2025    BUN 48 (H) 04/21/2025    CREATININE 1.06 04/21/2025     Lab Results   Component Value Date    MG CANCELED 06/11/2023    PHOS CANCELED 06/11/2023    CALCIUM 8.3 (L) 04/21/2025    ALBUMIN 3.6 04/21/2025         Hepatic Function ABG/VBG   Lab Results   Component Value Date    ALT 7 04/21/2025    AST 13 04/21/2025    ALKPHOS 38 04/21/2025     Lab Results   Component Value Date    BILITOT 0.7 04/21/2025    BILIDIR 0.2 06/09/2023     Lab Results   Component Value Date    PROTIME 12.8 (H) 04/21/2025    APTT 27 06/18/2024    INR 1.2 (H) 04/21/2025    Lab Results   Component Value Date    LACTATE 0.5 05/11/2024                                                                                      Imaging           CT Abdomen and Pelvis with IV Contrast; 5/11/2024   IMPRESSION:  1. There are bilateral inguinal hernias containing fat and  vasculature, but no loops of bowel.  2. There is a gallstone, but no evidence for cholecystitis or biliary  obstruction.                                                                         GI Procedures     Cscope 5/8/2014:       FINAL DIAGNOSIS   A.  RECTUM, COLON, POLYPECTOMY:    --RECTAL MUCOSAL PROLAPSE.     ASSESSMENT / PLAN                  ASSESSMENT/PLAN:  Nadine Jarquin Raztheresa is a 80 y.o. adult  with a past medical history of lung adenoCa s/p SBRT 2023, local recurrence in RUL 2/2025 currently receiving proton therapy , COPD, HTN, BPH who presented to the ED 4/21 for one week of hematochezia. GI is consulted for the same.    Recommendations:  -Plan for colonoscopy tomorrow (4/22). Of note, it is noted that the patient is in a donovan bed making timely initiation of bowel prep difficult; it was discussed with the patient that there is a chance that the procedure may be postponed if his stools are not clear tomorrow AM  -please maintain clear liquid diet,  no red liquids today  -please have patient be NPO after midnight, with exception to bowel prep and meds  -Please order Golytely 2 liters to be administered at 5 pm and an additional 2 liters at 10 pm. If the patient is not having clear stools at 0200 morning then please administer an additional 1-2 liters of Golytely  -Can use NG tube or dobhoff to help with bowel prep.   - Maintain at least 2 large bore PIVs (18 gauge), or central line access.  - Transfuse PRBCs for target Hgb 7.  Transfuse platelets to goal >50k if active bleeding.    Patient was discussed with Dr. Gallego .    Gastroenterology will continue to follow .  During weekday hours of 7am-5pm please do not hesitate to contact me on [facility] Chat or page [phone], if there are any further questions between the weekday hours of 7am-5pm.   After hours, on weekends, and on holidays, please [place] [phone]. Thank you.            [1]   Family History  Problem Relation Name Age of Onset    Cancer Father     [2]   Past Surgical History:  Procedure Laterality Date    BRONCHOSCOPY      Bronchoscopy with biopsy showed adenocarcinoma.    COLONOSCOPY      CT ANGIO NECK  05/18/2017    CT NECK ANGIO W AND WO IV CONTRAST 5/18/2017 [facility] [place] [facility]    CT HEAD ANGIO W AND WO IV CONTRAST  05/18/2017    CT HEAD ANGIO W AND WO IV CONTRAST 5/18/2017 [facility] [place] [facility]   [3] (Not in a hospital  admission)  [4] No Known Allergies  [5]   Current Facility-Administered Medications:     albuterol 2.5 mg /3 mL (0.083 %) nebulizer solution 2.5 mg, 2.5 mg, nebulization, 4x daily PRN, Dinorah Martinez MD    albuterol 90 mcg/actuation inhaler 2 puff, 2 puff, inhalation, q4h PRN, Dinorah Martinez MD    [Held by provider] aspirin EC tablet 81 mg, 81 mg, oral, Daily, Dinorah Martinez MD    [Held by provider] budesonide (Pulmicort) 0.5 mg/2 mL nebulizer solution 0.5 mg, 0.5 mg, nebulization, BID, Dinorah Martinez MD    [Held by provider] finasteride (Proscar) tablet 5 mg, 5 mg, oral, Daily, Dinorah Martinez MD    [START ON 4/22/2025] tiotropium (Spiriva Respimat) 2.5 mcg/actuation inhaler 2 puff, 2 puff, inhalation, Daily **AND** fluticasone furoate-vilanteroL (Breo Ellipta) 100-25 mcg/dose inhaler 1 puff, 1 puff, inhalation, Daily, Dinorah Martinez MD    pantoprazole (Protonix) injection 40 mg, 40 mg, intravenous, BID, Dinorah Martinez MD    [Held by provider] tamsulosin (Flomax) 24 hr capsule 0.4 mg, 0.4 mg, oral, BID, Dinorah Martinez MD    Current Outpatient Medications:     albuterol (Ventolin HFA) 90 mcg/actuation inhaler, Inhale 2 puffs every 4 hours if needed for wheezing or shortness of breath., Disp: 24 g, Rfl: 3    amLODIPine (Norvasc) 10 mg tablet, Take 1 tablet (10 mg) by mouth once daily., Disp: , Rfl:     aspirin 81 mg EC tablet, Take 1 tablet (81 mg) by mouth once daily., Disp: , Rfl:     Breztri Aerosphere 160-9-4.8 mcg/actuation HFA aerosol inhaler, INHALE 2 PUFFS 2 TIMES A DAY., Disp: 10.7 g, Rfl: 4    budesonide (Pulmicort) 0.5 mg/2 mL nebulizer solution, 1 VIAL INHALATION TWICE A DAY 90 DAYS, Disp: , Rfl:     finasteride (Proscar) 5 mg tablet, Take 1 tablet (5 mg) by mouth once daily., Disp: , Rfl:     tamsulosin (Flomax) 0.4 mg 24 hr capsule, Take 1 capsule (0.4 mg) by mouth 2 times a day. 30 minutes after the same meal each day., Disp: , Rfl:     albuterol 2.5 mg /3 mL (0.083 %) nebulizer solution, TAKE 3 ML (2.5  MG) BY NEBULIZATION 4 TIMES A DAY AS NEEDED FOR WHEEZING OR SHORTNESS OF BREATH., Disp: 75 mL, Rfl: 5

## 2025-04-21 NOTE — H&P
"MEDICINE ADMISSION NOTE  Subjective   History of Present Illness  Nadine Smith is a 80 y.o. 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    While in ED:  Patient's labs were revealing of hbg 6.5 (drop from 13.2 in Feb 2025). Per ED note, \"no obvious bright red blood on exam but is Hemoccult positive\". Blood pressures remained soft initally (101/58) but stable. Heart rates regular rate. Remains on room air. He was ordered one unit of pRBCs. GI was consulted as well. He was admitted to medicine for further management.     Upon evaluation, the patient is resting in bed comfortably. His wife is with him at bedside. He states that over the past one month or so he has noticed darker stools with increased darkening over the past one week. This was not very concerning to him until he experienced an episode of lightheadedness with a large, dark red, soft bowel movement earlier today. Patient states that he was on his way to the restroom when he suddenly felt as though he could not control his bowel movement, resulting in a large mess of dark red stool \"everywhere\". He said that by the time he was able to make it to the commode, he \"felt a rush\" of dizziness in the back of his head. He braced himself against the wall until the dizziness resolved. The episode alarmed him and therefore prompted him to present to the ED, missing his scheduled proton therapy earlier today.    He has not had any further bloody/dark BMs since then. He states that at rest, he does not have dizziness or lightheadedness. His dizziness returns when he is ambulating and resolves shortly after he rests. Denies any falls. He endorses his baseline shortness of breath with " exertion or bending forward, unchanged from prior. He denies any abdominal pain or chest pain. He does endorse some reflux/indigestion occasionally, but only after eating spicy foods. He denies any OTC drug/supplement use. He denies any recent history of NSAID or alcohol use. He does take a baby aspirin every day but has not other AC/blood thinner use. Denies any N/V or F/C. He does endorse some chronic bilateral lower extremity swelling, recently worked-up with an outpatient DVT U/S of both lower extremities - negative.    Patient denies any family or personal history of GI cancers. He states that his father  of lung cancer. Otherwise he does not know of any cancer in his family. He himself underwent colonoscopy most recently in May 2014 (age 69) showing one 7 mm poly in the rectum, which was resected, pathology showing rectal mucosal prolapse, as well as diverticulosis in the sigmoid colon. It was recommended that he repeat colonoscopy in 5 years for surveillance at that time. Patient is unsure why this was not repeated since then. He has no history of EGDs.    Oncology history:  Lung adenocarcinoma - poorly differentiated, invasive, TTF-1 positive by immunohistochemistry, 2023 status post SBRT 2023  - 2023 (5 fractions, 60 Gy). Patient was found to have local recurrence within the RUL (undergoing repeat bronchoscopy and biopsy 2025 revealing adenocarcinoma, acinar pattern, currently undergoing proton therapy. Follows with Dr. Jordan (primary oncologist) and Dr. Lee (Rad/onc). He also following with Pulmonology clinic as well.    For further details of full history of lung cancer discovery and work-up, please se Dr. Jordan's most recent clinic note for .     ED Course:  BP: 101/58  Temperature: 37.5 °C (99.5 °F)  Heart Rate: 87  Respirations: 16  MAP (mmHg): 74  Pulse Ox: 99 %    ED Interventions:  Medications   pantoprazole (Protonix) injection 40 mg (has no administration  in time range)   albuterol 90 mcg/actuation inhaler 2 puff (has no administration in time range)   albuterol 2.5 mg /3 mL (0.083 %) nebulizer solution 2.5 mg (has no administration in time range)   aspirin EC tablet 81 mg ( oral Dose Auto Held 4/25/25 0900)   tiotropium (Spiriva Respimat) 2.5 mcg/actuation inhaler 2 puff (has no administration in time range)     And   fluticasone furoate-vilanteroL (Breo Ellipta) 100-25 mcg/dose inhaler 1 puff (1 puff inhalation Not Given 4/21/25 1957)   budesonide (Pulmicort) 0.5 mg/2 mL nebulizer solution 0.5 mg ( nebulization Dose Auto Held 4/25/25 1900)   finasteride (Proscar) tablet 5 mg ( oral Dose Auto Held 4/26/25 0900)   tamsulosin (Flomax) 24 hr capsule 0.4 mg ( oral Dose Auto Held 4/25/25 2100)   polyethylene glycol-electrolytes (Nulytely) solution 4,000 mL (has no administration in time range)   polyethylene glycol-electrolytes (Nulytely) solution 2,000 mL (has no administration in time range)       Cardiac Hx:  Last echo: No results found for this or any previous visit.   Last cath: [facility] CathPCI V5 Collection Form    Pre-Procedure Information    Cardiac Arrest  - The patient's level of consciousness was alert.      Last EKG: No results found for this or any previous visit (from the past 4464 hours).     GI Hx:  Last EGD: === 02/18/25 ===    BRONCHOSCOPY    - Narrative -  Table formatting from the original result was not included.  Images from the original result were not included.    Bronchoscopy Operative Report  [facility]    Date of procedure: 02/18/25  Patient: Nadine Smith  MRN: [id number]  YOB: 1945  Gender: adult  Referring provider/physician: Deep Lee MD, Loree Munoz MD    PRE-PROCEDURE DIAGNOSIS:  Lung Cancer, RUL Nodules    PRE-PROCEDURE EVALUATION: A history and physical has been performed, and patient medication allergies have been reviewed. The patient's tolerance of previous  anesthesia has been reviewed. The risks and benefits of the procedure and the sedation options and risks were discussed with the patient or their designee. All questions were answered and informed consent obtained.    INDICATION: Obtain diagnosis and Staging    BRONCHOSCOPIST:  Fatimah Muhammad MD  First Assistant:  Heather Montes De Oca MD    PROCEDURES PERFORMED:  Flexible Bronchoscopy  Navigational Bronchoscopy ([place])  RUL Nodule x2, TBNA, TBBX with 1.1 cryoprobe, Bronchial [place]  Radial EBUS  EBUS 4L, 7, 4R, 11Rs, 11Ri    POST-PROCEDURE DIAGNOSIS:  Lung Cancer, RUL Nodules    ANESTHESIA:  GETA. See separate anesthesia provider documentation. This procedure was performed using standard monitoring procedures in [facility].    PROCEDURE SUMMARY:    After obtaining informed consent and performing a time out, the patient was anesthetized and an ET tube was placed by anesthesia.  The flexible bronchoscope was then introduced through the advanced airway, and an airway examination was performed.    The ET tube is in good position.  The trachea is of normal caliber. The kulwant is sharp. The tracheobronchial tree of the bilateral lung(s) was examined to at least the first subsegmental level.  Bronchial anatomy is normal; there are no endobronchial lesions, and no secretions.    The robotic endoscopic system was then positioned and the robotic scope was introduced, and the initial electromagnetic registration was performed. Using the computer-assisted navigation, the robotic scope was advanced into the target airway in the right upper lobe lesion 1.  Co-localization was performed with radial peripheral endobronchial ultrasound (eccentric view) as well as with fluoroscopy.    Once the lesion was localized, sampling commenced. 5 Transbronchial needle aspiration, 2 protected cytology brush were performed. 2 cryobiopsy were also performed. All sampling were performed under real-time fluoroscopic  guidance.    Using the computer-assisted navigation, the robotic scope was advanced into the target airway in the right upper lobe lesion 2.  Co-localization was performed with radial peripheral endobronchial ultrasound (eccentric view) as well as with fluoroscopy.    Once the lesion was localized, sampling commenced. 4 Transbronchial needle aspiration, and 2 cryobiopsy were performed. All sampling were performed under real-time fluoroscopic guidance.    After sampling concluded. An airway examination was completed to assess for bleeding and fluoroscopy was used to evaluate for a pneumothorax.    The flexible bronchoscope was removed from the airway, and exchanged for the curvilinear EBUS bronchoscope.  A systematic EBUS staging examination of the bilateral rajwinder and mediastinum was performed, as documented below.    Lymph node sizing was performed via endobronchial ultrasound for suspected lung cancer.  Sampling by transbronchial needle aspiration was performed using a 22-gauge Olympus ViziShot needle and sent for routine cytology.    The 11L (interlobar) node measured 3.6 mm in size. Sampling was not done as it was not clinically indicated.  The 4L (lower paratracheal) node measured 6.2 mm in size. Sampling was done, 3 samples with the needle were obtained.  The 7 (subcarinal) node measured 8.3 mm in size. Sampling was done, 3 samples with the needle were obtained.  The 4R (lower paratracheal) node measured 7 mm in size. Sampling was done, 3 samples with the needle were obtained.  The 11Rs (superios interlobar) node measured 8.3 mm in size. Sampling was done, 3 samples with the needle were obtained.  The 11Ri (inferior interlobar) node measured 4.8 mm in size. Sampling was done, 3 samples with the needle were obtained.    Rapid On-Site Evaluation (DORCAS):  Preliminary cytology from RUL lesions was non-diagnostic. Preliminary cytology from the lymph node station(s) 4L, 4R, 7, 11L, 11Rs showed lymphoid tissue (final  results are pending). Lymph node 11Rs was suggestive of non-diagnostic material.    COMPLICATIONS:  None    ESTIMATED BLOOD LOSS:  Minimal, < 5 mL    The physical status of the patient was re-assessed after the procedure.  The patient was transported to the recovery area / PACU in stable condition.    - Impression -  Guided bronchoscopy with radial probe EBUS to localize the two peripheral RUL lesions was performed.  Rapid On-Site Evaluation (DORCAS): Preliminary cytology of the lesions in the RUL was suggestive of non-diagnostic material (final results are pending).  Linear EBUS was used to evaluate the lymph node station 4L, 4R, 7, 11L, 11Rs, 11Ri and EBUS-TBNA was performed.  Rapid On-Site Evaluation (DORCAS): Preliminary cytology was suggestive of lymphoid tissue in node level 4L, 4R, 7, 11Ri (final results are pending). Lymph node 11Rs was suggestive of non-diagnostic material.    RECOMMENDATION:  Follow-up with referring physician regarding bronchoscopy results  The patient was transported to the recovery area/PACU in stable condition.    I, Fatimah Muhammad MD, was personally present throughout this procedure, including all key and non-key portions.        Fatimah Muhammad MD   Interventional Pulmonology  Date: 02/18/25 Time: 3:02 PM  (Images obtained during this procedure, including ultrasonographic images if performed, can be found in within the electronic medical record and/or PACS.)      Events  Procedure Events  Event Event Time  ENDO SCOPE IN TIME 2/18/2025 11:05 AM  ENDO SCOPE OUT TIME 2/18/2025  2:05 PM      Specimens  ID Type Source Tests Collected by Time  1 : RUL nodule #1 : PET positive // TBBX with cryo Tissue TRANSBRONCHIAL BIOPSY SURGICAL PATHOLOGY EXAM Fatimah Muhammad MD 2/18/2025 1231  2 : RUL nodule #2 : TBBX with cyro Tissue TRANSBRONCHIAL BIOPSY SURGICAL PATHOLOGY EXAM Fatimah Muhammad MD 2/18/2025 1229  A : RUL nodule #1 : PET positive Non-Gynecologic Cytology LUNG FINE NEEDLE  ASPIRATION RIGHT UPPER LOBE CYTOLOGY CONSULTATION (NON-GYNECOLOGIC) Fatimah Muhammad MD 2/18/2025 1059  B : RUL nodule #1 : PET positive Non-Gynecologic Cytology BRONCHIAL BRUSH RIGHT UPPER LOBE CYTOLOGY CONSULTATION (NON-GYNECOLOGIC) Fatimah Muhammad MD 2/18/2025 1151  C : RUL nodule #2 Non-Gynecologic Cytology LUNG FINE NEEDLE ASPIRATION RIGHT UPPER LOBE CYTOLOGY CONSULTATION (NON-GYNECOLOGIC) Fatimah Muhammad MD 2/18/2025 1242  D :  Non-Gynecologic Cytology LYMPH NODE 4 L PULMONARY FINE NEEDLE ASPIRATION CYTOLOGY CONSULTATION (NON-GYNECOLOGIC) Fatimah Muhammad MD 2/18/2025 1309  E :  Non-Gynecologic Cytology LYMPH NODE 7 PULMONARY FINE NEEDLE ASPIRATION CYTOLOGY CONSULTATION (NON-GYNECOLOGIC) Fatimah Muhammad MD 2/18/2025 1322  F :  Non-Gynecologic Cytology LYMPH NODE 4 R PULMONARY FINE NEEDLE ASPIRATION CYTOLOGY CONSULTATION (NON-GYNECOLOGIC) Fatimah Muhammad MD 2/18/2025 1347  G :  Non-Gynecologic Cytology LYMPH NODE 11 Rs PULMONARY FINE NEEDLE ASPIRATION CYTOLOGY CONSULTATION (NON-GYNECOLOGIC) Fatimah Muhammad MD 2/18/2025 1334  H :  Non-Gynecologic Cytology LYMPH NODE 11 Ri PULMONARY FINE NEEDLE ASPIRATION CYTOLOGY CONSULTATION (NON-GYNECOLOGIC) Fatimah Muhammad MD 2/18/2025 1356      Procedure Location  [facility]  [id number] [place] [id number] [phone]    Referring Provider  Sue Soriano MD    Procedure Provider  Fatimah Muhammad MD   Last Colonoscopy: === 02/18/25 ===    BRONCHOSCOPY    - Narrative -  Table formatting from the original result was not included.  Images from the original result were not included.        Bronchoscopy Operative Report  [facility]    Date of procedure: 02/18/25  Patient: Nadine Smith  MRN: [id number]  YOB: 1945  Gender: adult  Referring provider/physician: Deep Lee MD, Loree Munoz MD    PRE-PROCEDURE DIAGNOSIS:  Lung Cancer, RUL  Nodules    PRE-PROCEDURE EVALUATION: A history and physical has been performed, and patient medication allergies have been reviewed. The patient's tolerance of previous anesthesia has been reviewed. The risks and benefits of the procedure and the sedation options and risks were discussed with the patient or their designee. All questions were answered and informed consent obtained.    INDICATION: Obtain diagnosis and Staging    BRONCHOSCOPIST:  Fatimah Muhammad MD  First Assistant:  Heather Montes De Oca MD    PROCEDURES PERFORMED:  Flexible Bronchoscopy  Navigational Bronchoscopy ([place])  RUL Nodule x2, TBNA, TBBX with 1.1 cryoprobe, Bronchial [place]  Radial EBUS  EBUS 4L, 7, 4R, 11Rs, 11Ri    POST-PROCEDURE DIAGNOSIS:  Lung Cancer, RUL Nodules    ANESTHESIA:  GETA. See separate anesthesia provider documentation. This procedure was performed using standard monitoring procedures in [facility].    PROCEDURE SUMMARY:    After obtaining informed consent and performing a time out, the patient was anesthetized and an ET tube was placed by anesthesia.  The flexible bronchoscope was then introduced through the advanced airway, and an airway examination was performed.    The ET tube is in good position.  The trachea is of normal caliber. The kulwant is sharp. The tracheobronchial tree of the bilateral lung(s) was examined to at least the first subsegmental level.  Bronchial anatomy is normal; there are no endobronchial lesions, and no secretions.    The robotic endoscopic system was then positioned and the robotic scope was introduced, and the initial electromagnetic registration was performed. Using the computer-assisted navigation, the robotic scope was advanced into the target airway in the right upper lobe lesion 1.  Co-localization was performed with radial peripheral endobronchial ultrasound (eccentric view) as well as with fluoroscopy.    Once the lesion was localized, sampling commenced. 5  Transbronchial needle aspiration, 2 protected cytology brush were performed. 2 cryobiopsy were also performed. All sampling were performed under real-time fluoroscopic guidance.    Using the computer-assisted navigation, the robotic scope was advanced into the target airway in the right upper lobe lesion 2.  Co-localization was performed with radial peripheral endobronchial ultrasound (eccentric view) as well as with fluoroscopy.    Once the lesion was localized, sampling commenced. 4 Transbronchial needle aspiration, and 2 cryobiopsy were performed. All sampling were performed under real-time fluoroscopic guidance.    After sampling concluded. An airway examination was completed to assess for bleeding and fluoroscopy was used to evaluate for a pneumothorax.    The flexible bronchoscope was removed from the airway, and exchanged for the curvilinear EBUS bronchoscope.  A systematic EBUS staging examination of the bilateral rajwinder and mediastinum was performed, as documented below.    Lymph node sizing was performed via endobronchial ultrasound for suspected lung cancer.  Sampling by transbronchial needle aspiration was performed using a 22-gauge Olympus ViziShot needle and sent for routine cytology.    The 11L (interlobar) node measured 3.6 mm in size. Sampling was not done as it was not clinically indicated.  The 4L (lower paratracheal) node measured 6.2 mm in size. Sampling was done, 3 samples with the needle were obtained.  The 7 (subcarinal) node measured 8.3 mm in size. Sampling was done, 3 samples with the needle were obtained.  The 4R (lower paratracheal) node measured 7 mm in size. Sampling was done, 3 samples with the needle were obtained.  The 11Rs (superios interlobar) node measured 8.3 mm in size. Sampling was done, 3 samples with the needle were obtained.  The 11Ri (inferior interlobar) node measured 4.8 mm in size. Sampling was done, 3 samples with the needle were obtained.    Rapid On-Site Evaluation  (DORCAS):  Preliminary cytology from RUL lesions was non-diagnostic. Preliminary cytology from the lymph node station(s) 4L, 4R, 7, 11L, 11Rs showed lymphoid tissue (final results are pending). Lymph node 11Rs was suggestive of non-diagnostic material.    COMPLICATIONS:  None    ESTIMATED BLOOD LOSS:  Minimal, < 5 mL    The physical status of the patient was re-assessed after the procedure.  The patient was transported to the recovery area / PACU in stable condition.    - Impression -  Guided bronchoscopy with radial probe EBUS to localize the two peripheral RUL lesions was performed.  Rapid On-Site Evaluation (DORCAS): Preliminary cytology of the lesions in the RUL was suggestive of non-diagnostic material (final results are pending).  Linear EBUS was used to evaluate the lymph node station 4L, 4R, 7, 11L, 11Rs, 11Ri and EBUS-TBNA was performed.  Rapid On-Site Evaluation (DORCAS): Preliminary cytology was suggestive of lymphoid tissue in node level 4L, 4R, 7, 11Ri (final results are pending). Lymph node 11Rs was suggestive of non-diagnostic material.    RECOMMENDATION:  Follow-up with referring physician regarding bronchoscopy results  The patient was transported to the recovery area/PACU in stable condition.    I, Fatimah Muhammad MD, was personally present throughout this procedure, including all key and non-key portions.        Fatimah Muhammad MD   Interventional Pulmonology  Date: 02/18/25 Time: 3:02 PM  (Images obtained during this procedure, including ultrasonographic images if performed, can be found in within the electronic medical record and/or PACS.)      Events  Procedure Events  Event Event Time  ENDO SCOPE IN TIME 2/18/2025 11:05 AM  ENDO SCOPE OUT TIME 2/18/2025  2:05 PM      Specimens  ID Type Source Tests Collected by Time  1 : RUL nodule #1 : PET positive // TBBX with cryo Tissue TRANSBRONCHIAL BIOPSY SURGICAL PATHOLOGY EXAM Fatimah Muhammad MD 2/18/2025 1231  2 : RUL nodule #2 : TBBX with cyro  Tissue TRANSBRONCHIAL BIOPSY SURGICAL PATHOLOGY EXAM Fatimah Muhammad MD 2/18/2025 1229  A : RUL nodule #1 : PET positive Non-Gynecologic Cytology LUNG FINE NEEDLE ASPIRATION RIGHT UPPER LOBE CYTOLOGY CONSULTATION (NON-GYNECOLOGIC) Fatimah Muhammad MD 2/18/2025 1059  B : RUL nodule #1 : PET positive Non-Gynecologic Cytology BRONCHIAL BRUSH RIGHT UPPER LOBE CYTOLOGY CONSULTATION (NON-GYNECOLOGIC) Fatimah Muhammad MD 2/18/2025 1151  C : RUL nodule #2 Non-Gynecologic Cytology LUNG FINE NEEDLE ASPIRATION RIGHT UPPER LOBE CYTOLOGY CONSULTATION (NON-GYNECOLOGIC) Fatimah Muhammad MD 2/18/2025 1242  D :  Non-Gynecologic Cytology LYMPH NODE 4 L PULMONARY FINE NEEDLE ASPIRATION CYTOLOGY CONSULTATION (NON-GYNECOLOGIC) Fatimah Muhammad MD 2/18/2025 1309  E :  Non-Gynecologic Cytology LYMPH NODE 7 PULMONARY FINE NEEDLE ASPIRATION CYTOLOGY CONSULTATION (NON-GYNECOLOGIC) Fatimah Muhammad MD 2/18/2025 1322  F :  Non-Gynecologic Cytology LYMPH NODE 4 R PULMONARY FINE NEEDLE ASPIRATION CYTOLOGY CONSULTATION (NON-GYNECOLOGIC) Fatimah Muhammad MD 2/18/2025 1347  G :  Non-Gynecologic Cytology LYMPH NODE 11 Rs PULMONARY FINE NEEDLE ASPIRATION CYTOLOGY CONSULTATION (NON-GYNECOLOGIC) Fatimah Muhammad MD 2/18/2025 1334  H :  Non-Gynecologic Cytology LYMPH NODE 11 Ri PULMONARY FINE NEEDLE ASPIRATION CYTOLOGY CONSULTATION (NON-GYNECOLOGIC) Fatimah Muhammad MD 2/18/2025 1356      Procedure Location  [facility]  [address] [facility] [id number] [phone]    Referring Provider  Seu Soriano MD    Procedure Provider  Fatimah Muhammad MD      Past Medical History  Medical History[1]    Surgical History  Surgical History[2]    Social History  She reports that she has quit smoking. Her smoking use included cigarettes. She has never used smokeless tobacco. She reports that she does not currently use alcohol. She reports that she does not use drugs.    Family History  Family History[3]    "  Allergies  Patient has no known allergies.         Objective   Current Vitals  /69   Pulse 82   Temp 36.9 °C (98.4 °F) (Oral)   Resp 18   Ht 1.778 m (5' 10\")   Wt 85.7 kg (189 lb)   SpO2 95%   BMI 27.12 kg/m²      No intake/output data recorded.    Labs:   CBC: WBC 7.5 , HGB 6.5,   BMP: , K 4.0, Cl 111, HCO3 22, BUN 48, CR 1.06, Glu 82  LFTS: AST 13 , ALT 7, ALKPHOS 38 , TBILI 0.7 , DBILI No results found for requested labs within last 365 days.  TROP: No results found for requested labs within last 365 days.  BNP: No results found for requested labs within last 365 days.  COAGS: PT 12.8 , PTT 27  , INR 1.2  UA:     ABG:    CALCIUM 8.3 MAG No results found for requested labs within last 365 days. ALB 3.6 LACTATE 0.5 PHOS No results found for requested labs within last 365 days. COVIDNo results found for requested labs within last 365 days.    EKG  No results found for this or any previous visit (from the past 4464 hours).     Imaging:  Imaging  No results found.    Cardiology, Vascular, and Other Imaging  No other imaging results found for the past 2 days       Micro/culture data:  No results found for the last 120 days.      Physical Exam  Vitals reviewed.   Constitutional:       General: She is not in acute distress.     Appearance: Normal appearance. She is not ill-appearing.   HENT:      Head: Normocephalic and atraumatic.      Ears:      Comments: Wearing hearing aids bilaterally      Nose: Nose normal.      Mouth/Throat:      Mouth: Mucous membranes are moist.      Pharynx: Oropharynx is clear.   Eyes:      Extraocular Movements: Extraocular movements intact.      Conjunctiva/sclera: Conjunctivae normal.   Cardiovascular:      Rate and Rhythm: Normal rate and regular rhythm.      Heart sounds: Normal heart sounds.   Pulmonary:      Effort: Pulmonary effort is normal.      Breath sounds: Normal breath sounds. No wheezing, rhonchi or rales.   Abdominal:      General: Abdomen is flat. " Bowel sounds are normal. There is no distension.      Palpations: Abdomen is soft.      Tenderness: There is no abdominal tenderness. There is no guarding or rebound.   Musculoskeletal:         General: Normal range of motion.      Cervical back: Normal range of motion and neck supple.      Right lower leg: Edema (2+ to the level of the knee) present.      Left lower leg: Edema (2+ to the level of the knee) present.   Skin:     General: Skin is warm and dry.   Neurological:      General: No focal deficit present.      Mental Status: She is alert and oriented to person, place, and time. Mental status is at baseline.   Psychiatric:         Mood and Affect: Mood normal.         Thought Content: Thought content normal.       Medications  Home Meds  Prior to Admission medications    Medication Sig Start Date End Date Taking? Authorizing Provider   albuterol (Ventolin HFA) 90 mcg/actuation inhaler Inhale 2 puffs every 4 hours if needed for wheezing or shortness of breath. 1/23/25 1/23/26 Yes KARLEY Mari   amLODIPine (Norvasc) 10 mg tablet Take 1 tablet (10 mg) by mouth once daily. 5/24/23  Yes Historical Provider, MD   aspirin 81 mg EC tablet Take 1 tablet (81 mg) by mouth once daily. 5/3/17  Yes Historical Provider, MD Loaiza Aerosphere 160-9-4.8 mcg/actuation HFA aerosol inhaler INHALE 2 PUFFS 2 TIMES A DAY. 4/11/25  Yes KARLEY Mari   budesonide (Pulmicort) 0.5 mg/2 mL nebulizer solution 1 VIAL INHALATION TWICE A DAY 90 DAYS   Yes Historical Provider, MD   finasteride (Proscar) 5 mg tablet Take 1 tablet (5 mg) by mouth once daily.   Yes Historical Provider, MD   tamsulosin (Flomax) 0.4 mg 24 hr capsule Take 1 capsule (0.4 mg) by mouth 2 times a day. 30 minutes after the same meal each day.   Yes Historical Provider, MD   albuterol 2.5 mg /3 mL (0.083 %) nebulizer solution TAKE 3 ML (2.5 MG) BY NEBULIZATION 4 TIMES A DAY AS NEEDED FOR WHEEZING OR SHORTNESS OF BREATH. 3/11/25 3/11/26   Gabbie Sutton, APRN-CNP   formoterol (Perforomist) 20 mcg/2 mL nebulizer solution 2 ML INHALATION TWICE A DAY 30 DAYS 4/4/24 4/21/25  Historical Provider, MD   hydrALAZINE (Apresoline) 25 mg tablet 1 TABLET WITH FOOD ORALLY EVERY 8 HOURS 30 DAYS  Patient not taking: Reported on 4/14/2025 11/27/24 4/21/25  Historical Provider, MD   hydrALAZINE (Apresoline) 50 mg tablet every 8 hours.  Patient not taking: Reported on 4/14/2025 4/21/25  Historical Provider, MD   oxyCODONE-acetaminophen (Percocet) 5-325 mg tablet 1 TABLET AS NEEDED ORALLY EVERY 6 HRS 7 DAYS 6/24/24 4/21/25  Historical Provider, MD     Scheduled medications  Scheduled Medications[4]  Continuous medications  Continuous Medications[5]  PRN Medications[6]           Assessment/Plan   Nadine Smith is a 80 y.o. 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 Currently admitted for c/f GIB, GI planning for c-scope in the morning. Patient remains hemodynamically stable, on room air.     # Dark red stools, hemoccult positive  # C/f LGIB  - Coloscopy in 2014 (age 69) with only 7 mm poly removed, rectal mucosal prolapse. Recommenced repeat in 5 years which was not performed.  - CT A/P 05/2024 without c/f bowel abnormalities  - PET/CT 01/20/2025 for eval of right upper lobe CA showing no PET evidence of distance metastasis  - Presenting to ED today for lightheadedness and dark stools (1 month, with 1 large episode earlier today as per HPI)  - While in ED found to have hbg 6.5 (drop from 13.2 in Feb 2025) and hemoccult positive exam  PLAN:  - GI consulted while in ED, recs appreciated  - Pre-swenson recs to start CLD no reds + NPO an  MN and bowel prep ASAP for plans of C-scope in AM  - 1 U pRBCs ordered by ED   - 1 hr post transfusion CBC ordered to follow   - Will CTM CBC Q8 hrs overnight  - Will transfuse prn for hbg goal >7   - active T&S   - insert and maintain 2 large bore PIVs  - Continue PPI BID for now   - Per GI, likely LGIB ok to DC    # Lung adenocarcinoma currently receiving proton therapy  - S/p SBRT 2023 for [id number] 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 Jordan (primary oncologist) and Dr. Lee (Rad/onc)  PLAN:  - Will notify primary oncologist in AM  - Will plan to consult Rad/onc in AM for inpatient continuation of 5-days-per-week proton therapy   - Missed session 04/21    # Hx of  COPD, severe  -  PFTs with severe obstruction  PLAN:   - Sub home breztri for Spiriva/breo while in patient  - Albuterol PRN    # Hx of HTN  - Hold home antihypertensives iso GIB (amlodipine 10 mg)     # BPH  -Hold finasteride and tamsulosin for now iso softer BP / GIB    Misc:  Pt take ASA 81 daily - holding for GIB    Fluids: Replete PRN  Electrolytes: Keep mg >2, phos >3  and K >4  Nutrition:  Adult diet Clear Liquid  NPO Diet; Effective midnight     DVT PPX:DVT: None  GI ppx: PPI BID  Bowel care:N/A  Catheter:None  Lines:PIV  Oxygen:Room Air     Code Status: Full Code (confirmed on admission)   NOK:  Primary Emergency Contact: RazAnna cardenas [phone]    Lucy Thomas MD  Internal Medicine, PGY-1  04/21/25 at 8:03 PM          [1]   Past Medical History:  Diagnosis Date    Bilateral inguinal hernia     R>L    BPH (benign prostatic hyperplasia)     Cataract     COPD (chronic obstructive pulmonary disease) (Multi)     PFTs with severe obstruction. Echo with normal EF.  Continue bevespi and albuterol    COVID-19 10/2021    Disorder of prostate, unspecified     Prostate troubles    Hypertension     NSCLC of right  lung (Multi)     s/p SBRT that finished in Sept 2023 (08/2023-09/2023)    Pulmonary nodule     RUL    Snoring     Snoring    TIA (transient ischemic attack)    [2]   Past Surgical History:  Procedure Laterality Date    BRONCHOSCOPY      Bronchoscopy with biopsy showed adenocarcinoma.    COLONOSCOPY      CT ANGIO NECK  05/18/2017    CT NECK ANGIO W AND WO IV CONTRAST 5[place]18/2017 [facility]    CT HEAD ANGIO W AND WO IV CONTRAST  05/18/2017    CT HEAD ANGIO W AND WO IV CONTRAST 5/18/2017 [facility]   [3]   Family History  Problem Relation Name Age of Onset    Cancer Father     [4] [Held by provider] aspirin, 81 mg, oral, Daily  [Held by provider] budesonide, 0.5 mg, nebulization, BID  [Held by provider] finasteride, 5 mg, oral, Daily  [START ON 4/22/2025] tiotropium, 2 puff, inhalation, Daily   And  fluticasone furoate-vilanteroL, 1 puff, inhalation, Daily  pantoprazole, 40 mg, intravenous, BID  polyethylene glycol-electrolytes, 4,000 mL, oral, Once  [Held by provider] tamsulosin, 0.4 mg, oral, BID     [5]    [6] PRN medications: albuterol, albuterol, polyethylene glycol-electrolytes

## 2025-04-21 NOTE — PROGRESS NOTES
Pharmacy Medication History Review    Nadine Smith is a 80 y.o. adult admitted for Acute lower GI bleeding. Pharmacy reviewed the patient's [id number] medications and allergies for accuracy.    Medications ADDED:  None  Medications CHANGED:  None  Medications REMOVED:   Performist  Hydralazine 25, 50mg  Perocet     The list below reflects the updated PTA list.   Prior to Admission Medications   Prescriptions Last Dose Informant   Breztri Aerosphere 160-9-4.8 mcg/actuation HFA aerosol inhaler 2025 Morning Self, Spouse/Significant Other   Sig: INHALE 2 PUFFS 2 TIMES A DAY.   albuterol (Ventolin HFA) 90 mcg/actuation inhaler 2025 Morning Self, Spouse/Significant Other   Sig: Inhale 2 puffs every 4 hours if needed for wheezing or shortness of breath.   albuterol 2.5 mg /3 mL (0.083 %) nebulizer solution 2025 Self, Spouse/Significant Other   Sig: TAKE 3 ML (2.5 MG) BY NEBULIZATION 4 TIMES A DAY AS NEEDED FOR WHEEZING OR SHORTNESS OF BREATH.   amLODIPine (Norvasc) 10 mg tablet 2025 Self, Spouse/Significant Other   Sig: Take 1 tablet (10 mg) by mouth once daily.   aspirin 81 mg EC tablet 2025 Self, Spouse/Significant Other   Sig: Take 1 tablet (81 mg) by mouth once daily.   budesonide (Pulmicort) 0.5 mg/2 mL nebulizer solution Past Week Self, Spouse/Significant Other   Si VIAL INHALATION TWICE A DAY 90 DAYS   finasteride (Proscar) 5 mg tablet 2025 Self, Spouse/Significant Other   Sig: Take 1 tablet (5 mg) by mouth once daily.   tamsulosin (Flomax) 0.4 mg 24 hr capsule 2025 Self, Spouse/Significant Other   Sig: Take 1 capsule (0.4 mg) by mouth 2 times a day. 30 minutes after the same meal each day.      Facility-Administered Medications: None        The list below reflects the updated allergy list. Please review each documented allergy for additional clarification and justification.  Allergies  Reviewed by Hannah Moritz, RN on 2025   No Known Allergies    "      Patient declines M2B at discharge. Critical access hospital    Sources:   OAS  Pharmacy dispense history  Patient Interview Good historian  Spouse Good historian  Chart Review    Additional Comments:  Takes all medications above, does not take OTCs   Previously on Lipitor but no longer takes. Has recent fill hx from [facility] (possibly being auto-renewed)      Dajuan Mera, PharmD  Transitions of Care Pharmacist  04/21/25     Secure Chat preferred   If no response call [phone] or Vocera \"Med Rec\"    "

## 2025-04-22 ENCOUNTER — APPOINTMENT (OUTPATIENT)
Dept: RADIATION ONCOLOGY | Facility: HOSPITAL | Age: 80
End: 2025-04-22
Payer: MEDICARE

## 2025-04-22 ENCOUNTER — APPOINTMENT (OUTPATIENT)
Dept: HEMATOLOGY/ONCOLOGY | Facility: HOSPITAL | Age: 80
DRG: 378 | End: 2025-04-22
Payer: MEDICARE

## 2025-04-22 ENCOUNTER — APPOINTMENT (OUTPATIENT)
Dept: GASTROENTEROLOGY | Facility: HOSPITAL | Age: 80
DRG: 378 | End: 2025-04-22
Payer: MEDICARE

## 2025-04-22 LAB
ALBUMIN SERPL BCP-MCNC: 2.9 G/DL (ref 3.4–5)
ANION GAP SERPL CALC-SCNC: 9 MMOL/L (ref 10–20)
BASOPHILS # BLD AUTO: 0.05 X10*3/UL (ref 0–0.1)
BASOPHILS # BLD AUTO: 0.06 X10*3/UL (ref 0–0.1)
BASOPHILS NFR BLD AUTO: 0.9 %
BASOPHILS NFR BLD AUTO: 1 %
BLOOD EXPIRATION DATE: NORMAL
BUN SERPL-MCNC: 30 MG/DL (ref 6–23)
CALCIUM SERPL-MCNC: 7.9 MG/DL (ref 8.6–10.6)
CHLORIDE SERPL-SCNC: 112 MMOL/L (ref 98–107)
CO2 SERPL-SCNC: 25 MMOL/L (ref 21–32)
CREAT SERPL-MCNC: 0.88 MG/DL (ref 0.5–1.3)
DISPENSE STATUS: NORMAL
EGFRCR SERPLBLD CKD-EPI 2021: 66 ML/MIN/1.73M*2
EOSINOPHIL # BLD AUTO: 0.05 X10*3/UL (ref 0–0.4)
EOSINOPHIL # BLD AUTO: 0.07 X10*3/UL (ref 0–0.4)
EOSINOPHIL NFR BLD AUTO: 0.9 %
EOSINOPHIL NFR BLD AUTO: 1.2 %
ERYTHROCYTE [DISTWIDTH] IN BLOOD BY AUTOMATED COUNT: 14.5 % (ref 11.5–14.5)
ERYTHROCYTE [DISTWIDTH] IN BLOOD BY AUTOMATED COUNT: 15.1 % (ref 11.5–14.5)
GLUCOSE BLD MANUAL STRIP-MCNC: 74 MG/DL (ref 74–99)
GLUCOSE SERPL-MCNC: 76 MG/DL (ref 74–99)
HCT VFR BLD AUTO: 18.2 % (ref 36–52)
HCT VFR BLD AUTO: 27.4 % (ref 36–52)
HGB BLD-MCNC: 5.8 G/DL (ref 12–17.5)
HGB BLD-MCNC: 9 G/DL (ref 12–17.5)
IMM GRANULOCYTES # BLD AUTO: 0.02 X10*3/UL (ref 0–0.5)
IMM GRANULOCYTES # BLD AUTO: 0.03 X10*3/UL (ref 0–0.5)
IMM GRANULOCYTES NFR BLD AUTO: 0.3 % (ref 0–0.9)
IMM GRANULOCYTES NFR BLD AUTO: 0.5 % (ref 0–0.9)
LYMPHOCYTES # BLD AUTO: 0.79 X10*3/UL (ref 0.8–3)
LYMPHOCYTES # BLD AUTO: 1.16 X10*3/UL (ref 0.8–3)
LYMPHOCYTES NFR BLD AUTO: 13.4 %
LYMPHOCYTES NFR BLD AUTO: 20.1 %
MAGNESIUM SERPL-MCNC: 2.25 MG/DL (ref 1.6–2.4)
MCH RBC QN AUTO: 26 PG (ref 26–34)
MCH RBC QN AUTO: 26.6 PG (ref 26–34)
MCHC RBC AUTO-ENTMCNC: 31.9 G/DL (ref 32–36)
MCHC RBC AUTO-ENTMCNC: 32.8 G/DL (ref 32–36)
MCV RBC AUTO: 81 FL (ref 80–100)
MCV RBC AUTO: 82 FL (ref 80–100)
MONOCYTES # BLD AUTO: 0.45 X10*3/UL (ref 0.05–0.8)
MONOCYTES # BLD AUTO: 0.46 X10*3/UL (ref 0.05–0.8)
MONOCYTES NFR BLD AUTO: 7.8 %
MONOCYTES NFR BLD AUTO: 7.8 %
NEUTROPHILS # BLD AUTO: 4 X10*3/UL (ref 1.6–5.5)
NEUTROPHILS # BLD AUTO: 4.5 X10*3/UL (ref 1.6–5.5)
NEUTROPHILS NFR BLD AUTO: 69.5 %
NEUTROPHILS NFR BLD AUTO: 76.6 %
NRBC BLD-RTO: 0 /100 WBCS (ref 0–0)
NRBC BLD-RTO: 0 /100 WBCS (ref 0–0)
PHOSPHATE SERPL-MCNC: 3 MG/DL (ref 2.5–4.9)
PLATELET # BLD AUTO: 124 X10*3/UL (ref 150–450)
PLATELET # BLD AUTO: 160 X10*3/UL (ref 150–450)
POTASSIUM SERPL-SCNC: 3.7 MMOL/L (ref 3.5–5.3)
PRODUCT BLOOD TYPE: 5100
PRODUCT CODE: NORMAL
RBC # BLD AUTO: 2.23 X10*6/UL (ref 4–5.9)
RBC # BLD AUTO: 3.38 X10*6/UL (ref 4–5.9)
SODIUM SERPL-SCNC: 142 MMOL/L (ref 136–145)
UNIT ABO: NORMAL
UNIT NUMBER: NORMAL
UNIT RH: NORMAL
UNIT VOLUME: 334
WBC # BLD AUTO: 5.8 X10*3/UL (ref 4.4–11.3)
WBC # BLD AUTO: 5.9 X10*3/UL (ref 4.4–11.3)
XM INTEP: NORMAL

## 2025-04-22 PROCEDURE — 7100000009 HC PHASE TWO TIME - INITIAL BASE CHARGE

## 2025-04-22 PROCEDURE — 36415 COLL VENOUS BLD VENIPUNCTURE: CPT

## 2025-04-22 PROCEDURE — 80069 RENAL FUNCTION PANEL: CPT

## 2025-04-22 PROCEDURE — 7100000010 HC PHASE TWO TIME - EACH INCREMENTAL 1 MINUTE

## 2025-04-22 PROCEDURE — 82947 ASSAY GLUCOSE BLOOD QUANT: CPT

## 2025-04-22 PROCEDURE — 1200000003 HC ONCOLOGY  ROOM WITH TELEMETRY DAILY

## 2025-04-22 PROCEDURE — 3700000012 HC SEDATION LEVEL 5+ TIME - INITIAL 15 MINUTES 5/> YEARS

## 2025-04-22 PROCEDURE — 2500000004 HC RX 250 GENERAL PHARMACY W/ HCPCS (ALT 636 FOR OP/ED): Mod: JZ

## 2025-04-22 PROCEDURE — 2500000002 HC RX 250 W HCPCS SELF ADMINISTERED DRUGS (ALT 637 FOR MEDICARE OP, ALT 636 FOR OP/ED)

## 2025-04-22 PROCEDURE — 2500000001 HC RX 250 WO HCPCS SELF ADMINISTERED DRUGS (ALT 637 FOR MEDICARE OP)

## 2025-04-22 PROCEDURE — 2500000004 HC RX 250 GENERAL PHARMACY W/ HCPCS (ALT 636 FOR OP/ED): Mod: JZ | Performed by: INTERNAL MEDICINE

## 2025-04-22 PROCEDURE — 83735 ASSAY OF MAGNESIUM: CPT

## 2025-04-22 PROCEDURE — 99223 1ST HOSP IP/OBS HIGH 75: CPT

## 2025-04-22 PROCEDURE — 85025 COMPLETE CBC W/AUTO DIFF WBC: CPT

## 2025-04-22 PROCEDURE — 0DJD8ZZ INSPECTION OF LOWER INTESTINAL TRACT, VIA NATURAL OR ARTIFICIAL OPENING ENDOSCOPIC: ICD-10-PCS | Performed by: STUDENT IN AN ORGANIZED HEALTH CARE EDUCATION/TRAINING PROGRAM

## 2025-04-22 PROCEDURE — 36430 TRANSFUSION BLD/BLD COMPNT: CPT

## 2025-04-22 PROCEDURE — P9040 RBC LEUKOREDUCED IRRADIATED: HCPCS

## 2025-04-22 PROCEDURE — 93005 ELECTROCARDIOGRAM TRACING: CPT

## 2025-04-22 PROCEDURE — 3700000013 HC SEDATION LEVEL 5+ TIME - EACH ADDITIONAL 15 MINUTES

## 2025-04-22 RX ORDER — MIDAZOLAM HYDROCHLORIDE 1 MG/ML
INJECTION, SOLUTION INTRAMUSCULAR; INTRAVENOUS AS NEEDED
Status: DISCONTINUED | OUTPATIENT
Start: 2025-04-22 | End: 2025-04-22 | Stop reason: HOSPADM

## 2025-04-22 RX ORDER — POLYETHYLENE GLYCOL 3350, SODIUM CHLORIDE, SODIUM BICARBONATE, POTASSIUM CHLORIDE 420; 11.2; 5.72; 1.48 G/4L; G/4L; G/4L; G/4L
4000 POWDER, FOR SOLUTION ORAL ONCE
Status: COMPLETED | OUTPATIENT
Start: 2025-04-22 | End: 2025-04-22

## 2025-04-22 RX ORDER — FENTANYL CITRATE 50 UG/ML
INJECTION, SOLUTION INTRAMUSCULAR; INTRAVENOUS AS NEEDED
Status: DISCONTINUED | OUTPATIENT
Start: 2025-04-22 | End: 2025-04-22 | Stop reason: HOSPADM

## 2025-04-22 RX ADMIN — POLYETHYLENE GLYCOL 3350, SODIUM SULFATE ANHYDROUS, SODIUM BICARBONATE, SODIUM CHLORIDE, POTASSIUM CHLORIDE 4000 ML: 236; 22.74; 6.74; 5.86; 2.97 POWDER, FOR SOLUTION ORAL at 00:15

## 2025-04-22 RX ADMIN — POLYETHYLENE GLYCOL 3350, SODIUM SULFATE ANHYDROUS, SODIUM BICARBONATE, SODIUM CHLORIDE, POTASSIUM CHLORIDE 4000 ML: 236; 22.74; 6.74; 5.86; 2.97 POWDER, FOR SOLUTION ORAL at 09:36

## 2025-04-22 RX ADMIN — PANTOPRAZOLE SODIUM 40 MG: 40 INJECTION, POWDER, FOR SOLUTION INTRAVENOUS at 21:21

## 2025-04-22 RX ADMIN — MIDAZOLAM 2 MG: 1 INJECTION INTRAMUSCULAR; INTRAVENOUS at 16:05

## 2025-04-22 RX ADMIN — FENTANYL CITRATE 25 MCG: 50 INJECTION, SOLUTION INTRAMUSCULAR; INTRAVENOUS at 16:08

## 2025-04-22 RX ADMIN — ALBUTEROL SULFATE 2.5 MG: 2.5 SOLUTION RESPIRATORY (INHALATION) at 15:46

## 2025-04-22 RX ADMIN — FENTANYL CITRATE 50 MCG: 50 INJECTION, SOLUTION INTRAMUSCULAR; INTRAVENOUS at 16:05

## 2025-04-22 RX ADMIN — PANTOPRAZOLE SODIUM 40 MG: 40 INJECTION, POWDER, FOR SOLUTION INTRAVENOUS at 09:36

## 2025-04-22 RX ADMIN — MIDAZOLAM 1 MG: 1 INJECTION INTRAMUSCULAR; INTRAVENOUS at 16:08

## 2025-04-22 ASSESSMENT — PAIN - FUNCTIONAL ASSESSMENT
PAIN_FUNCTIONAL_ASSESSMENT: 0-10
PAIN_FUNCTIONAL_ASSESSMENT: UNABLE TO SELF-REPORT
PAIN_FUNCTIONAL_ASSESSMENT: 0-10
PAIN_FUNCTIONAL_ASSESSMENT: UNABLE TO SELF-REPORT
PAIN_FUNCTIONAL_ASSESSMENT: 0-10

## 2025-04-22 ASSESSMENT — PAIN SCALES - GENERAL
PAINLEVEL_OUTOF10: 0 - NO PAIN

## 2025-04-22 ASSESSMENT — COGNITIVE AND FUNCTIONAL STATUS - GENERAL
DRESSING REGULAR UPPER BODY CLOTHING: A LITTLE
HELP NEEDED FOR BATHING: A LITTLE
MOBILITY SCORE: 20
DRESSING REGULAR LOWER BODY CLOTHING: A LITTLE
PERSONAL GROOMING: A LITTLE
WALKING IN HOSPITAL ROOM: A LITTLE
EATING MEALS: A LITTLE
TOILETING: A LITTLE
MOVING TO AND FROM BED TO CHAIR: A LITTLE
EATING MEALS: A LITTLE
WALKING IN HOSPITAL ROOM: A LITTLE
HELP NEEDED FOR BATHING: A LITTLE
DAILY ACTIVITIY SCORE: 18
CLIMB 3 TO 5 STEPS WITH RAILING: A LITTLE
DRESSING REGULAR LOWER BODY CLOTHING: A LITTLE
TOILETING: A LITTLE
STANDING UP FROM CHAIR USING ARMS: A LITTLE
DAILY ACTIVITIY SCORE: 18
PERSONAL GROOMING: A LITTLE
MOBILITY SCORE: 20
DRESSING REGULAR UPPER BODY CLOTHING: A LITTLE
MOVING TO AND FROM BED TO CHAIR: A LITTLE
STANDING UP FROM CHAIR USING ARMS: A LITTLE
CLIMB 3 TO 5 STEPS WITH RAILING: A LITTLE

## 2025-04-22 ASSESSMENT — ACTIVITIES OF DAILY LIVING (ADL): LACK_OF_TRANSPORTATION: NO

## 2025-04-22 NOTE — PROGRESS NOTES
"Subjective     Nadine Smith is a 80 y.o. adult on day 1 of admission presenting with Acute lower GI bleeding.  Overnight: Patient was able to finish initial 4 L of prep overnight. Still having dark loose stools this morning.     This Morning: Patient states he is doing well. He is awake and alert. Wife is at bedside. No acute concerns or complaints. Agreeable to proceeding with additional bowel prep today for c-scope later this afternoon. Denies abd pain, chest pain, worsening SOB, recurrent LH/dizziness. Inquiring about going to proton therapy today.         Objective   Current Vitals  /74   Pulse 73   Temp 37 °C (98.6 °F) (Temporal)   Resp 16   Ht 1.778 m (5' 10\")   Wt 85.7 kg (189 lb)   SpO2 100%   BMI 27.12 kg/m²      I/O last 3 completed shifts:  In: 292.5 (3.4 mL/kg) [Blood:292.5]  Out: 650 (7.6 mL/kg) [Urine:650 (0.2 mL/kg/hr)]  Weight: 85.7 kg     Physical Exam  Vitals reviewed.   Constitutional:       General: She is not in acute distress.     Appearance: She is not ill-appearing.   HENT:      Head: Normocephalic and atraumatic.      Ears:      Comments: Hearing aids in place bilaterally     Nose: Nose normal.      Mouth/Throat:      Mouth: Mucous membranes are moist.      Pharynx: Oropharynx is clear.   Eyes:      Extraocular Movements: Extraocular movements intact.      Conjunctiva/sclera: Conjunctivae normal.   Cardiovascular:      Rate and Rhythm: Normal rate and regular rhythm.      Heart sounds: Normal heart sounds.   Pulmonary:      Effort: Pulmonary effort is normal.      Breath sounds: Normal breath sounds.   Abdominal:      General: Abdomen is flat. Bowel sounds are normal.      Palpations: Abdomen is soft.      Tenderness: There is no abdominal tenderness. There is no guarding or rebound.   Musculoskeletal:         General: Normal range of motion.      Cervical back: Normal range of motion and neck supple.      Right lower leg: Edema (+1 to the level of the knee) present.    "   Left lower leg: Edema (+1 to the level of the knee) present.   Skin:     General: Skin is warm and dry.   Neurological:      General: No focal deficit present.      Mental Status: She is alert and oriented to person, place, and time. Mental status is at baseline.   Psychiatric:         Mood and Affect: Mood normal.         Thought Content: Thought content normal.       Relevant Results  Labs:  CBC: WBC 5.8 , HGB 5.8,   BMP: , K 3.7, Cl 112, HCO3 25, BUN 30, CR 0.88, Glu 76  LFTS: AST 13 , ALT 7, ALKPHOS 38 , TBILI 0.7 , DBILI No results found for requested labs within last 365 days.  TROP: No results found for requested labs within last 365 days.  BNP: No results found for requested labs within last 365 days.  COAGS: PT 12.8 , PTT 27  , INR 1.2  UA:     ABG:    CALCIUM 7.9 MAG No results found for requested labs within last 365 days. ALB 2.9 LACTATE 0.5 PHOS No results found for requested labs within last 365 days. COVIDNo results found for requested labs within last 365 days.    Micro/culture data:  No results found for the last 120 days.      Imaging:  Vascular US lower extremity venous duplex bilateral              [place] [address]    Tel [phone] and Fax [phone]       Vascular Lab Report  [facility] US LOWER EXTREMITY VENOUS DUPLEX BILATERAL       Patient Name:      JORGE VERGARA        Reading Physician:  [id number] Nadine BRISCOE MD  Study Date:        4/18/2025           Ordering Physician: [id number] SUMMERS  MRN/PID:           [id number]            Technologist:       Lindsay Dave T  Accession#:        [id number]        Technologist 2:  Date of Birth/Age: 1945 / 80      Encounter#:         [id number]                     years  Gender:            M  Admission Status:  Outpatient          Location Performed:  [facility]       Diagnosis/ICD: Localized (leg) edema-R60.0  CPT Codes:     54696 Peripheral venous duplex scan for DVT complete       CONCLUSIONS:  Right Lower Venous: No evidence of acute deep vein thrombus visualized in the right lower extremity.  Left Lower Venous: No evidence of acute deep vein thrombus visualized in the left lower extremity.     Comparison:  Compared with study from 10/28/2024, no significant change.     Imaging & Doppler Findings:     Right                 Compressible Thrombus        Flow  Distal External Iliac     Yes        None   Spontaneous/Phasic  CFV                       Yes        None   Spontaneous/Phasic  PFV                       Yes        None  FV Proximal               Yes        None   Spontaneous/Phasic  FV Mid                    Yes        None  FV Distal                 Yes        None  Popliteal                 Yes        None   Spontaneous/Phasic  Peroneal                  Yes        None  PTV                       Yes        None       Left                  Compress Thrombus        Flow  Distal External Iliac   Yes      None   Spontaneous/Phasic  CFV                     Yes      None   Spontaneous/Phasic  PFV                     Yes      None  FV Proximal             Yes      None   Spontaneous/Phasic  FV Mid                  Yes      None  FV Distal               Yes      None  Popliteal               Yes      None   Spontaneous/Phasic  Peroneal                Yes      None  PTV                     Yes      None       [id number] Nadine Olmedo MD  Electronically signed by [id number] Nadine Olmedo MD on 4/18/2025 at 1:49:46 PM       ** Final **           MEDS:  Scheduled medications  Scheduled Medications[1]  Continuous medications  Continuous Medications[2]  PRN medications  PRN Medications[3]       Assessment/Plan   Assessment/Plan:   Nadine Smith is a 80 y.o. 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 Currently admitted for c/f GIB, GI planning for c-scope in the afternoon of 04/22. Repleting blood products as needed, pt remains hemodynamically stable, on room air.     Update 04/22/25:  - Colonoscopy today for investigation of GIB  - AM hbg 5.8 s/p 1 U pRBCs overnight, remained HDS  - s/p 2 additional units this morning, pending post-transfusion CBC (was drawn but lost by lab  - Will redraw when back from endo suite     # Dark red stools, hemoccult positive  # C/f LGIB  - Coloscopy in 2014 (age 69) with only 7 mm poly removed, rectal mucosal prolapse. Recommenced repeat in 5 years which was not performed.  - CT A/P 05/2024 without c/f bowel abnormalities  - PET/CT 01/20/2025 for eval of right upper lobe CA showing no PET evidence of distance metastasis  - Presenting to ED today for lightheadedness and dark stools (1 month, with 1 large episode earlier today as per HPI)  - While in ED found to have hbg 6.5 (drop from 13.2 in Feb 2025) and hemoccult positive exam  - Differentials include diverticular bleed VS possible malignant source, although recent PET/CT without evidence of large mass or c/f metastatic disease   - No hx suggestive of UGIB, less likely   PLAN:  - GI consulted, recs appreciated  - Plans for colonoscopy today, 04/22  - Maintain 2 large bore PIVs  - Transfuse for hbg > 7, plts > 50k goal  - Will consider obtaining abdominal imaging (CT A/P or CTA A/P) pending GI work-up   - Continue CBCs Q8 hrs while bleeding / until hbg stabilizes               - active T&S  - Continue PPI BID for now              - Per GI, likely LGIB ok to DC     # Lung adenocarcinoma currently receiving proton therapy  - S/p SBRT 2023 for [id number] stage  Ib poorly differentiated invasive lung adenocarcinoma  - Currently receiving proton therapy for RUL local recurrence of adenocarcinoma   - Follows with Dr. Jordan (primary oncologist) and Dr. Lee (Rad/onc)  PLAN:  - Dr. Jordan and Brandi Shin notified of admission  - Rad/Onc team aware of admission - planning for resumption of proton therapy 04/23     # Hx of COPD, severe  - PFTs with severe obstruction  - Remains on room air  PLAN:   - Sub home breztri for Spiriva/breo while in patient  - Albuterol PRN    # Hx of BL LE swelling, chronic   - Recently underwent DVT U/S ( 10/28/24 & 04/18/25) for chronic leg swelling, which were both negative  - Improved today on exam  PLAN:  - CTM     # Hx of HTN  - Hold home antihypertensives iso GIB (amlodipine 10 mg)      # BPH  - Hold tamsulosin for now iso softer BP / GIB  - Resumed finasteride    Misc:  Pt take ASA 81 daily - holding for GIB    Fluids: Replete PRN  Electrolytes: Keep mg >2, phos >3  and K >4  Nutrition:  NPO Diet; Effective now   Antimicrobials: none  DVT PPX:DVT: None  GI ppx: PPI BID  Bowel care:N/A  Catheter:None  Lines:PIV  Oxygen:Room Air    Code Status: Full Code (confirmed on admission)   NOK:  Primary Emergency Contact: Anna Smith [phone]      Lucy Thomas MD   Internal Medicine, PGY-1        [1] [Held by provider] aspirin, 81 mg, oral, Daily  [Held by provider] budesonide, 0.5 mg, nebulization, BID  [Held by provider] finasteride, 5 mg, oral, Daily  tiotropium, 2 puff, inhalation, Daily   And  fluticasone furoate-vilanteroL, 1 puff, inhalation, Daily  pantoprazole, 40 mg, intravenous, BID  [Held by provider] tamsulosin, 0.4 mg, oral, BID  [2]    [3] PRN medications: albuterol, albuterol, fentaNYL PF, midazolam, polyethylene glycol-electrolytes

## 2025-04-22 NOTE — SIGNIFICANT EVENT
Patient's telemetry strip completed w/ oncoming/off-going RN.   Strip reflected in the flowsheets and hard copy placed in patient's chart.     Patient's telemetry batteries changed.  Telemetry monitor turned back on and functioning appropriately.    Deana Porter, RN

## 2025-04-22 NOTE — CARE PLAN
The patient's goals for the shift include for the pt remain safe throughout the night 4/21/25 @0730    The clinical goals for the shift include Pt will have hgb >6.5 after 2 units of PRBC      Problem: Pain - Adult  Goal: Verbalizes/displays adequate comfort level or baseline comfort level  4/22/2025 1849 by Deana Tobias RN  Outcome: Progressing  4/22/2025 1849 by Deana Tobias RN  Outcome: Progressing  4/22/2025 1752 by Deana Tobias RN  Outcome: Progressing     Problem: Safety - Adult  Goal: Free from fall injury  4/22/2025 1849 by Deana Tobias RN  Outcome: Progressing  4/22/2025 1849 by Deana Tobias RN  Outcome: Progressing  4/22/2025 1752 by Deana Tobias RN  Outcome: Progressing     Problem: Discharge Planning  Goal: Discharge to home or other facility with appropriate resources  4/22/2025 1849 by Deana Tobias RN  Outcome: Progressing  4/22/2025 1849 by Deana Tobias RN  Outcome: Progressing  4/22/2025 1752 by Deana Tobias RN  Outcome: Progressing     Problem: Chronic Conditions and Co-morbidities  Goal: Patient's chronic conditions and co-morbidity symptoms are monitored and maintained or improved  4/22/2025 1849 by Deana Tobias RN  Outcome: Progressing  4/22/2025 1849 by Deana Tobias RN  Outcome: Progressing  4/22/2025 1752 by Deana Tobias RN  Outcome: Progressing     Problem: Nutrition  Goal: Nutrient intake appropriate for maintaining nutritional needs  4/22/2025 1849 by Deana Tobias RN  Outcome: Progressing  4/22/2025 1849 by Deana Tobias RN  Outcome: Progressing  4/22/2025 1752 by Deana Tobias RN  Outcome: Progressing     Problem: Fall/Injury  Goal: Not fall by end of shift  4/22/2025 1849 by Deana Tobias RN  Outcome: Progressing  4/22/2025 1849 by Deana Tobias RN  Outcome: Progressing  4/22/2025 1752 by Deana Tobias, RN  Outcome: Progressing  Goal:  Be free from injury by end of the shift  4/22/2025 1849 by Deana Tobias RN  Outcome: Progressing  4/22/2025 1849 by Deana Tobias RN  Outcome: Progressing  4/22/2025 1752 by Deana Tobias RN  Outcome: Progressing  Goal: Verbalize understanding of personal risk factors for fall in the hospital  4/22/2025 1849 by Deana Tobias, RN  Outcome: Progressing  4/22/2025 1849 by Deana Tobias RN  Outcome: Progressing  4/22/2025 1752 by Deana Tobias RN  Outcome: Progressing  Goal: Verbalize understanding of risk factor reduction measures to prevent injury from fall in the home  4/22/2025 1849 by Deana Tobias RN  Outcome: Progressing  4/22/2025 1849 by Deana Tobias RN  Outcome: Progressing  4/22/2025 1752 by Deana Tobias RN  Outcome: Progressing  Goal: Use assistive devices by end of the shift  4/22/2025 1849 by Deana Tobias, RN  Outcome: Progressing  4/22/2025 1849 by Deana Tobias, RN  Outcome: Progressing  4/22/2025 1752 by Deana Tobias RN  Outcome: Progressing  Goal: Pace activities to prevent fatigue by end of the shift  4/22/2025 1849 by Deana Tobias RN  Outcome: Progressing  4/22/2025 1849 by Deana Tobias, RN  Outcome: Progressing  4/22/2025 1752 by Deana Tobias RN  Outcome: Progressing     Problem: Skin  Goal: Participates in plan/prevention/treatment measures  Outcome: Progressing  Flowsheets (Taken 4/22/2025 1849)  Participates in plan/prevention/treatment measures: Elevate heels  Goal: Prevent/manage excess moisture  Outcome: Progressing  Flowsheets (Taken 4/22/2025 1849)  Prevent/manage excess moisture: Cleanse incontinence/protect with barrier cream  Goal: Promote/optimize nutrition  Outcome: Progressing  Flowsheets (Taken 4/22/2025 1849)  Promote/optimize nutrition: Offer water/supplements/favorite foods

## 2025-04-22 NOTE — PROGRESS NOTES
04/22/25 1000   Discharge Planning   Living Arrangements Spouse/significant other   Support Systems Spouse/significant other   Type of Residence Private residence   Number of Stairs to Enter Residence 0   Number of Stairs Within Residence 0   Do you have animals or pets at home? No   Home or Post Acute Services None   Expected Discharge Disposition Home   Does the patient need discharge transport arranged? No   Financial Resource Strain   How hard is it for you to pay for the very basics like food, housing, medical care, and heating? Not hard   Housing Stability   In the last 12 months, was there a time when you were not able to pay the mortgage or rent on time? N   At any time in the past 12 months, were you homeless or living in a shelter (including now)? N   Transportation Needs   In the past 12 months, has lack of transportation kept you from medical appointments or from getting medications? no   In the past 12 months, has lack of transportation kept you from meetings, work, or from getting things needed for daily living? No   Intensity of Service   Intensity of Service 0-30 min     Care Transitions Note  04/22/25  Plan per Medical/Surgical Team: lung cancer, GI bleed, scope today?  Status: Inpatient  Payor Source: [employer]  Discharge disposition: Home, medical plan pending  Expected date of discharge: TBD, few days/end of week    Met with pt and spouse at bedside, introduced self and role. Pt lives in an apartment in [place] on 4th floor. There are stairs to get in but pt comes in through lobby and takes elevator. Pt's wife works as a  in [employer]. When she works, she stays at their house in [place]. She states she does not work often, just [employer]. In their [place] house there are stairs down to the basement, but neither of them use these. Pt was independent prior to admission, able to complete ADLs and get around the house. No DME or O2 at home. No prior home care or skilled care. Pt  does not drive, pt's wife drives him everywhere. Pt is receiving proton therapy radiation through end of April, which wife takes him to. No SDOH concerns when prompted.   Plan of care pending, may need NG or dobhoff. SW to follow for dc planning.   Lisa Martinez, MSW, LSW

## 2025-04-22 NOTE — CARE PLAN
Problem: Pain - Adult  Goal: Verbalizes/displays adequate comfort level or baseline comfort level  Outcome: Progressing     Problem: Safety - Adult  Goal: Free from fall injury  Outcome: Progressing     Problem: Discharge Planning  Goal: Discharge to home or other facility with appropriate resources  Outcome: Progressing     Problem: Chronic Conditions and Co-morbidities  Goal: Patient's chronic conditions and co-morbidity symptoms are monitored and maintained or improved  Outcome: Progressing     Problem: Nutrition  Goal: Nutrient intake appropriate for maintaining nutritional needs  Outcome: Progressing     Problem: Fall/Injury  Goal: Not fall by end of shift  Outcome: Progressing  Goal: Be free from injury by end of the shift  Outcome: Progressing  Goal: Verbalize understanding of personal risk factors for fall in the hospital  Outcome: Progressing  Goal: Verbalize understanding of risk factor reduction measures to prevent injury from fall in the home  Outcome: Progressing  Goal: Use assistive devices by end of the shift  Outcome: Progressing  Goal: Pace activities to prevent fatigue by end of the shift  Outcome: Progressing   The patient's goals for the shift include for the pt remain safe throughout the night 4/21/25 @0730    The clinical goals for the shift include to keep pt comfortable    Pt able to tolerate bowel prep, for colonoscopy today . Pt received 1 unit of rbc started in th er and finished on the unit. Pt cbc drawn with a HGB coming back at a 5.8, MD notified. Pt has an order for 2 more units of Rbc to get today.

## 2025-04-22 NOTE — SIGNIFICANT EVENT
Pt underwent colonoscopy today with results as follows:    (Prelim report)  Impression  The terminal ileum, cecum, ascending colon, transverse colon and descending colon appeared normal.  Scattered diverticulosis of moderate severity in the sigmoid colon and rectosigmoid  Small hemorrhoids  No blood was observed.        Findings  The terminal ileum, cecum, ascending colon, transverse colon and descending colon appeared normal.  Multiple medium, scattered diverticula of moderate severity with no inflammation containing no content in the sigmoid colon and rectosigmoid; no bleeding was observed  Internal small hemorrhoids observed during retroflexion  No blood was observed.    Recommendations:  -Pt's hematochezia likely 2/2 diverticular bleed. Please continue supportive management per primary team.  - Maintain at least 2 large bore PIVs (18 gauge), or central line access.  - Transfuse PRBCs for target Hgb 7.  Transfuse platelets to goal >50k if active bleeding.    Thank you for the consultation.  The consulting team will sign off.  Please do not hesitate to contact us again on by paging the consultation team again between the weekday hours of 7 AM - 5 PM.  If there is an urgent concern during the weekend, after-hours, or holidays; then please page the on-call GI fellow at [phone]. Thank you.

## 2025-04-23 ENCOUNTER — HOSPITAL ENCOUNTER (OUTPATIENT)
Dept: RADIATION ONCOLOGY | Facility: HOSPITAL | Age: 80
Setting detail: RADIATION/ONCOLOGY SERIES
Discharge: HOME | End: 2025-04-23
Payer: MEDICARE

## 2025-04-23 VITALS
HEIGHT: 70 IN | OXYGEN SATURATION: 94 % | DIASTOLIC BLOOD PRESSURE: 64 MMHG | WEIGHT: 189 LBS | SYSTOLIC BLOOD PRESSURE: 125 MMHG | HEART RATE: 66 BPM | TEMPERATURE: 99 F | RESPIRATION RATE: 16 BRPM | BODY MASS INDEX: 27.06 KG/M2

## 2025-04-23 DIAGNOSIS — C34.11 MALIGNANT NEOPLASM OF UPPER LOBE, RIGHT BRONCHUS OR LUNG: ICD-10-CM

## 2025-04-23 DIAGNOSIS — Z51.0 ENCOUNTER FOR ANTINEOPLASTIC RADIATION THERAPY: ICD-10-CM

## 2025-04-23 LAB
ABO GROUP (TYPE) IN BLOOD: NORMAL
ALBUMIN SERPL BCP-MCNC: 3.1 G/DL (ref 3.4–5)
ANION GAP SERPL CALC-SCNC: 9 MMOL/L (ref 10–20)
ANTIBODY SCREEN: NORMAL
BASOPHILS # BLD AUTO: 0.03 X10*3/UL (ref 0–0.1)
BASOPHILS # BLD AUTO: 0.04 X10*3/UL (ref 0–0.1)
BASOPHILS NFR BLD AUTO: 0.6 %
BASOPHILS NFR BLD AUTO: 0.7 %
BLOOD EXPIRATION DATE: NORMAL
BLOOD EXPIRATION DATE: NORMAL
BUN SERPL-MCNC: 15 MG/DL (ref 6–23)
CALCIUM SERPL-MCNC: 7.7 MG/DL (ref 8.6–10.6)
CHLORIDE SERPL-SCNC: 110 MMOL/L (ref 98–107)
CO2 SERPL-SCNC: 26 MMOL/L (ref 21–32)
CREAT SERPL-MCNC: 0.78 MG/DL (ref 0.5–1.3)
DISPENSE STATUS: NORMAL
DISPENSE STATUS: NORMAL
EGFRCR SERPLBLD CKD-EPI 2021: 76 ML/MIN/1.73M*2
EOSINOPHIL # BLD AUTO: 0.12 X10*3/UL (ref 0–0.4)
EOSINOPHIL # BLD AUTO: 0.14 X10*3/UL (ref 0–0.4)
EOSINOPHIL NFR BLD AUTO: 2.3 %
EOSINOPHIL NFR BLD AUTO: 2.6 %
ERYTHROCYTE [DISTWIDTH] IN BLOOD BY AUTOMATED COUNT: 14.8 % (ref 11.5–14.5)
ERYTHROCYTE [DISTWIDTH] IN BLOOD BY AUTOMATED COUNT: 15.1 % (ref 11.5–14.5)
FERRITIN SERPL-MCNC: 32 NG/ML (ref 8–300)
FOLATE SERPL-MCNC: 3.7 NG/ML
GLUCOSE SERPL-MCNC: 95 MG/DL (ref 74–99)
HCT VFR BLD AUTO: 23.3 % (ref 36–52)
HCT VFR BLD AUTO: 26 % (ref 36–52)
HGB BLD-MCNC: 7.5 G/DL (ref 12–17.5)
HGB BLD-MCNC: 8.3 G/DL (ref 12–17.5)
HOLD SPECIMEN: NORMAL
IMM GRANULOCYTES # BLD AUTO: 0.02 X10*3/UL (ref 0–0.5)
IMM GRANULOCYTES # BLD AUTO: 0.03 X10*3/UL (ref 0–0.5)
IMM GRANULOCYTES NFR BLD AUTO: 0.4 % (ref 0–0.9)
IMM GRANULOCYTES NFR BLD AUTO: 0.6 % (ref 0–0.9)
IRON SATN MFR SERPL: 11 % (ref 25–45)
IRON SERPL-MCNC: 28 UG/DL (ref 35–150)
LYMPHOCYTES # BLD AUTO: 0.66 X10*3/UL (ref 0.8–3)
LYMPHOCYTES # BLD AUTO: 0.73 X10*3/UL (ref 0.8–3)
LYMPHOCYTES NFR BLD AUTO: 12.1 %
LYMPHOCYTES NFR BLD AUTO: 13.8 %
MAGNESIUM SERPL-MCNC: 2.2 MG/DL (ref 1.6–2.4)
MCH RBC QN AUTO: 26.6 PG (ref 26–34)
MCH RBC QN AUTO: 26.6 PG (ref 26–34)
MCHC RBC AUTO-ENTMCNC: 31.9 G/DL (ref 32–36)
MCHC RBC AUTO-ENTMCNC: 32.2 G/DL (ref 32–36)
MCV RBC AUTO: 83 FL (ref 80–100)
MCV RBC AUTO: 83 FL (ref 80–100)
MONOCYTES # BLD AUTO: 0.35 X10*3/UL (ref 0.05–0.8)
MONOCYTES # BLD AUTO: 0.53 X10*3/UL (ref 0.05–0.8)
MONOCYTES NFR BLD AUTO: 6.6 %
MONOCYTES NFR BLD AUTO: 9.7 %
NEUTROPHILS # BLD AUTO: 4.02 X10*3/UL (ref 1.6–5.5)
NEUTROPHILS # BLD AUTO: 4.07 X10*3/UL (ref 1.6–5.5)
NEUTROPHILS NFR BLD AUTO: 74.5 %
NEUTROPHILS NFR BLD AUTO: 76.1 %
NRBC BLD-RTO: 0 /100 WBCS (ref 0–0)
NRBC BLD-RTO: 0 /100 WBCS (ref 0–0)
PHOSPHATE SERPL-MCNC: 3.1 MG/DL (ref 2.5–4.9)
PLATELET # BLD AUTO: 144 X10*3/UL (ref 150–450)
PLATELET # BLD AUTO: 157 X10*3/UL (ref 150–450)
POTASSIUM SERPL-SCNC: 3.6 MMOL/L (ref 3.5–5.3)
PRODUCT BLOOD TYPE: 5100
PRODUCT BLOOD TYPE: 5100
PRODUCT CODE: NORMAL
PRODUCT CODE: NORMAL
RAD ONC MSQ ACTUAL FRACTIONS DELIVERED: 9
RAD ONC MSQ ACTUAL SESSION BIOLOGICAL DOSE: 400 CCGE
RAD ONC MSQ ACTUAL SESSION DELIVERED DOSE: 364 CGRAY
RAD ONC MSQ ACTUAL TOTAL BIOLOGICAL DOSE: 3604 CCGE
RAD ONC MSQ ACTUAL TOTAL DOSE: 3276 CGRAY
RAD ONC MSQ ELAPSED DAYS: 14
RAD ONC MSQ LAST DATE: NORMAL
RAD ONC MSQ PRESCRIBED BIOLOGICAL FRACTIONAL DOSE: 400 CCGE
RAD ONC MSQ PRESCRIBED BIOLOGICAL TOTAL DOSE: 6000 CCGE
RAD ONC MSQ PRESCRIBED FRACTIONAL DOSE: 364 CGRAY
RAD ONC MSQ PRESCRIBED NUMBER OF FRACTIONS: 15
RAD ONC MSQ PRESCRIBED TECHNIQUE: NORMAL
RAD ONC MSQ PRESCRIBED TOTAL DOSE: 5455 CGRAY
RAD ONC MSQ PRESCRIPTION PATTERN COMMENT: NORMAL
RAD ONC MSQ START DATE: NORMAL
RAD ONC MSQ TREATMENT COURSE NUMBER: 2
RAD ONC MSQ TREATMENT SITE: NORMAL
RBC # BLD AUTO: 2.82 X10*6/UL (ref 4–5.9)
RBC # BLD AUTO: 3.12 X10*6/UL (ref 4–5.9)
RH FACTOR (ANTIGEN D): NORMAL
SODIUM SERPL-SCNC: 141 MMOL/L (ref 136–145)
TIBC SERPL-MCNC: 253 UG/DL (ref 240–445)
UIBC SERPL-MCNC: 225 UG/DL (ref 110–370)
UNIT ABO: NORMAL
UNIT ABO: NORMAL
UNIT NUMBER: NORMAL
UNIT NUMBER: NORMAL
UNIT RH: NORMAL
UNIT RH: NORMAL
UNIT VOLUME: 350
UNIT VOLUME: 350
WBC # BLD AUTO: 5.3 X10*3/UL (ref 4.4–11.3)
WBC # BLD AUTO: 5.5 X10*3/UL (ref 4.4–11.3)
XM INTEP: NORMAL
XM INTEP: NORMAL

## 2025-04-23 PROCEDURE — 36415 COLL VENOUS BLD VENIPUNCTURE: CPT

## 2025-04-23 PROCEDURE — 77387 GUIDANCE FOR RADJ TX DLVR: CPT | Performed by: RADIOLOGY

## 2025-04-23 PROCEDURE — 77523 PROTON TRMT INTERMEDIATE: CPT | Performed by: RADIOLOGY

## 2025-04-23 PROCEDURE — 2500000001 HC RX 250 WO HCPCS SELF ADMINISTERED DRUGS (ALT 637 FOR MEDICARE OP)

## 2025-04-23 PROCEDURE — 85025 COMPLETE CBC W/AUTO DIFF WBC: CPT

## 2025-04-23 PROCEDURE — 99239 HOSP IP/OBS DSCHRG MGMT >30: CPT

## 2025-04-23 PROCEDURE — 83921 ORGANIC ACID SINGLE QUANT: CPT

## 2025-04-23 PROCEDURE — 94640 AIRWAY INHALATION TREATMENT: CPT

## 2025-04-23 PROCEDURE — 77427 RADIATION TX MANAGEMENT X5: CPT | Performed by: RADIOLOGY

## 2025-04-23 PROCEDURE — 83540 ASSAY OF IRON: CPT

## 2025-04-23 PROCEDURE — 82746 ASSAY OF FOLIC ACID SERUM: CPT

## 2025-04-23 PROCEDURE — 83735 ASSAY OF MAGNESIUM: CPT

## 2025-04-23 PROCEDURE — 86901 BLOOD TYPING SEROLOGIC RH(D): CPT

## 2025-04-23 PROCEDURE — 2500000004 HC RX 250 GENERAL PHARMACY W/ HCPCS (ALT 636 FOR OP/ED): Mod: JZ

## 2025-04-23 PROCEDURE — 82728 ASSAY OF FERRITIN: CPT

## 2025-04-23 PROCEDURE — 80069 RENAL FUNCTION PANEL: CPT

## 2025-04-23 RX ORDER — AMMONIUM LACTATE 12 G/100G
1 LOTION TOPICAL 2 TIMES DAILY
COMMUNITY

## 2025-04-23 RX ORDER — UMECLIDINIUM BROMIDE AND VILANTEROL TRIFENATATE 62.5; 25 UG/1; UG/1
1 POWDER RESPIRATORY (INHALATION) DAILY
COMMUNITY

## 2025-04-23 RX ORDER — ATORVASTATIN CALCIUM 10 MG/1
10 TABLET, FILM COATED ORAL DAILY
COMMUNITY

## 2025-04-23 RX ORDER — NYSTATIN 100000 [USP'U]/G
1 POWDER TOPICAL 2 TIMES DAILY
COMMUNITY

## 2025-04-23 RX ADMIN — PANTOPRAZOLE SODIUM 40 MG: 40 INJECTION, POWDER, FOR SOLUTION INTRAVENOUS at 09:21

## 2025-04-23 RX ADMIN — TIOTROPIUM BROMIDE INHALATION SPRAY 2 PUFF: 3.12 SPRAY, METERED RESPIRATORY (INHALATION) at 10:30

## 2025-04-23 RX ADMIN — FLUTICASONE FUROATE AND VILANTEROL TRIFENATATE 1 PUFF: 100; 25 POWDER RESPIRATORY (INHALATION) at 10:32

## 2025-04-23 RX ADMIN — FINASTERIDE 5 MG: 5 TABLET, FILM COATED ORAL at 09:21

## 2025-04-23 ASSESSMENT — PAIN SCALES - GENERAL: PAINLEVEL_OUTOF10: 0 - NO PAIN

## 2025-04-23 ASSESSMENT — ENCOUNTER SYMPTOMS
LOSS OF SENSATION IN FEET: 0
DEPRESSION: 0
OCCASIONAL FEELINGS OF UNSTEADINESS: 0

## 2025-04-23 ASSESSMENT — PAIN - FUNCTIONAL ASSESSMENT: PAIN_FUNCTIONAL_ASSESSMENT: 0-10

## 2025-04-23 NOTE — DOCUMENTATION CLARIFICATION NOTE
"    PATIENT:               JORGE BRISCOE  ACCT #:                  [id number]  MRN:                       [id number]  :                       1945  ADMIT DATE:       2025 2:33 PM  DISCH DATE:  RESPONDING PROVIDER #:        [id number]          PROVIDER RESPONSE TEXT:    Acute blood loss anemia    CDI QUERY TEXT:    Clarification        Instruction:    Based on your assessment of the patient and the clinical information, please provide the requested documentation by clicking on the appropriate radio button and enter any additional information if prompted.    Question: Please further clarify the diagnosis of anemia as    When answering this query, please exercise your independent professional judgment. The fact that a question is being asked, does not imply that any particular answer is desired or expected.    The patient's clinical indicators include:  Clinical Information: 81 yo M with hematochezia and acute anemia.    Clinical Indicators:  Vitals  at 1117: T37.5, HR 87, RR 16, /58, O2 99%RA    Labs -:  RBC: 2.61, 2.23, 3.38, 2.82  Hgb: 6.5, 5.8, 9.0, 7.5  Hct: 19.7, 18.2, 27.4, 23.3      Consult- GI:  ?-Hematochezia x1week, acute anemia?     PN- Oncology:  AM hbg 5.8 s/p 1 U pRBCs overnight, remained HDS  - s/p 2 additional units this morning     Significant Event- GI:  \" -Pt's hematochezia likely 2/2 diverticular bleed\"    Treatment:  -labs  -transfused 3 units pRBCs    Risk Factors:  -hematochezia  -likely diverticular bleed  -anemia  Options provided:  -- Acute blood loss anemia  -- Other - I will add my own diagnosis  -- Refer to Clinical Documentation Reviewer    Query created by: Shilpa Gallegos on 2025 1:18 PM      Electronically signed by:  KADY SR MD 2025 1:25 PM          "

## 2025-04-23 NOTE — DISCHARGE INSTRUCTIONS
Dear Nadine Smith,    You were admitted to [facility] from 04/21 to 04/23 for lightheadedness and dark/bloody stools. Upon admission, your hemoglobin (blood count) was found to be low (6.5). Throughout admission, you were given blood transfusions to help replete your hemoglobin. After you were given 3 blood transfusions, your hemoglobin remained stable. You were evaluated by the Gastroenterology team and underwent a colonoscopy on 04/22, which showed diverticulosis (small out-pouches of the colon) but no active bleeding. The colonoscopy did not show any masses, polyps, or concern for cancer in your colon. You did not have any additional bleeding after you finished the bowel prep. It is likely that one of these diverticula were the cause of your bleeding, which is a common cause of gastrointestinal bleeding in people who have diverticulosis.    Medication changes:  Please stop aspirin 81 mg daily  Otherwise, please continue your other home medications as you previously were    Appointments/follow-up:  Please continue to follow-up with your Oncology and Radiation/Oncology providers as scheduled    If you have not received a call to schedule and appointment within 2 days, please call [phone]. Please bring with you to the appointment a photo ID and insurance card. Please also bring a list of all of the medications that you are currently taking to this appointment as well so these can be reviewed.     It was a pleasure taking care of you,  Your  Care Team

## 2025-04-23 NOTE — DISCHARGE SUMMARY
Discharge Diagnosis  Acute lower GI bleeding  Diverticulosis   Acute blood loss anemia 2/2 GI bleed       Issues Requiring Follow-Up  - Continue to follow-up with Oncology and Radiation/Oncology as scheduled, Dr. Jordan (primary oncologist) and Dr. Lee (Rad/onc)   - Follow-up with Pulmonology for COPD management    Discharge Meds     Medication List      CONTINUE taking these medications     * albuterol 90 mcg/actuation inhaler; Commonly known as: Ventolin HFA;   Inhale 2 puffs every 4 hours if needed for wheezing or shortness of   breath.   * albuterol 2.5 mg /3 mL (0.083 %) nebulizer solution; TAKE 3 ML (2.5   MG) BY NEBULIZATION 4 TIMES A DAY AS NEEDED FOR WHEEZING OR SHORTNESS OF   BREATH.   amLODIPine 10 mg tablet; Commonly known as: Norvasc   ammonium lactate 12 % lotion; Commonly known as: Lac-Hydrin   Anoro Ellipta 62.5-25 mcg/actuation blister with inhaler device; Generic   drug: umeclidinium-vilanteroL   atorvastatin 10 mg tablet; Commonly known as: Lipitor   Breztri Aerosphere 160-9-4.8 mcg/actuation HFA aerosol inhaler; Generic   drug: budesonide-glycopyr-formoterol; INHALE 2 PUFFS 2 TIMES A DAY.   budesonide 0.5 mg/2 mL nebulizer solution; Commonly known as: Pulmicort   finasteride 5 mg tablet; Commonly known as: Proscar   nystatin 100,000 unit/gram powder; Commonly known as: Mycostatin   tamsulosin 0.4 mg 24 hr capsule; Commonly known as: Flomax  * This list has 2 medication(s) that are the same as other medications   prescribed for you. Read the directions carefully, and ask your doctor or   other care provider to review them with you.     STOP taking these medications     aspirin 81 mg EC tablet       Test Results Pending At Discharge  Pending Labs       Order Current Status    Methylmalonic acid, serum In process            Hospital Course  Nadine Smith is a 80 y.o. 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     He was found to have hbg of 6.5 on presentation (drop from 13.2 in Feb 2025) with hemoccult positive stools. He was admitted for further work-up. He underwent colonoscopy on 04/22025 which revealed no active bleeding, sigmoid diverticulosis.     Throughout admission he received a total of 3 U pRBCs, with remaining CBCs following transfusions showing hbg of 9.0 -> 7.5 -> 8.3. He remained hemodynamically stable. He had no further bleeding after bowel prep was completed. It is likely that the cause of his brisk GI bleeding episode was secondary to an acute diverticular bleed.     Due to colonoscopy coordination, he did miss proton therapy 04/22 while inpatient. He was discharged on 04/23 to home following proton therapy session. The only medication change was discontinuation of daily baby aspirin.    To Do:  - Continue to follow-up with Oncology and Radiation/Oncology as scheduled, Dr. Jordan (primary oncologist) and Dr. Lee (Rad/onc)   - Follow-up with Pulmonology for COPD management    Pertinent Physical Exam At Time of Discharge  Visit Vitals  /64 (BP Location: Left arm, Patient Position: Sitting)   Pulse 66   Temp 37.2 °C (99 °F) (Temporal)   Resp 16   Ht 1.778 m (5' 10\")   Wt 85.7 kg (189 lb)   SpO2 94%   BMI 27.12 kg/m²   Smoking Status Former   BSA 2.06 m²      Vitals reviewed.   Constitutional:       General: He is not in acute distress.     Appearance: He is not ill-appearing.   HENT:      Head: Normocephalic and atraumatic.      Ears:      Comments: Hearing aids in place bilaterally     Nose: Nose normal.      Mouth/Throat:      Mouth: Mucous membranes are moist.      Pharynx: Oropharynx is clear.   Eyes:      Extraocular Movements: Extraocular movements intact.      Conjunctiva/sclera: Conjunctivae normal. "   Cardiovascular:      Rate and Rhythm: Normal rate and regular rhythm.      Heart sounds: Normal heart sounds.   Pulmonary:      Effort: Pulmonary effort is normal.      Breath sounds: Normal breath sounds.   Abdominal:      General: Abdomen is flat.       Palpations: Abdomen is soft.      Tenderness: There is no abdominal tenderness. There is no guarding or rebound.   Musculoskeletal:         General: Normal range of motion.      Cervical back: Normal range of motion and neck supple.      Right lower leg: Edema (+1 to the level of the knee) present.      Left lower leg: Edema (+1 to the level of the knee) present.   Skin:     General: Skin is warm and dry.   Neurological:      General: No focal deficit present.      Mental Status: He is alert and oriented to person, place, and time. Mental status is at baseline.   Psychiatric:         Mood and Affect: Mood normal.         Thought Content: Thought content normal.    Outpatient Follow-Up  Future Appointments   Date Time Provider Department Center   4/24/2025  2:30 PM [facility]   4/25/2025  2:30 PM [facility]   4/28/2025  1:30 PM ARIEL SUMMERS [facility]   4/28/2025  2:15 PM [facility]   4/29/2025  3:15 PM [facility]   4/30/2025  2:45 PM [facility]   5/1/2025  2:30 PM [facility]   9/4/2025  3:15 PM Lynne Talamantes MD [id number]     Lucy Linsey Thomas MD  Internal Medicine, PGY-1

## 2025-04-23 NOTE — CARE PLAN
Problem: Pain - Adult  Goal: Verbalizes/displays adequate comfort level or baseline comfort level  Outcome: Progressing     Problem: Safety - Adult  Goal: Free from fall injury  Outcome: Progressing     Problem: Discharge Planning  Goal: Discharge to home or other facility with appropriate resources  Outcome: Progressing     Problem: Chronic Conditions and Co-morbidities  Goal: Patient's chronic conditions and co-morbidity symptoms are monitored and maintained or improved  Outcome: Progressing     Problem: Nutrition  Goal: Nutrient intake appropriate for maintaining nutritional needs  Outcome: Progressing     Problem: Fall/Injury  Goal: Not fall by end of shift  Outcome: Progressing  Goal: Be free from injury by end of the shift  Outcome: Progressing  Goal: Verbalize understanding of personal risk factors for fall in the hospital  Outcome: Progressing  Goal: Verbalize understanding of risk factor reduction measures to prevent injury from fall in the home  Outcome: Progressing  Goal: Use assistive devices by end of the shift  Outcome: Progressing  Goal: Pace activities to prevent fatigue by end of the shift  Outcome: Progressing     Problem: Skin  Goal: Participates in plan/prevention/treatment measures  Outcome: Progressing  Flowsheets (Taken 4/22/2025 8069 by Deana Tobias RN)  Participates in plan/prevention/treatment measures: Elevate heels  Goal: Prevent/manage excess moisture  Outcome: Progressing  Flowsheets (Taken 4/23/2025 0609)  Prevent/manage excess moisture:   Moisturize dry skin   Monitor for/manage infection if present  Goal: Promote/optimize nutrition  Outcome: Progressing  Flowsheets (Taken 4/23/2025 0609)  Promote/optimize nutrition:   Assist with feeding   Offer water/supplements/favorite foods   The patient's goals for the shift include for the pt remain safe throughout the night 4/21/25 @0730    The clinical goals for the shift include pt to remain safe throughout the night 4/22/25  @5291

## 2025-04-24 ENCOUNTER — HOSPITAL ENCOUNTER (OUTPATIENT)
Dept: RADIATION ONCOLOGY | Facility: HOSPITAL | Age: 80
Setting detail: RADIATION/ONCOLOGY SERIES
Discharge: HOME | End: 2025-04-24
Payer: MEDICARE

## 2025-04-24 DIAGNOSIS — C34.11 MALIGNANT NEOPLASM OF UPPER LOBE, RIGHT BRONCHUS OR LUNG: ICD-10-CM

## 2025-04-24 DIAGNOSIS — Z51.0 ENCOUNTER FOR ANTINEOPLASTIC RADIATION THERAPY: ICD-10-CM

## 2025-04-24 LAB
RAD ONC MSQ ACTUAL FRACTIONS DELIVERED: 10
RAD ONC MSQ ACTUAL SESSION BIOLOGICAL DOSE: 400 CCGE
RAD ONC MSQ ACTUAL SESSION DELIVERED DOSE: 364 CGRAY
RAD ONC MSQ ACTUAL TOTAL BIOLOGICAL DOSE: 4004 CCGE
RAD ONC MSQ ACTUAL TOTAL DOSE: 3640 CGRAY
RAD ONC MSQ ELAPSED DAYS: 15
RAD ONC MSQ LAST DATE: NORMAL
RAD ONC MSQ PRESCRIBED BIOLOGICAL FRACTIONAL DOSE: 400 CCGE
RAD ONC MSQ PRESCRIBED BIOLOGICAL TOTAL DOSE: 6000 CCGE
RAD ONC MSQ PRESCRIBED FRACTIONAL DOSE: 364 CGRAY
RAD ONC MSQ PRESCRIBED NUMBER OF FRACTIONS: 15
RAD ONC MSQ PRESCRIBED TECHNIQUE: NORMAL
RAD ONC MSQ PRESCRIBED TOTAL DOSE: 5455 CGRAY
RAD ONC MSQ PRESCRIPTION PATTERN COMMENT: NORMAL
RAD ONC MSQ START DATE: NORMAL
RAD ONC MSQ TREATMENT COURSE NUMBER: 2
RAD ONC MSQ TREATMENT SITE: NORMAL

## 2025-04-24 PROCEDURE — 77387 GUIDANCE FOR RADJ TX DLVR: CPT | Performed by: RADIOLOGY

## 2025-04-24 PROCEDURE — 77523 PROTON TRMT INTERMEDIATE: CPT | Performed by: RADIOLOGY

## 2025-04-24 NOTE — HOSPITAL COURSE
Nadine Smith is a 80 y.o. adult male with PMHx of HTN, COPD (previous 63-pack-year smoker), BPH, and lung adenocarcinoma ([id number] stage Ib poorly differentiated invasive lung adenocarcinoma s/p SBRT 2023, local recurrence in RUL 2/2025 currently receiving proton therapy) who presented on 04/21 for c/o lightheadedness and dark stools.     He was found to have hbg of 6.5 on presentation (drop from 13.2 in Feb 2025) with hemoccult positive stools. He was admitted for further work-up. He underwent colonoscopy on 04/22025 which revealed no active bleeding, sigmoid diverticulosis.     Throughout admission he received a total of 3 U pRBCs, with remaining CBCs following transfusions showing hbg of 9.0 -> 7.5 -> 8.3. He remained hemodynamically stable. He had no further bleeding after bowel prep was completed. It is likely that the cause of his brisk GI bleeding episode was secondary to an acute diverticular bleed.     Due to colonoscopy coordination, he did miss proton therapy 04/22 while inpatient. He was discharged on 04/23 to home following proton therapy session. The only medication change was discontinuation of daily baby aspirin.    To Do:  - Continue to follow-up with Oncology and Radiation/Oncology as scheduled, Dr. Jordan (primary oncologist) and Dr. Lee (Rad/onc)   - Follow-up with Pulmonology for COPD management

## 2025-04-25 ENCOUNTER — HOSPITAL ENCOUNTER (OUTPATIENT)
Dept: RADIATION ONCOLOGY | Facility: HOSPITAL | Age: 80
Setting detail: RADIATION/ONCOLOGY SERIES
Discharge: HOME | End: 2025-04-25
Payer: MEDICARE

## 2025-04-25 DIAGNOSIS — Z51.0 ENCOUNTER FOR ANTINEOPLASTIC RADIATION THERAPY: ICD-10-CM

## 2025-04-25 DIAGNOSIS — C34.11 MALIGNANT NEOPLASM OF UPPER LOBE, RIGHT BRONCHUS OR LUNG: ICD-10-CM

## 2025-04-25 LAB
RAD ONC MSQ ACTUAL FRACTIONS DELIVERED: 11
RAD ONC MSQ ACTUAL SESSION BIOLOGICAL DOSE: 400 CCGE
RAD ONC MSQ ACTUAL SESSION DELIVERED DOSE: 364 CGRAY
RAD ONC MSQ ACTUAL TOTAL BIOLOGICAL DOSE: 4404 CCGE
RAD ONC MSQ ACTUAL TOTAL DOSE: 4004 CGRAY
RAD ONC MSQ ELAPSED DAYS: 16
RAD ONC MSQ LAST DATE: NORMAL
RAD ONC MSQ PRESCRIBED BIOLOGICAL FRACTIONAL DOSE: 400 CCGE
RAD ONC MSQ PRESCRIBED BIOLOGICAL TOTAL DOSE: 6000 CCGE
RAD ONC MSQ PRESCRIBED FRACTIONAL DOSE: 364 CGRAY
RAD ONC MSQ PRESCRIBED NUMBER OF FRACTIONS: 15
RAD ONC MSQ PRESCRIBED TECHNIQUE: NORMAL
RAD ONC MSQ PRESCRIBED TOTAL DOSE: 5455 CGRAY
RAD ONC MSQ PRESCRIPTION PATTERN COMMENT: NORMAL
RAD ONC MSQ START DATE: NORMAL
RAD ONC MSQ TREATMENT COURSE NUMBER: 2
RAD ONC MSQ TREATMENT SITE: NORMAL

## 2025-04-25 PROCEDURE — 77336 RADIATION PHYSICS CONSULT: CPT | Performed by: RADIOLOGY

## 2025-04-25 PROCEDURE — 77523 PROTON TRMT INTERMEDIATE: CPT | Performed by: RADIOLOGY

## 2025-04-25 PROCEDURE — 77387 GUIDANCE FOR RADJ TX DLVR: CPT

## 2025-04-27 LAB — METHYLMALONATE SERPL-SCNC: 0.1 UMOL/L (ref 0–0.4)

## 2025-04-28 ENCOUNTER — HOSPITAL ENCOUNTER (OUTPATIENT)
Dept: RADIATION ONCOLOGY | Facility: HOSPITAL | Age: 80
Setting detail: RADIATION/ONCOLOGY SERIES
Discharge: HOME | End: 2025-04-28
Payer: MEDICARE

## 2025-04-28 VITALS
RESPIRATION RATE: 16 BRPM | DIASTOLIC BLOOD PRESSURE: 83 MMHG | BODY MASS INDEX: 26.49 KG/M2 | HEART RATE: 83 BPM | OXYGEN SATURATION: 96 % | WEIGHT: 184.6 LBS | SYSTOLIC BLOOD PRESSURE: 156 MMHG

## 2025-04-28 DIAGNOSIS — C34.91 NON-SMALL CELL CANCER OF RIGHT LUNG (MULTI): Primary | ICD-10-CM

## 2025-04-28 DIAGNOSIS — C34.11 MALIGNANT NEOPLASM OF UPPER LOBE, RIGHT BRONCHUS OR LUNG: ICD-10-CM

## 2025-04-28 DIAGNOSIS — K92.2 ACUTE LOWER GI BLEEDING: ICD-10-CM

## 2025-04-28 DIAGNOSIS — Z51.0 ENCOUNTER FOR ANTINEOPLASTIC RADIATION THERAPY: ICD-10-CM

## 2025-04-28 LAB
RAD ONC MSQ ACTUAL FRACTIONS DELIVERED: 12
RAD ONC MSQ ACTUAL SESSION BIOLOGICAL DOSE: 400 CCGE
RAD ONC MSQ ACTUAL SESSION DELIVERED DOSE: 364 CGRAY
RAD ONC MSQ ACTUAL TOTAL BIOLOGICAL DOSE: 4805 CCGE
RAD ONC MSQ ACTUAL TOTAL DOSE: 4368 CGRAY
RAD ONC MSQ ELAPSED DAYS: 19
RAD ONC MSQ LAST DATE: NORMAL
RAD ONC MSQ PRESCRIBED BIOLOGICAL FRACTIONAL DOSE: 400 CCGE
RAD ONC MSQ PRESCRIBED BIOLOGICAL TOTAL DOSE: 6000 CCGE
RAD ONC MSQ PRESCRIBED FRACTIONAL DOSE: 364 CGRAY
RAD ONC MSQ PRESCRIBED NUMBER OF FRACTIONS: 15
RAD ONC MSQ PRESCRIBED TECHNIQUE: NORMAL
RAD ONC MSQ PRESCRIBED TOTAL DOSE: 5455 CGRAY
RAD ONC MSQ PRESCRIPTION PATTERN COMMENT: NORMAL
RAD ONC MSQ START DATE: NORMAL
RAD ONC MSQ TREATMENT COURSE NUMBER: 2
RAD ONC MSQ TREATMENT SITE: NORMAL

## 2025-04-28 PROCEDURE — 77523 PROTON TRMT INTERMEDIATE: CPT

## 2025-04-28 PROCEDURE — 77387 GUIDANCE FOR RADJ TX DLVR: CPT

## 2025-04-28 RX ORDER — HYDRALAZINE HYDROCHLORIDE 50 MG/1
TABLET, FILM COATED ORAL
COMMUNITY
Start: 2025-04-24

## 2025-04-28 ASSESSMENT — PAIN SCALES - GENERAL: PAINLEVEL_OUTOF10: 0-NO PAIN

## 2025-04-28 ASSESSMENT — ENCOUNTER SYMPTOMS
LOSS OF SENSATION IN FEET: 0
DEPRESSION: 0
OCCASIONAL FEELINGS OF UNSTEADINESS: 1

## 2025-04-28 NOTE — PROGRESS NOTES
Radiation Oncology On Treatment Visit    Patient Name:  Nadine Smith  MRN:  [id number]  :  1945    Referring Provider: Thai Jordan MD  Primary Care Provider: Loree Munoz MD  Care Team:   Patient Care Team:  Loree Munoz MD as PCP - General  Fernando Encarnacion MD as Consulting Physician (Hematology and Oncology)  Thai Jordan MD as Consulting Physician (Hematology and Oncology)    Date of Service: 2025     Diagnosis:   Specialty Problems    None    Treatment Summary: Dose below in cGy. Actual Photon equivalent dose is 1.1 times below  Proton Beam: Right Lung or bronchus    Treatment Period Technique Fraction Dose Fractions Total Dose   Course 2 2025-2025  (days elapsed: 14)         RUL 2025-2025 Protons 364 / 364 cGy 9 / 15 3,276/ 5,455 cGy     SUBJECTIVE: Still admitted. Due for discharge later today/tomorrow. Feels tired. Breathing stable from pre-admission.    Colonoscopy Findings 2025  The terminal ileum, cecum, ascending colon, transverse colon and descending colon appeared normal.  Multiple medium, scattered diverticula of moderate severity with no inflammation containing no content in the sigmoid colon and rectosigmoid; no bleeding was observed  Internal small hemorrhoids observed during retroflexion  No blood was observed.    OBJECTIVE:   Vital Signs:  There were no vitals taken for this visit.    Other Pertinent Findings: Sitting comfortably on hospital stretcher. Able to ambulate to bathroom. Alert, oriented. Breathing well on supplemental O2.     Toxicity Assessment          2025    14:48 2025    14:56   Toxicity Assessment   Treatment Site Thoracic Thoracic   Anorexia Grade 0 Grade 0   Anxiety Grade 0 Grade 0   Dehydration Grade 0 Grade 0   Depression Grade 0 Grade 0   Dermatitis Radiation Grade 0 Grade 0   Diarrhea Grade 0 Grade 0   Fatigue Grade 1 Grade 1   Nausea Grade 0 Grade 0   Pain Grade 0 Grade 0   Tumor Pain Grade 0   "  Vomiting Grade 0 Grade 0   Constipation Grade 0    Dyspepsia Grade 0    Dysphagia Grade 0    Aspiration Grade 0    Hoarseness Grade 0    Esophageal Pain  Grade 0   Bronchial Obstruction Grade 0 Grade 0   Cough Grade 0 Grade 1   Dyspnea Grade 3       gets winded just \"tying shoes\" Grade 2       pt having alot of wheezing today despite using inhaler   Epistaxis Grade 0 Grade 0   Hiccups Grade 0 Grade 0   Hypoxia Grade 2       94%at rest Grade 0          Assessment / Plan:  Dosimetry/IGRT reviewed.  The patient is tolerating radiation therapy as anticipated.  Continue per current treatment plan.           Deep Lee MD, MMM  Senior Attending Physician, [facility]  Professor, [employer]   Our [place]: “To Heal, To Teach, To Discover.”  RN partner: [phone]/ [email or web address]    Phone (scheduling): [email or web address]  Proton Therapy (scheduling): [phone]/ [email or web address]  Phone (after hours): [phone]       "

## 2025-04-29 ENCOUNTER — APPOINTMENT (OUTPATIENT)
Dept: RADIATION ONCOLOGY | Facility: HOSPITAL | Age: 80
End: 2025-04-29
Payer: MEDICARE

## 2025-04-29 ENCOUNTER — HOSPITAL ENCOUNTER (OUTPATIENT)
Dept: RADIATION ONCOLOGY | Facility: HOSPITAL | Age: 80
Setting detail: RADIATION/ONCOLOGY SERIES
Discharge: HOME | End: 2025-04-29
Payer: MEDICARE

## 2025-04-29 DIAGNOSIS — Z51.0 ENCOUNTER FOR ANTINEOPLASTIC RADIATION THERAPY: ICD-10-CM

## 2025-04-29 DIAGNOSIS — C34.11 MALIGNANT NEOPLASM OF UPPER LOBE, RIGHT BRONCHUS OR LUNG: ICD-10-CM

## 2025-04-29 LAB
RAD ONC MSQ ACTUAL FRACTIONS DELIVERED: 13
RAD ONC MSQ ACTUAL SESSION BIOLOGICAL DOSE: 400 CCGE
RAD ONC MSQ ACTUAL SESSION DELIVERED DOSE: 364 CGRAY
RAD ONC MSQ ACTUAL TOTAL BIOLOGICAL DOSE: 5205 CCGE
RAD ONC MSQ ACTUAL TOTAL DOSE: 4732 CGRAY
RAD ONC MSQ ELAPSED DAYS: 20
RAD ONC MSQ LAST DATE: NORMAL
RAD ONC MSQ PRESCRIBED BIOLOGICAL FRACTIONAL DOSE: 400 CCGE
RAD ONC MSQ PRESCRIBED BIOLOGICAL TOTAL DOSE: 6000 CCGE
RAD ONC MSQ PRESCRIBED FRACTIONAL DOSE: 364 CGRAY
RAD ONC MSQ PRESCRIBED NUMBER OF FRACTIONS: 15
RAD ONC MSQ PRESCRIBED TECHNIQUE: NORMAL
RAD ONC MSQ PRESCRIBED TOTAL DOSE: 5455 CGRAY
RAD ONC MSQ PRESCRIPTION PATTERN COMMENT: NORMAL
RAD ONC MSQ START DATE: NORMAL
RAD ONC MSQ TREATMENT COURSE NUMBER: 2
RAD ONC MSQ TREATMENT SITE: NORMAL

## 2025-04-29 PROCEDURE — 77387 GUIDANCE FOR RADJ TX DLVR: CPT

## 2025-04-29 PROCEDURE — 77523 PROTON TRMT INTERMEDIATE: CPT

## 2025-04-30 ENCOUNTER — LAB REQUISITION (OUTPATIENT)
Dept: LAB | Facility: HOSPITAL | Age: 80
End: 2025-04-30
Payer: MEDICARE

## 2025-04-30 ENCOUNTER — HOSPITAL ENCOUNTER (OUTPATIENT)
Dept: RADIATION ONCOLOGY | Facility: HOSPITAL | Age: 80
Setting detail: RADIATION/ONCOLOGY SERIES
Discharge: HOME | End: 2025-04-30
Payer: MEDICARE

## 2025-04-30 DIAGNOSIS — C34.11 MALIGNANT NEOPLASM OF UPPER LOBE, RIGHT BRONCHUS OR LUNG: ICD-10-CM

## 2025-04-30 DIAGNOSIS — C34.91 MALIGNANT NEOPLASM OF UNSPECIFIED PART OF RIGHT BRONCHUS OR LUNG (MULTI): ICD-10-CM

## 2025-04-30 DIAGNOSIS — Z51.0 ENCOUNTER FOR ANTINEOPLASTIC RADIATION THERAPY: ICD-10-CM

## 2025-04-30 LAB
ATRIAL RATE: 77 BPM
CREAT SERPL-MCNC: 1.1 MG/DL (ref 0.5–1.3)
EGFRCR SERPLBLD CKD-EPI 2021: 51 ML/MIN/1.73M*2
P AXIS: 68 DEGREES
P OFFSET: 176 MS
P ONSET: 118 MS
PR INTERVAL: 202 MS
Q ONSET: 219 MS
QRS COUNT: 13 BEATS
QRS DURATION: 90 MS
QT INTERVAL: 382 MS
QTC CALCULATION(BAZETT): 432 MS
QTC FREDERICIA: 415 MS
R AXIS: 16 DEGREES
RAD ONC MSQ ACTUAL FRACTIONS DELIVERED: 14
RAD ONC MSQ ACTUAL SESSION BIOLOGICAL DOSE: 400 CCGE
RAD ONC MSQ ACTUAL SESSION DELIVERED DOSE: 364 CGRAY
RAD ONC MSQ ACTUAL TOTAL BIOLOGICAL DOSE: 5606 CCGE
RAD ONC MSQ ACTUAL TOTAL DOSE: 5096 CGRAY
RAD ONC MSQ ELAPSED DAYS: 21
RAD ONC MSQ LAST DATE: NORMAL
RAD ONC MSQ PRESCRIBED BIOLOGICAL FRACTIONAL DOSE: 400 CCGE
RAD ONC MSQ PRESCRIBED BIOLOGICAL TOTAL DOSE: 6000 CCGE
RAD ONC MSQ PRESCRIBED FRACTIONAL DOSE: 364 CGRAY
RAD ONC MSQ PRESCRIBED NUMBER OF FRACTIONS: 15
RAD ONC MSQ PRESCRIBED TECHNIQUE: NORMAL
RAD ONC MSQ PRESCRIBED TOTAL DOSE: 5455 CGRAY
RAD ONC MSQ PRESCRIPTION PATTERN COMMENT: NORMAL
RAD ONC MSQ START DATE: NORMAL
RAD ONC MSQ TREATMENT COURSE NUMBER: 2
RAD ONC MSQ TREATMENT SITE: NORMAL
T AXIS: 269 DEGREES
T OFFSET: 410 MS
VENTRICULAR RATE: 77 BPM

## 2025-04-30 PROCEDURE — 82565 ASSAY OF CREATININE: CPT

## 2025-04-30 PROCEDURE — 77523 PROTON TRMT INTERMEDIATE: CPT

## 2025-04-30 PROCEDURE — 77387 GUIDANCE FOR RADJ TX DLVR: CPT

## 2025-04-30 NOTE — PROGRESS NOTES
Radiation Oncology On Treatment Visit    Patient Name:  Nadine Smith  MRN:  [id number]  :  1945    Referring Provider: Thai Jordan MD   Primary Care Provider: Loree Munoz MD  Care Team:   Patient Care Team:  Loree Munoz MD as PCP - General  Fernando Encarnacion MD as Consulting Physician (Hematology and Oncology)  Thai Jordan MD as Consulting Physician (Hematology and Oncology)    Date of Service: 2025     Diagnosis:   Specialty Problems    None    Treatment Summary:  Proton Beam: Right Lung or bronchus    Treatment Period Technique Fraction Dose Fractions Total Dose   Course 2 2025-2025  (days elapsed: )         RUL 2025-2025 Protons 364 / 364 cGy 12 / 15 4,368 / 5,455 cGy     SUBJECTIVE: Discharged home last week. No acute events since discharge. Stable shortness of breath, pedal edema. Feels tired.   No worsening chest pain.   Using inhalers intermittently.    OBJECTIVE:   Vital Signs:  /83   Pulse 83   Resp 16   Wt 83.7 kg (184 lb 9.6 oz)   SpO2 96%   BMI 26.49 kg/m²     Other Pertinent Findings: Sitting comfortably on wheelchair. Alert, oriented. Breathing well on room air. Not using supplemental O2.      Toxicity Assessment          2025    14:48 2025    14:56 2025    14:24   Toxicity Assessment   Treatment Site Thoracic Thoracic Thoracic   Anorexia Grade 0 Grade 0 Grade 1   Anxiety Grade 0 Grade 0 Grade 0   Dehydration Grade 0 Grade 0 Grade 0   Depression Grade 0 Grade 0 Grade 0   Dermatitis Radiation Grade 0 Grade 0 Grade 0   Diarrhea Grade 0 Grade 0 Grade 0   Fatigue Grade 1 Grade 1 Grade 1   Fracture   Grade 0   Nausea Grade 0 Grade 0 Grade 0   Pain Grade 0 Grade 0 Grade 1       left sided pain comes and goes   Tumor Pain Grade 0     Vomiting Grade 0 Grade 0 Grade 0   Constipation Grade 0     Dyspepsia Grade 0     Dysphagia Grade 0     Aspiration Grade 0     Hoarseness Grade 0     Esophageal Pain  Grade 0 Grade 0  "  Bronchial Obstruction Grade 0 Grade 0 Grade 0   Cough Grade 0 Grade 1 Grade 1   Dyspnea Grade 3       gets winded just \"tying shoes\" Grade 2       pt having alot of wheezing today despite using inhaler Grade 1       with movement   Epistaxis Grade 0 Grade 0 Grade 0   Hiccups Grade 0 Grade 0 Grade 0   Hypoxia Grade 2       94%at rest Grade 0 Grade 0          Assessment / Plan:  Dosimetry/IGRT reviewed.  The patient is tolerating radiation therapy as anticipated.  Continue per current treatment plan.     RTC 1 month with CT Ch w con.  Repeat CBC later this week to recheck Hb.  Pt will call Pulmonology and PCP to reschedule visits.  Needs GI follow up. Will send message to Dr. Murray to confirm.  Will coordinate follow up with Dr. Jordan.    Deep Lee MD, MMM  Senior Attending Physician, [facility]  Professor, [employer] [facility] [employer] [facility] [employer]   Our [place]: “To Heal, To Teach, To Discover.”  RN partner: [phone]/ [email or web address]    Phone (scheduling): [phone]/[email or web address]  Proton Therapy (scheduling): [phone]/ [email or web address]  Phone (after hours): [phone]       "

## 2025-05-01 ENCOUNTER — DOCUMENTATION (OUTPATIENT)
Dept: RADIATION ONCOLOGY | Facility: HOSPITAL | Age: 80
End: 2025-05-01

## 2025-05-01 ENCOUNTER — HOSPITAL ENCOUNTER (OUTPATIENT)
Dept: RADIATION ONCOLOGY | Facility: HOSPITAL | Age: 80
Setting detail: RADIATION/ONCOLOGY SERIES
Discharge: HOME | End: 2025-05-01
Payer: MEDICARE

## 2025-05-01 DIAGNOSIS — Z51.0 ENCOUNTER FOR ANTINEOPLASTIC RADIATION THERAPY: ICD-10-CM

## 2025-05-01 DIAGNOSIS — C34.11 MALIGNANT NEOPLASM OF UPPER LOBE, RIGHT BRONCHUS OR LUNG: ICD-10-CM

## 2025-05-01 LAB
RAD ONC MSQ ACTUAL FRACTIONS DELIVERED: 15
RAD ONC MSQ ACTUAL SESSION BIOLOGICAL DOSE: 400 CCGE
RAD ONC MSQ ACTUAL SESSION DELIVERED DOSE: 364 CGRAY
RAD ONC MSQ ACTUAL TOTAL BIOLOGICAL DOSE: 6006 CCGE
RAD ONC MSQ ACTUAL TOTAL DOSE: 5460 CGRAY
RAD ONC MSQ ELAPSED DAYS: 22
RAD ONC MSQ LAST DATE: NORMAL
RAD ONC MSQ PRESCRIBED BIOLOGICAL FRACTIONAL DOSE: 400 CCGE
RAD ONC MSQ PRESCRIBED BIOLOGICAL TOTAL DOSE: 6000 CCGE
RAD ONC MSQ PRESCRIBED FRACTIONAL DOSE: 364 CGRAY
RAD ONC MSQ PRESCRIBED NUMBER OF FRACTIONS: 15
RAD ONC MSQ PRESCRIBED TECHNIQUE: NORMAL
RAD ONC MSQ PRESCRIBED TOTAL DOSE: 5455 CGRAY
RAD ONC MSQ PRESCRIPTION PATTERN COMMENT: NORMAL
RAD ONC MSQ START DATE: NORMAL
RAD ONC MSQ TREATMENT COURSE NUMBER: 2
RAD ONC MSQ TREATMENT SITE: NORMAL

## 2025-05-01 PROCEDURE — 77523 PROTON TRMT INTERMEDIATE: CPT

## 2025-05-01 PROCEDURE — 77387 GUIDANCE FOR RADJ TX DLVR: CPT

## 2025-05-07 DIAGNOSIS — C34.11 MALIGNANT NEOPLASM OF UPPER LOBE, RIGHT BRONCHUS OR LUNG: Primary | ICD-10-CM

## 2025-05-10 NOTE — RADIATION COMPLETION NOTES
Radiation Oncology Treatment Summary    Patient Name:  Nadine Smith  MRN:  [id number]  :  1945    Referring Provider: No ref. provider found  Primary Care Provider: Loree Munoz MD    Brief History: Nadine Smith is a 80 y.o. adult with with HTN, BPH, who presented to  ED in May 2023 complaining of intermittent coughing & started to cough up sputum mixed with blood for 2 days and was found to have RUL  mass leading to a diagnosis of early stage NSCLC, adenocarcinoma, [id number], stage IIA, EBUS negative for station 7 lymph nodes. Due to poor PFTs, he was not felt to be a surgical candidate and treated with SBRT 60 Gray in 5 fractions from  to 2023.  He was being followed with surveillance CT scans which more recently revealed concern for recurrent parenchymal disease adjoining areas of radiation changes these were evaluated with PET/CT and bronchoscopy EBUS confirming recurrent disease (adenocarcinoma) within the lung parenchyma, negative mediastinal nodes 4L, 4R, 7, 11L, 11Rs, PD-L1 TPS less than 1%. He has had decline in his PFTs with resulting significant shortness of breath. Review of imaging has identified possibility of at least two or more foci of disease in close proximity of prior radiation-related changes. His prior radiation was 17 months ago, which allows possibility of a 2nd course of radiation. Based on the review of the treatment images, given concern for possibility of two or more foci, this will not be amenable to SBRT. As such he was recommended a hypofractionated course.    The patient completed radiotherapy as outlined below.    Radiation Treatment Summary:    Proton Beam: Right Lung or bronchus    Treatment Period Technique Fraction Dose Fractions Total Dose   Course 2 2025-2025  (days elapsed: )         RUL 2025-2025 Protons 364 / 364 cGy 15 / 15 5,460 / 5,455 cGy     Please see the patient's Mosaiq chart for further details  "regarding the radiation plan, including beam energy.    Concurrent Chemotherapy:  Treatment Plans       No treatment plans exist          CTCAE Toxicity Overview:   Toxicity Assessment          4/14/2025    14:48 4/23/2025    14:56 4/28/2025    14:24   Toxicity Assessment   Treatment Site Thoracic Thoracic Thoracic   Anorexia Grade 0 Grade 0 Grade 1   Anxiety Grade 0 Grade 0 Grade 0   Dehydration Grade 0 Grade 0 Grade 0   Depression Grade 0 Grade 0 Grade 0   Dermatitis Radiation Grade 0 Grade 0 Grade 0   Diarrhea Grade 0 Grade 0 Grade 0   Fatigue Grade 1 Grade 1 Grade 1   Fracture   Grade 0   Nausea Grade 0 Grade 0 Grade 0   Pain Grade 0 Grade 0 Grade 1       left sided pain comes and goes   Tumor Pain Grade 0     Vomiting Grade 0 Grade 0 Grade 0   Constipation Grade 0     Dyspepsia Grade 0     Dysphagia Grade 0     Aspiration Grade 0     Hoarseness Grade 0     Esophageal Pain  Grade 0 Grade 0   Bronchial Obstruction Grade 0 Grade 0 Grade 0   Cough Grade 0 Grade 1 Grade 1   Dyspnea Grade 3       gets winded just \"tying shoes\" Grade 2       pt having alot of wheezing today despite using inhaler Grade 1       with movement   Epistaxis Grade 0 Grade 0 Grade 0   Hiccups Grade 0 Grade 0 Grade 0   Hypoxia Grade 2       94%at rest Grade 0 Grade 0     Patient Disposition: The patient was admitted during the course of treatment due to sudden onset GI bleed with severe acute anemia.  Colonoscopy showed multiple medium, scattered diverticula of moderate severity with no inflammation containing no content in the sigmoid colon and rectosigmoid; no bleeding was observed.    The patient otherwise tolerated radiation therapy as anticipated. He had continued baseline shortness of breath, on supplemental oxygen, pedal edema.    RTC 1 month with CT Ch w con.    Pt will call Pulmonology and PCP to reschedule visits.       Deep Lee MD, MMM  Senior Attending Physician, [facility]  Professor, [facility] " [facility]   Our [place]: “To Heal, To Teach, To Discover.”  RN partner: [phone]/ [email or web address]    Phone (scheduling): [phone]/[email or web address]  Proton Therapy (scheduling): [phone]/ [email or web address]  Phone (after hours): [phone]

## 2025-05-30 ENCOUNTER — TELEPHONE (OUTPATIENT)
Dept: RADIATION ONCOLOGY | Facility: HOSPITAL | Age: 80
End: 2025-05-30
Payer: MEDICARE

## 2025-05-30 NOTE — TELEPHONE ENCOUNTER
Called pt to remind of appointment on 6/2/2025 at 3:00. Pt's mailbox wasn't setup for me to leave a message about pt appt for 6/2/2025.

## 2025-06-02 ENCOUNTER — HOSPITAL ENCOUNTER (OUTPATIENT)
Dept: RADIOLOGY | Facility: HOSPITAL | Age: 80
Discharge: HOME | End: 2025-06-02
Payer: MEDICARE

## 2025-06-02 ENCOUNTER — HOSPITAL ENCOUNTER (OUTPATIENT)
Dept: RADIATION ONCOLOGY | Facility: HOSPITAL | Age: 80
Setting detail: RADIATION/ONCOLOGY SERIES
Discharge: HOME | End: 2025-06-02
Payer: MEDICARE

## 2025-06-02 VITALS
DIASTOLIC BLOOD PRESSURE: 71 MMHG | HEART RATE: 88 BPM | BODY MASS INDEX: 26.34 KG/M2 | OXYGEN SATURATION: 94 % | TEMPERATURE: 98.1 F | WEIGHT: 183.6 LBS | RESPIRATION RATE: 18 BRPM | SYSTOLIC BLOOD PRESSURE: 136 MMHG

## 2025-06-02 DIAGNOSIS — C34.91 NON-SMALL CELL CANCER OF RIGHT LUNG (MULTI): ICD-10-CM

## 2025-06-02 DIAGNOSIS — C34.91 NON-SMALL CELL CANCER OF RIGHT LUNG (MULTI): Primary | ICD-10-CM

## 2025-06-02 PROCEDURE — G2211 COMPLEX E/M VISIT ADD ON: HCPCS | Performed by: NURSE PRACTITIONER

## 2025-06-02 PROCEDURE — 99214 OFFICE O/P EST MOD 30 MIN: CPT | Performed by: NURSE PRACTITIONER

## 2025-06-02 PROCEDURE — 2550000001 HC RX 255 CONTRASTS: Performed by: RADIOLOGY

## 2025-06-02 PROCEDURE — 71260 CT THORAX DX C+: CPT

## 2025-06-02 RX ADMIN — IOHEXOL 75 ML: 350 INJECTION, SOLUTION INTRAVENOUS at 13:35

## 2025-06-02 ASSESSMENT — PAIN SCALES - GENERAL: PAINLEVEL_OUTOF10: 0-NO PAIN

## 2025-06-02 ASSESSMENT — ENCOUNTER SYMPTOMS
ACTIVITY CHANGE: 0
GASTROINTESTINAL NEGATIVE: 1
UNEXPECTED WEIGHT CHANGE: 0
COUGH: 0
HEMATOLOGIC/LYMPHATIC NEGATIVE: 1
OCCASIONAL FEELINGS OF UNSTEADINESS: 0
NEUROLOGICAL NEGATIVE: 1
FATIGUE: 0
CHOKING: 0
DEPRESSION: 0
WHEEZING: 0
CHILLS: 0
APPETITE CHANGE: 0
SHORTNESS OF BREATH: 1
PSYCHIATRIC NEGATIVE: 1
FEVER: 0
CHEST TIGHTNESS: 0
DIAPHORESIS: 0
APNEA: 0
CARDIOVASCULAR NEGATIVE: 1
LOSS OF SENSATION IN FEET: 0
MUSCULOSKELETAL NEGATIVE: 1

## 2025-06-02 NOTE — PROGRESS NOTES
Patient ID: [id number]   Nadine Smith is a 80 y.o. adult with PMH of HTN, BPH and  known diagnosis of early stage NSCLC, adenocarcinoma, [id number], stage IIA, EBUS negative for station 7 lymph nodes s/p SBRT 09/2023. Patient found to have recurrent disease.     His oncological work up and treatment history is as below:  Bronchoscopy with EBUS on 06/15/2023 by Dr. uStton which has confirmed invasive poorly differentiated adenocarcinoma with negative station 7 N. PDL1 M 1%, negative for targeting mutations.  He was seen by Dr. Reynolds in thoracic surgery and underwent additional work up with demonstrated poor PFTs and hence not a surgical candidate.  PFT 07/13/2023: FEV1 < 43% predicted, FVC < 56% prdicted, DLCO <26% predicted.  Stereotactic body radiation therapy (SBRT) to the RUL given from August 30, 2023 through September 11, 2023, 60 Gy in 5 fractions.   Undergoing surveillance scans. CT Scan 1/23/2025 demonstrated concern for parenchymal recurrence overlapping with post radiation changes. PET-CT 1/30/2025 demonstrated avidity in area of concern (details below).  Bronchoscopy with EBUS on 02/18/2025, Fatimah Muhammad MD   Rapid On-Site Evaluation (DORCAS):  Preliminary cytology from RUL lesions was non-diagnostic. Preliminary cytology from the lymph node station(s) 4L, 4R, 7, 11L, 11Rs showed lymphoid tissue. Lymph node 11Rs was suggestive of non-diagnostic material.      Pathology Review:  The pertinent pathology results were reviewed from EMR and discussed with the patient.        Cytology-Non GYN [Jun 16 2023 2:43PM]     A. FINE NEEDLE ASPIRATION LUNG - RIGHT UPPER LOBE NODULE, CYTOLOGY AND CELL BLOCK: MALIGNANT CELLS PRESENT DERIVED FROM ADENOCARCINOMA OF LUNG, SEE NOTE. NOTE: The malignant cells are positive for TTF-1 and p40.   B. FINE NEEDLE ASPIRATION 7 LYMPH  NODE, CYTOLOGY AND CELL BLOCK:   NO MALIGNANT CELLS IDENTIFIED. LYMPHOID SAMPLE.   MICROSATELLITE STATUS: Microsatellite  Instability-High (MSI-H) is NOT DETECTED.   DISEASE ASSOCIATED GENOMIC FINDINGS:   BRAF p.G469V ([id number] c.[id number]>T)    TP53 p.H179Y ([id number] c.535C>T)   DISEASE RELEVANT ALTERATIONS NOT DETECTED:   Negative for ALK fusion.   Negative for BRAF V600E.   Negative for EGFR sensitizing mutation.   Negative for ERBB2 activating mutation   Negative  for KRAS G12C.   Negative for MET exon 14 skipping mutation.   Negative for NTRK fusion.   Negative for RET fusion.   Negative for ROS1 fusion.   ===========================  Next Report ===========================     Surgical Pathology [Ra 15 2023 3:56PM]    FINAL DIAGNOSIS   LUNG, RIGHT UPPER LOBE NODULE, BIOPSY:   --INVASIVE POORLY DIFFERENTIATED ADENOCARCINOMA ADENOCARCINOMA.   Note: By immunostaining, the tumor cells are positive for TTF-1 and negative for p40, supportive of the above diagnosis.  PD-L1 has been ordered, and the results will be issued in an adsendum. Molecular testing has been ordered on the above-noted cytology specimen, and the results will be issued in an   addendum to that report.   Addendum Diagnosis   PD-L1 22C3  by Immunohistochemistry with Interpretation, pembrolizumab (KEYTRUDA)   Interpretation: NO EXPRESSION   Tumor Proportion Score (TPS): <1%         FINAL DIAGNOSIS 2/18/2025   A. LUNG, RIGHT UPPER LOBE NODULE#1; BIOPSY:   -- Rare highly atypical glands, consistent with adenocarcinoma, acinar pattern.   PDL1 Tumor Proportion Score (TPS): <1%      B. LUNG, RIGHT UPPER LOBE NODULE#2; BIOPSY:   -- Non-diagnostic fragments of lung parenchyma.      Final Cytological Interpretation 2/18/2025   A. LUNG FINE NEEDLE ASPIRATION RIGHT UPPER LOBE- RUL NODULE #1 : PET POSITIVE, CYTOLOGY AND CELL BLOCK:   -- Non diagnostic specimen due to insufficient cellularity.  -- Please refer to concurrent surgical specimen, [id number].                            B. BRONCHIAL BRUSH RIGHT UPPER LOBE - RUL NODULE #1 : PET POSITIVE , CYTOLOGY AND CELL BLOCK:   -- Non  diagnostic specimen due to insufficient cellularity.  -- Please refer to concurrent surgical specimen, [id number].              C. LUNG FINE NEEDLE ASPIRATION RIGHT UPPER LOBE - RUL NODULE #2 , CYTOLOGY AND CELL BLOCK:   -- Non diagnostic specimen due to insufficient cellularity.  -- Please refer to concurrent surgical specimen, [id number].                            D. LYMPH NODE 4 L PULMONARY FINE NEEDLE ASPIRATION, CYTOLOGY AND CELL BLOCK:    -- No malignant cells identified.  -- Lymphoid sample.            E. LYMPH NODE 7 PULMONARY FINE NEEDLE ASPIRATION, CYTOLOGY AND CELL BLOCK:    -- No malignant cells identified.  -- Lymphoid sample.     F. LYMPH NODE 4 R PULMONARY FINE NEEDLE ASPIRATION, CYTOLOGY AND CELL BLOCK:   -- No malignant cells identified.  -- Lymphoid sample.     G. LYMPH NODE 11 Rs PULMONARY FINE NEEDLE ASPIRATION, CYTOLOGY AND CELL BLOCK:   -- Non diagnostic specimen due to insufficient cellularity.     H. LYMPH NODE 11 Ri PULMONARY FINE NEEDLE ASPIRATION, CYTOLOGY AND CELL BLOCK:   -- No malignant cells identified.  -- Lymphoid sample         Imaging:  The pertinent imaging results were reviewed from EMR/PACS with key results discussed with the patient.     MRI Brain w/wo Contrast [Jul 17 2023 5:49AM]  IMPRESSION:   There are  no abnormal intracranial enhancing mass lesion to suggest brain metastasis. There is moderate brain parenchymal volume loss. There are nonspecific white matter changes within the cerebral hemispheres bilaterally as well as scattered foci of increased  signal on the FLAIR and T2 weighted images overlying the brainstem which while nonspecific, given the patient's age, likely represent sequelae of more remote small-vessel ischemic change.    Echocardiogram [Jul 13 2023 11:16AM]  CONCLUSIONS:   1. Left ventricular systolic function is normal with a 60-65% estimated ejection fraction.    PET/CT Lung Ca Initial [Jul 12 2023 2:46PM]  Hypermetabolic focus in the region of the right  parotid gland with max SUV of 5.5. Hypermetabolic anterior right upper lobe mass with max SUV 16.1. Mildly hypermetabolic mediastinal and bilateral  hilar lymph nodes. A right hilar node demonstrates max SUV of 3.7. Right paratracheal node demonstrates max SUV of 3.4. A left hilar node demonstrates max SUV of 3.5. No hypermetabolic soft tissue lesion is present in the abdomen and pelvis. No evidence  of hypermetabolic lymphadenopathy. There is no focal hypermetabolic lesion to suggest osseous metastasis.   IMPRESSION:   1. Hypermetabolic anterior right upper lobe mass consistent with biopsy-proven malignancy.   2. Nonspecific mildly hypermetabolic  mediastinal and bilateral hilar lymph nodes which may be reactive innature, however metastatic disease can not be definitely excluded.   3. Hypermetabolic focus in the region of the right parotid gland which is favored to represent a benign process  such as a Warthin's tumor with a metastatic lymph node felt to be less likely.       CT chest wo IV contrast 01/23/2025   1.  Interval slightly increased size of a masslike consolidation with  spiculated margins in the right upper lobe now measuring 3.6 x 1.6 cm  as well as additional increased area of interlobular septal  thickening and linear parenchymal opacities. There has been also  interval slightly increased size of an additional ill-defined nodular  opacity in the right upper lobe posteriorly measuring 1.4 x 1.2 cm.  There is surrounding postradiation changes. Considering the gradual  increase in size of the nodular consolidations, there is concern for  possible local disease recurrence. May consider PET-CT for better  characterization followed by tissue diagnosis if clinically indicated.          PET CT LUNG CA STAGING; 1/30/2025   1. *A FDG avid lesion along the anterior aspect of right upper lobe with  a SUV max of 7.6 versus 16 previously, likely represent residual disease versus local recurrence.   Mildly FDG avid  opacities in the right upper lobe adjacent to the aforementioned mass, with SUV  max 2.1, likely representing post radiation changes.    2. 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  3. No PET evidence of distant metastasis.          PFT 2/10/2025:  Spirometry shows no obstruction. There is no significant bronchodilator response. Lung volumes indicate a mild restrictive defect. Spirometry (normal FEV1/FVC) and lung volumes (increased RV/TLC) suggest a complex restrictive ventilatory impairment.   FEV1: 134 L/ 48% predicted.  FVC: 2.13 L/ 56% predicted.  Patient unable to follow instructions for DLCO testing.    The patient completed radiotherapy as outlined below.     Radiation Treatment Summary:            Proton Beam: Right Lung or bronchus     Treatment Period Technique Fraction Dose Fractions Total Dose   Course 2 4/9/2025-5/1/2025  (days elapsed: 22)         RUL 4/9/2025-5/1/2025 Protons 364 / 364 cGy 15 / 15 5,460 / 5,455 cGy      Please see the patient's Mosaiq chart for further details regarding the radiation plan, including beam energy.     Concurrent Chemotherapy:  Treatment Plans         No treatment plans exist      History of presenting illness    Nadine Smith is a 80 y.o. adult who presents today for follow up 1 year and 9 month s/p SBRT to the RUL.  Patient was found to have recurrence and is now one month s/p RT utilizing protons to the RUL. Patient is doing well. Energy is baseline. Appetite is good. Weight stable. Breathing remains unchanged. Endorses SOB with exertion. No cough. Using inhalers/nebulizer as prescribed. No oxygen requirements.  Follow up with pulmonology later this month. Denies chest pain, back pain or fevers. No neurological symptoms. Following with Dr. Ester HASSAN. CT done today prior to this visit.     Review of systems:  Review of Systems   Constitutional:  Negative for activity change, appetite change, chills,  diaphoresis, fatigue, fever and unexpected weight change.   HENT: Negative.     Respiratory:  Positive for shortness of breath (with exertion). Negative for apnea, cough, choking, chest tightness and wheezing.    Cardiovascular: Negative.    Gastrointestinal: Negative.    Genitourinary: Negative.    Musculoskeletal: Negative.    Neurological: Negative.    Hematological: Negative.    Psychiatric/Behavioral: Negative.         Past Medical history  She  has a past surgical history that includes CT angio head w and wo IV contrast (05/18/2017); CT angio neck (05/18/2017); Colonoscopy; and Bronchoscopy.      Last recorded vital:  /71   Pulse 88   Temp 36.7 °C (98.1 °F) (Temporal)   Resp 18   Wt 83.3 kg (183 lb 9.6 oz)   SpO2 94%   BMI 26.34 kg/m²     Physical exam  Physical Exam  Constitutional:       General: She is not in acute distress.     Appearance: Normal appearance. She is normal weight. She is not ill-appearing, toxic-appearing or diaphoretic.   HENT:      Head: Normocephalic.      Mouth/Throat:      Mouth: Mucous membranes are moist.      Pharynx: Oropharynx is clear.   Eyes:      Conjunctiva/sclera: Conjunctivae normal.      Pupils: Pupils are equal, round, and reactive to light.   Cardiovascular:      Rate and Rhythm: Normal rate and regular rhythm.      Pulses: Normal pulses.      Heart sounds: Normal heart sounds.   Pulmonary:      Effort: Pulmonary effort is normal. No respiratory distress.      Breath sounds: No wheezing, rhonchi or rales.   Abdominal:      General: Bowel sounds are normal. There is no distension.      Palpations: Abdomen is soft. There is no mass.      Tenderness: There is no abdominal tenderness. There is no guarding or rebound.      Hernia: No hernia is present.   Musculoskeletal:         General: Normal range of motion.      Cervical back: Normal range of motion and neck supple.   Skin:     General: Skin is warm and dry.   Neurological:      General: No focal deficit  present.      Mental Status: She is alert and oriented to person, place, and time.      Cranial Nerves: No cranial nerve deficit.      Sensory: No sensory deficit.      Motor: No weakness.      Coordination: Coordination normal.      Gait: Gait normal.   Psychiatric:         Mood and Affect: Mood normal.         Behavior: Behavior normal.         Thought Content: Thought content normal.         Judgment: Judgment normal.         Radiology results:    CT chest w IV contrast  Result Date: 6/2/2025  Interpreted By:  Nieves Oropeza and Sheng Max STUDY: CT CHEST W IV CONTRAST;  6/2/2025 1:34 pm   INDICATION: Signs/Symptoms:Post-radiation follow up assessment.   ,C34.91 Malignant neoplasm of unspecified part of right bronchus or lung (Multi)   Per EMR: 80-year-old male history of lung adenocarcinoma of the right upper lobe status post radiation therapy in 2023 with local recurrence in the right upper lobe on 02/2025. Patient moves recently received radiation therapy to the right upper lobe on 05/01/2025. Former smoker.   COMPARISON: CT CHEST WITHOUT FOR PRUDENCIO [facility] PLANNING 2/18/2025, CT CHEST WO IV CONTRAST 1/23/2025, CT CHEST W IV CONTRAST 10/24/2024, CT CHEST W IV CONTRAST 12/14/2023, PET-CT 01/30/2025   ACCESSION NUMBER(S): [id number]   ORDERING CLINICIAN: RK SUMMERS   TECHNIQUE: Helical data acquisition of the chest was obtained following intravenous administration of 75 mL Omnipaque 350. Images were reformatted in axial, coronal, and sagittal planes.   FINDINGS: LUNGS AND AIRWAYS: The trachea and central airways are patent. No endobronchial lesion is seen. There is mild diffuse bronchial wall thickening, which is a non-specific finding, but may be due to chronic bronchitis, given the history of smoking. Background of moderate-to-severe centrilobular and paraseptal emphysema.   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    Similar spiculated noncalcified nodules with surrounding ground-glass opacities throughout the anterior segments of right upper lobe measuring 3.6 x 1.6 cm (Series 3, Image 111) and 1.4 x 1.2 cm (Series 3, Image 111). These lesions demonstrated increased FDG activity on prior PET-CT from 01/30/2025 consistent with known neoplasm. There is surrounding pleuroparenchymal scarring, bronchiectasis and lung volume loss consistent with postradiation changes.   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   Remaining few pulmonary nodules appear stable in size, for example: * 0.4 cm (Series 3, Image 147) abutting the right minor fissure * 0.3 cm (Series 3, Image 180) superior segment left lower lobe   New trace right pleural effusion. No focal consolidation or pneumothorax.   MEDIASTINUM AND LIZABETH, LOWER NECK AND AXILLA: There are few small hypoattenuating nodules measuring less than 1.5 cm in the left lobe of thyroid gland, likely benign. If there are no clinical risk factors for thyroid cancer, no further evaluation is recommended. (Managing Incidental Thyroid Nodules Detected on Imaging: White Paper of the ACR Incidental Thyroid Findings Committee. Julisa Rojas. et al. Journal of the American College of Radiology,Volume 12, Issue 2, 143 - 150.)   Stable prominent/rounded mediastinal lymph nodes, for example: * 0.8 cm (Series 2, Image 114) right lower paratracheal node * 1.0 cm (Series 2, Image 139) right hilar node   Esophagus appears within normal limits as seen.   HEART AND VESSELS: The thoracic aorta normal in course and caliber.There are mild scattered atherosclerosis present, including calcified and noncalcified plaques. Main pulmonary artery and its branches are normal in caliber. Mild coronary artery calcifications are seen.Please note,the study is not optimized for evaluation of coronary arteries. The cardiac chambers are  not enlarged. There is no pericardial effusion.   UPPER ABDOMEN: Redemonstration of numerous circumscribed hypoattenuating lesions throughout the left and right hepatic lobes that did not demonstrate increased FDG activity on prior PET-CT from 01/03/2025 most consistent with hepatic cysts. The largest lesion measures 1.6 cm in segment 2/3. Stable simple cyst in the superior pole left kidney measuring 5.3 x 4.5 cm (Series 2, Image 295). The remaining visualized subdiaphragmatic structures demonstrate no remarkable findings.   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   No acute osseous pathology. There are no suspicious osseous lesions. Moderate degenerative changes throughout the thoracic spine.       1. Similar spiculated nodules with surrounding ground-glass opacities throughout the anterior segments of the right upper lobe over a background of postradiation changes correlating with patient's known malignancy. Remaining pulmonary nodules and prominent mediastinal nodes are unchanged when compared to prior exam. 2. New trace right pleural effusion. 3. Stable moderate severe centrilobular and paraseptal emphysema with findings of chronic bronchitis. 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 4. 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 5. Additional incidental non-acute findings as detailed above.     I personally reviewed the images/study and I agree with the findings as stated by Dr. Blair Ash. This study was interpreted at [facility], [place], [place].   MACRO: none   Signed by: Nieves Esqueda 6/2/2025 3:33 PM Dictation workstation:   [id number]     Plan:  Assessment/Plan     80 year old male 1 year and 5 months s/p SBRT to the  RUL and one month s/p radiation utilizing protons to the RUL for recurrence. Patient is doing well with no acute complaints related to treatment. Continue to use inhalers and nebulizer as prescribed. Continue follow up with pulmonologist as scheduled.  CT scan done today prior to this appt. Scan shows similar spiculated nodules with surrounding ground-glass opacities throughout the anterior segments of the right upper lobe over a background of postradiation changes correlating with patient's known malignancy. Remaining pulmonary nodules and prominent mediastinal nodes are unchanged when compared to prior exam. New trace right pleural effusion. Stable moderate severe centrilobular and paraseptal emphysema with findings of chronic bronchitis. 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 Patient to return to clinic in 3 months with same day CT of the chest. Instructed to call with any questions or concerns.

## 2025-06-23 ENCOUNTER — OFFICE VISIT (OUTPATIENT)
Dept: PULMONOLOGY | Facility: HOSPITAL | Age: 80
End: 2025-06-23
Payer: MEDICARE

## 2025-06-23 VITALS
OXYGEN SATURATION: 95 % | WEIGHT: 180 LBS | TEMPERATURE: 97.8 F | DIASTOLIC BLOOD PRESSURE: 69 MMHG | SYSTOLIC BLOOD PRESSURE: 151 MMHG | BODY MASS INDEX: 25.83 KG/M2 | HEART RATE: 65 BPM

## 2025-06-23 DIAGNOSIS — J44.9 CHRONIC OBSTRUCTIVE PULMONARY DISEASE, UNSPECIFIED COPD TYPE (MULTI): Primary | ICD-10-CM

## 2025-06-23 DIAGNOSIS — C34.91 NON-SMALL CELL CANCER OF RIGHT LUNG (MULTI): ICD-10-CM

## 2025-06-23 DIAGNOSIS — J43.9 PULMONARY EMPHYSEMA, UNSPECIFIED EMPHYSEMA TYPE (MULTI): ICD-10-CM

## 2025-06-23 PROCEDURE — 1159F MED LIST DOCD IN RCRD: CPT | Performed by: NURSE PRACTITIONER

## 2025-06-23 PROCEDURE — 1036F TOBACCO NON-USER: CPT | Performed by: NURSE PRACTITIONER

## 2025-06-23 PROCEDURE — 99214 OFFICE O/P EST MOD 30 MIN: CPT | Performed by: NURSE PRACTITIONER

## 2025-06-23 PROCEDURE — 99213 OFFICE O/P EST LOW 20 MIN: CPT | Performed by: NURSE PRACTITIONER

## 2025-06-23 PROCEDURE — 1126F AMNT PAIN NOTED NONE PRSNT: CPT | Performed by: NURSE PRACTITIONER

## 2025-06-23 RX ORDER — BUDESONIDE, GLYCOPYRROLATE, AND FORMOTEROL FUMARATE 160; 9; 4.8 UG/1; UG/1; UG/1
2 AEROSOL, METERED RESPIRATORY (INHALATION) 2 TIMES DAILY
Qty: 72 G | Refills: 3 | Status: SHIPPED | OUTPATIENT
Start: 2025-06-23 | End: 2026-06-23

## 2025-06-23 RX ORDER — ALBUTEROL SULFATE 90 UG/1
2 INHALANT RESPIRATORY (INHALATION) EVERY 4 HOURS PRN
Qty: 54 G | Refills: 3 | Status: SHIPPED | OUTPATIENT
Start: 2025-06-23 | End: 2026-06-23

## 2025-06-23 ASSESSMENT — ENCOUNTER SYMPTOMS
EYE PAIN: 0
AGITATION: 0
NUMBNESS: 0
DIZZINESS: 0
FEVER: 0
VOICE CHANGE: 0
SINUS PRESSURE: 0
HEADACHES: 0
JOINT SWELLING: 0
SLEEP DISTURBANCE: 1
ARTHRALGIAS: 0
BACK PAIN: 0
DIARRHEA: 0
MYALGIAS: 0
ABDOMINAL PAIN: 0
NAUSEA: 0
NERVOUS/ANXIOUS: 0
FATIGUE: 0
RHINORRHEA: 0
PALPITATIONS: 0
VOMITING: 0
WEAKNESS: 0

## 2025-06-23 ASSESSMENT — PAIN SCALES - GENERAL: PAINLEVEL_OUTOF10: 0-NO PAIN

## 2025-06-23 NOTE — PROGRESS NOTES
Patient: Nadine Smith    [id number]  : 1945 -- AGE 80 y.o.    Provider: HARRISON Mari-CNP     Location [facility]   Service Date: 2025              [facility]  Follow up visit note      HISTORY OF PRESENT ILLNESS       HISTORY OF PRESENT ILLNESS     Mr. Smith is a 80 year old AAM (former smoker~50 pack years ) here for follow up related to lung cancer. Bronchoscopy with biopsy showed adenocarcinoma. Last visit 25.      PCP: Dr. Munoz   Hem/Onc: Dr. Jordan   Rad/Onc: Dr. Lee     Since last visit he feels his breathing got a little bit better with the breztri twice daily, albuterol HFA 6x a day, albuterol nebulizers 3x a day.  He has SPARKS with walking long distances - he does not try to do steps anymore.  He denies any cough. He has not noticed any wheezing recently - has in the past. He did a lot of wheezing when he was in the hospital. He denies any SOB at rest, allergies, GERD, or CP.     CAT today 10     25: Since last visit he has been doing ok. He was using bevespi BID to anoro. He does not like the anoro because it causes a bitter taste in his mouth.  He uses his PRN albuterol HFA 6-7 x per day and albuterol nebulizers 4x a day. He denies any productive cough -- has a dry cough. He will at times notice wheezing. His wife states his SPARKS is worse - he states he agrees. He has SOB at times with tying his shoes. He denies any SOB at rest, CP, or allergies.   GERD occasionally depending on what he eats - varies.      23: He has been using his bevespi twice daily, albuterol nebulizer 4x a day, and albuterol HFA a few times a week. We were able to get him a new nebulizer machine last visit. He denies any cough. He has had some wheezing intermittently. He has SPARKS with going up stairs and walking on level ground after a few minutes. He has noticed some episodes of SOB at rest. He denies any CP or  allergies. He will notice some GERD symptoms - depending on what he eats.     10/2/23: Since last visit he underwent SBRT from 8/2023- 9/2023. He states overall he is ok. He states at times his breathing gets a little tired. He was able to get the PRN albuterol and has found it helpful. He has used it a couple times a day. He has wheezing at night and his wife will hear it during the day as well. He has SPARKS with walking on level ground after a few minutes and with going up stairs. He denies any cough, SOB at rest, CP, or GERD. He denies any nasal nasal congestion or runny nose. He does have post nasal drip/ throat clearing. GERD He has been told he snores -- no witnessed apneas.       6/15/23: Mr. Smith presented to the ED with hemoptysis and was found to have a RUL lung mass. He had bronchoscopy with biopsy that showed adenocarcinoma. LN 7 biopsied and negative for malignancy. He denies any SPARKS. He has PRN proair at home - he will use it if he is wheezing. He uses his rescue 4-5 x a week. He has been on the albuterol for the last 2 years.He notices intermittent wheezing. He denies any cough, SOB at rest, allergies, or GERD. HE had some CP from his bronchoscopy - still a little sore, but much improved from previous. He has been having LE swelling. He had COVID in 10/ 2021 - went to PCP at [facility]. He had infection on his foot - happened to find out he was positive from this visit. He states his swelling has been going on intermittently for a number of years.      Previous pulmonary history: He has no history of recurrent infections, or lung disease as a child. He had no previous lung hx, never on oxygen or inhaler therapy.      Inhalers/nebulized medications: PRN albuterol      Hospitalization History: He has not been hospitalized over the last year for breathing related problem.     Sleep history: Denies snoring, apnea, feeling tired during the day or taking naps during the day. He will doze a times.         ALLERGIES AND MEDICATIONS     ALLERGIES  No Known Allergies    MEDICATIONS  Current Outpatient Medications   Medication Sig Dispense Refill    albuterol (Ventolin HFA) 90 mcg/actuation inhaler Inhale 2 puffs every 4 hours if needed for wheezing or shortness of breath. 24 g 3    albuterol 2.5 mg /3 mL (0.083 %) nebulizer solution TAKE 3 ML (2.5 MG) BY NEBULIZATION 4 TIMES A DAY AS NEEDED FOR WHEEZING OR SHORTNESS OF BREATH. 75 mL 5    amLODIPine (Norvasc) 10 mg tablet Take 1 tablet (10 mg) by mouth once daily.      ammonium lactate (Lac-Hydrin) 12 % lotion Apply 1 Application topically 2 times a day.      atorvastatin (Lipitor) 10 mg tablet Take 1 tablet (10 mg) by mouth once daily.      Breztri Aerosphere 160-9-4.8 mcg/actuation HFA aerosol inhaler INHALE 2 PUFFS 2 TIMES A DAY. 10.7 g 4    budesonide (Pulmicort) 0.5 mg/2 mL nebulizer solution 1 VIAL INHALATION TWICE A DAY 90 DAYS      finasteride (Proscar) 5 mg tablet Take 1 tablet (5 mg) by mouth once daily.      hydrALAZINE (Apresoline) 50 mg tablet 1 TABLET WITH FOOD ORALLY EVERY 8 HOURS 90 DAYS      nystatin (Mycostatin) 100,000 unit/gram powder Apply 1 Application topically 2 times a day.      tamsulosin (Flomax) 0.4 mg 24 hr capsule Take 1 capsule (0.4 mg) by mouth 2 times a day. 30 minutes after the same meal each day.      umeclidinium-vilanteroL (Anoro Ellipta) 62.5-25 mcg/actuation blister with inhaler device Inhale 1 puff once daily.       No current facility-administered medications for this visit.         PAST HISTORY     PAST MEDICAL HISTORY  - HTN   - BPH   - lung cancer - adenocarcinoma - s/p SBRT 8/2023  -9/2023. RUL reoccurence 2/2025 - proton therapy   - GI bleed 4/2025     PAST SURGICAL HISTORY  Past Surgical History:   Procedure Laterality Date    BRONCHOSCOPY      Bronchoscopy with biopsy showed adenocarcinoma.    COLONOSCOPY      CT ANGIO NECK  05/18/2017    CT NECK ANGIO W AND WO [place] [facility] [place]    CT HEAD ANGIO  W AND WO IV CONTRAST  05/18/2017    CT HEAD ANGIO W AND WO IV CONTRAST 5/18/2017 [facility] [place] [facility]       IMMUNIZATION HISTORY  Immunization History   Administered Date(s) Administered    Flu vaccine, trivalent, preservative free, age 6 months and greater (Fluarix/Fluzone/Flulaval) 08/01/2015    Meningococcal ACWY-D (Menactra) 4-valent conjugate vaccine 08/01/2015    Moderna SARS-CoV-2 Vaccination 04/14/2021, 05/12/2021       SOCIAL HISTORY  smoking: [age] 5-10 cigarettes per day - quit last month ~50 pack years   drinking: none  illicit drug use: none       OCCUPATIONAL/ENVIRONMENTAL HISTORY  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     RESULTS/DATA     Pulmonary Function Test Results     PFTs:   - 7/13/23 - FEV1/FVC 0.57/ FEV1 1.06 (43%)/ FVC 1.84 (56%)/ DLCO 29%   - 2/10/25 - FEV1/FVC 0.63/ FEV1 1.27 (45% no BD resp)/ FVC 1.93 (51%)/ TLC 5.38 (76%) - no BD resp     6MWT: 2/10/25 - 224m () 94-91% on RA         Chest Radiograph     XR chest 2 view 06/09/2023  1. Redemonstration of 4 cm ovoid mass in the right upper lobe.  2. Minimal bibasilar atelectasis.      Chest CT Scan   CT chest:   - 5/16/23 - IMPRESSION: No evidence of pulmonary embolism. Right upper lobe pulmonary mass measuring 3.7 x 2.7 cm is highly suspicious for malignancy. Adjacent patchy airspace disease is demonstrated in the right upper lobe. Small nodular density is also identified in the left upper lobe measuring 6 mm. Small low-attenuation lesions are identified in the visualized left hepatic lobe. Findings may represent cysts; however, metastatic lesions are not entirely excluded.   - 6/10/23 - Stable appearance of heterogeneous mass in the anterior segment of the right upper lobe, consistent with known lung malignancy. There is adjacent irregular interlobular thickening extending to the subpleural and  sub-fissural planes, concerning for a component of lymphangitis. An additional stable punctate nodular opacity is indeterminate, however suspicious for a satellite nodule, in the setting of the additional changes as above. 2. Stable appearance of enlarged and prominent mediastinal lymph nodes, suspicious for glenis involvement. ABDOMEN-PELVIS: 1. No evidence of metastatic involvement in the abdomen and pelvis. 2. Cholelithiasis. 3. Punctate sclerotic focus in the body of L3, favored to represent a bony island, however attention on follow-up is recommended. 4. Additional findings as above   - 12/14/23- Interval decreased size of a right upper lobe pulmonary lesion with spiculated margins now measuring 2.6 x 1.5 cm. There are adjacent postradiation changes. 2. No evidence of new lesion throughout the bilateral lungs. 3. No significant mediastinal or hilar lymphadenopathy.4. Background of moderate emphysematous changes of the bilateral lungs. 5. Additional findings as above.    - 4/18/24 - Continued interval decrease in size of right upper lobe pulmonary  mass as described above.  2. Stable small pulmonary nodules as described above with no new  pulmonary nodules appreciated.  3. Moderate upper lung predominant emphysema.     10/24/24 - Interval increase in spiculated area of masslike consolidation in  the right upper lobe which now measures 3.1 x 1.5 cm as well as  additional increased areas of interlobular septal thickening and  linear parenchymal opacity. There is also interval appearance of a  1.1 cm nodule within the right upper lobe which may be associated  with the linear parenchymal opacities. While these findings may  represent evolving postradiation changes/fibrosis, local disease  recurrence is not excluded and continued close attention on follow-up  imaging is recommended.  2. Severe upper lung predominant emphysema.  3. Additional chronic and incidental findings as above.    2/18/25 - Melanoma restaging  exam compared to CT chest 01/23/2025.  1. Again seen couple of nodular consolidative opacity within the  right upper lobe that is unchanged in size compared to prior CT chest  01/23/2025 but increased in size compared to CT chest 10/24/2024.  There are persistent areas of interlobular septal thickening and  linear opacities. The anterior portions of the nodular consolidative  opacity were avid on recent PET-CT, concerning for recurrent disease  with new primary lung malignancy not excluded.  2. Subcentimeter right paratracheal lymph node that was mildly avid  on previous PET-CT. This finding is nonspecific with glenis metastatic  disease not excluded.  3. Moderate-to-severe upper lung predominant emphysema.  4. Additional findings as described above.    6/8/25   Similar spiculated nodules with surrounding ground-glass opacities  throughout the anterior segments of the right upper lobe over a  background of postradiation changes correlating with patient's known  malignancy. Remaining pulmonary nodules and prominent mediastinal  nodes are unchanged when compared to prior exam.  2. New trace right pleural effusion.  3. Stable moderate severe centrilobular and paraseptal emphysema with  findings of chronic bronchitis. 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  4. Stable fat attenuating lipoma within the right deltoid muscle  measuring 3.7 x 2.3 cm.  5. Additional incidental non-acute findings as detailed above.    PET: 7/12/23: Hypermetabolic anterior right upper lobe mass consistent with  biopsy-proven malignancy.  2. Nonspecific mildly hypermetabolic mediastinal and bilateral hilar  lymph nodes which may be reactive in nature, however metastatic  disease can not be definitely excluded.  3. Hypermetabolic focus in the region of the right parotid gland  which is favored to represent a benign process such as a  Warthin's  tumor with a metastatic lymph node felt to be less likely.     Echocardiogram     Echo: 7/13/23: EF 60-65%, RA normal size, RV normal size and function.    11/15/24 -   Left ventricular ejection fraction is normal, by visual estimate at 65-70%.   2. There is normal right ventricular global systolic function.   3. No significant valvular heart disease.   4. No pericardial effusion.   RA normal size, RV normal size and function         Labs/ Other testing      Lung biopsy - 6/7/23 - LUNG, RIGHT UPPER LOBE NODULE, BIOPSY: --INVASIVE POORLY DIFFERENTIATED ADENOCARCINOMA ADENOCARCINOMA. SEE NOTE. --SEE CONCURRENT CYTOLOGY SPECIMEN [id number].   B. FINE NEEDLE ASPIRATION 7 LYMPH NODE, CYTOLOGY AND CELL BLOCK: NO MALIGNANT CELLS IDENTIFIED. LYMPHOID SAMPLE.         FINAL DIAGNOSIS 2/18/2025   A. LUNG, RIGHT UPPER LOBE NODULE#1; BIOPSY:   -- Rare highly atypical glands, consistent with adenocarcinoma, acinar pattern.   PDL1 Tumor Proportion Score (TPS): <1%      B. LUNG, RIGHT UPPER LOBE NODULE#2; BIOPSY:   -- Non-diagnostic fragments of lung parenchyma.      Final Cytological Interpretation 2/18/2025   A. LUNG FINE NEEDLE ASPIRATION RIGHT UPPER LOBE- RUL NODULE #1 : PET POSITIVE, CYTOLOGY AND CELL BLOCK:   -- Non diagnostic specimen due to insufficient cellularity.  -- Please refer to concurrent surgical specimen, [id number].                            B. BRONCHIAL BRUSH RIGHT UPPER LOBE - RUL NODULE #1 : PET POSITIVE , CYTOLOGY AND CELL BLOCK:   -- Non diagnostic specimen due to insufficient cellularity.  -- Please refer to concurrent surgical specimen, [id number].              C. LUNG FINE NEEDLE ASPIRATION RIGHT UPPER LOBE - RUL NODULE #2 , CYTOLOGY AND CELL BLOCK:   -- Non diagnostic specimen due to insufficient cellularity.  -- Please refer to concurrent surgical specimen, [id number].                            D. LYMPH NODE 4 L PULMONARY FINE NEEDLE ASPIRATION, CYTOLOGY AND CELL BLOCK:    -- No malignant  cells identified.  -- Lymphoid sample.            E. LYMPH NODE 7 PULMONARY FINE NEEDLE ASPIRATION, CYTOLOGY AND CELL BLOCK:    -- No malignant cells identified.  -- Lymphoid sample.     F. LYMPH NODE 4 R PULMONARY FINE NEEDLE ASPIRATION, CYTOLOGY AND CELL BLOCK:   -- No malignant cells identified.  -- Lymphoid sample.     G. LYMPH NODE 11 Rs PULMONARY FINE NEEDLE ASPIRATION, CYTOLOGY AND CELL BLOCK:   -- Non diagnostic specimen due to insufficient cellularity.     H. LYMPH NODE 11 Ri PULMONARY FINE NEEDLE ASPIRATION, CYTOLOGY AND CELL BLOCK:   -- No malignant cells identified.  -- Lymphoid sample       REVIEW OF SYSTEMS     REVIEW OF SYSTEMS  Review of Systems   Constitutional:  Negative for fatigue and fever.   HENT:  Negative for congestion, postnasal drip, rhinorrhea, sinus pressure and voice change.    Eyes:  Negative for pain and visual disturbance.   Cardiovascular:  Positive for leg swelling. Negative for chest pain and palpitations.   Gastrointestinal:  Negative for abdominal pain, diarrhea, nausea and vomiting.   Endocrine: Negative for cold intolerance and heat intolerance.   Musculoskeletal:  Negative for arthralgias, back pain, joint swelling and myalgias.   Skin:  Negative for rash.   Neurological:  Negative for dizziness, weakness, numbness and headaches.   Psychiatric/Behavioral:  Positive for sleep disturbance. Negative for agitation. The patient is not nervous/anxious.          PHYSICAL EXAM     VITAL SIGNS: /69   Pulse 65   Temp 36.6 °C (97.8 °F)   Wt 81.6 kg (180 lb)   SpO2 95% Comment: RA  BMI 25.83 kg/m²      CURRENT WEIGHT: [unfilled]  BMI: [unfilled]  PREVIOUS WEIGHTS:  Wt Readings from Last 3 Encounters:   06/23/25 81.6 kg (180 lb)   06/02/25 83.3 kg (183 lb 9.6 oz)   04/28/25 83.7 kg (184 lb 9.6 oz)       Physical Exam  Vitals reviewed.   Constitutional:       General: She is not in acute distress.     Appearance: Normal appearance. She is not ill-appearing or toxic-appearing.   HENT:       Head: Normocephalic.      Nose: No rhinorrhea.   Cardiovascular:      Rate and Rhythm: Normal rate and regular rhythm.      Heart sounds: Normal heart sounds.   Pulmonary:      Effort: Pulmonary effort is normal. No respiratory distress.      Breath sounds: Normal breath sounds. No stridor. No wheezing, rhonchi or rales.   Abdominal:      General: Abdomen is flat.   Musculoskeletal:         General: Normal range of motion.      Right lower leg: No edema.      Left lower leg: No edema.   Skin:     General: Skin is warm and dry.      Nails: There is no clubbing.   Neurological:      General: No focal deficit present.      Mental Status: She is alert and oriented to person, place, and time.   Psychiatric:         Mood and Affect: Mood normal.         Behavior: Behavior normal.         Judgment: Judgment normal.         ASSESSMENT/PLAN       1. Lung cancer: adenocarcinoma - 7LN biopsy negative for malignancy- s/p SBRT 2023. Reoccurrence 2/2025 - s/p radiation/ proton therapy   - continue to follow with  Rad/ Onc   - repeat imaging per Rad/Onc      2. COPD: PFTs with severe obstruction. Echo with normal EF.   - continue breztri 2 puffs twice daily - rinse mouth out afterwards   - continue albuterol HFA 2 puffs or albuterol nebulizers every 4-6 hours as needed   - check pulse ox - if dropping <88% with walking   - referral to pulmonary rehab   - given  COPD book in clinic today      Thank you for visiting the Pulmonary clinic today!   Return to clinic  3- 6 months   or sooner if needed   Gabbie Sutton CNP  My office -  [phone]- Jessica is my . Radha is my nurse [phone].   Radiology scheduling [phone]   Appointment scheduling [phone]   Pulmonary function testing - [phone]

## 2025-06-23 NOTE — PATIENT INSTRUCTIONS
1. Lung cancer: adenocarcinoma - 7LN biopsy negative for malignancy- s/p SBRT 2023. Reoccurrence 2/2025 - s/p radiation/ proton therapy   - continue to follow with  Rad/ Onc   - repeat imaging per Rad/Onc      2. COPD: PFTs with severe obstruction. Echo with normal EF.   - continue breztri 2 puffs twice daily - rinse mouth out afterwards   - continue albuterol HFA 2 puffs or albuterol nebulizers every 4-6 hours as needed   - check pulse ox - if dropping <88% with walking   - referral to pulmonary rehab     Thank you for visiting the Pulmonary clinic today!   Return to clinic  3- 6 months   or sooner if needed   Gabbie Sutton CNP  My office -  [phone]- Jessica is my . Radha is my nurse [phone].   Radiology scheduling [phone]   Appointment scheduling [phone]   Pulmonary function testing - [phone]

## 2025-07-23 ENCOUNTER — HOSPITAL ENCOUNTER (OUTPATIENT)
Dept: RADIOLOGY | Facility: HOSPITAL | Age: 80
Discharge: HOME | End: 2025-07-23
Payer: MEDICARE

## 2025-07-23 ENCOUNTER — LAB (OUTPATIENT)
Dept: LAB | Facility: HOSPITAL | Age: 80
End: 2025-07-23
Payer: MEDICARE

## 2025-07-23 DIAGNOSIS — K62.2 ANAL PROLAPSE: Primary | ICD-10-CM

## 2025-07-23 DIAGNOSIS — K92.2 ACUTE LOWER GI BLEEDING: ICD-10-CM

## 2025-07-23 DIAGNOSIS — R07.82 INTERCOSTAL PAIN: ICD-10-CM

## 2025-07-23 DIAGNOSIS — C34.11 MALIGNANT NEOPLASM OF UPPER LOBE, RIGHT BRONCHUS OR LUNG: ICD-10-CM

## 2025-07-23 LAB
ACANTHOCYTES BLD QL SMEAR: NORMAL
ALBUMIN SERPL BCP-MCNC: 4 G/DL (ref 3.4–5)
ALP SERPL-CCNC: 60 U/L (ref 33–136)
ALT SERPL W P-5'-P-CCNC: 7 U/L (ref 7–52)
ANION GAP SERPL CALC-SCNC: 11 MMOL/L (ref 10–20)
AST SERPL W P-5'-P-CCNC: 15 U/L (ref 9–39)
BASOPHILS # BLD AUTO: 0.04 X10*3/UL (ref 0–0.1)
BASOPHILS NFR BLD AUTO: 1.3 %
BILIRUB SERPL-MCNC: 0.7 MG/DL (ref 0–1.2)
BUN SERPL-MCNC: 15 MG/DL (ref 6–23)
BURR CELLS BLD QL SMEAR: NORMAL
CALCIUM SERPL-MCNC: 8.7 MG/DL (ref 8.6–10.6)
CHLORIDE SERPL-SCNC: 107 MMOL/L (ref 98–107)
CO2 SERPL-SCNC: 28 MMOL/L (ref 21–32)
CREAT SERPL-MCNC: 0.81 MG/DL (ref 0.5–1.3)
EGFRCR SERPLBLD CKD-EPI 2021: 73 ML/MIN/1.73M*2
EOSINOPHIL # BLD AUTO: 0.07 X10*3/UL (ref 0–0.4)
EOSINOPHIL NFR BLD AUTO: 2.3 %
ERYTHROCYTE [DISTWIDTH] IN BLOOD BY AUTOMATED COUNT: 23 % (ref 11.5–14.5)
GLUCOSE SERPL-MCNC: 80 MG/DL (ref 74–99)
HCT VFR BLD AUTO: 30.1 % (ref 36–52)
HGB BLD-MCNC: 8.6 G/DL (ref 12–17.5)
HYPOCHROMIA BLD QL SMEAR: NORMAL
IMM GRANULOCYTES # BLD AUTO: 0.01 X10*3/UL (ref 0–0.5)
IMM GRANULOCYTES NFR BLD AUTO: 0.3 % (ref 0–0.9)
LYMPHOCYTES # BLD AUTO: 0.75 X10*3/UL (ref 0.8–3)
LYMPHOCYTES NFR BLD AUTO: 24.8 %
MCH RBC QN AUTO: 19.5 PG (ref 26–34)
MCHC RBC AUTO-ENTMCNC: 28.6 G/DL (ref 32–36)
MCV RBC AUTO: 68 FL (ref 80–100)
MONOCYTES # BLD AUTO: 0.4 X10*3/UL (ref 0.05–0.8)
MONOCYTES NFR BLD AUTO: 13.2 %
NEUTROPHILS # BLD AUTO: 1.76 X10*3/UL (ref 1.6–5.5)
NEUTROPHILS NFR BLD AUTO: 58.1 %
NRBC BLD-RTO: 0 /100 WBCS (ref 0–0)
PLATELET # BLD AUTO: 282 X10*3/UL (ref 150–450)
POTASSIUM SERPL-SCNC: 4 MMOL/L (ref 3.5–5.3)
PROT SERPL-MCNC: 8 G/DL (ref 6.4–8.2)
RBC # BLD AUTO: 4.42 X10*6/UL (ref 4–5.9)
RBC MORPH BLD: NORMAL
SCHISTOCYTES BLD QL SMEAR: NORMAL
SODIUM SERPL-SCNC: 142 MMOL/L (ref 136–145)
TARGETS BLD QL SMEAR: NORMAL
WBC # BLD AUTO: 3 X10*3/UL (ref 4.4–11.3)

## 2025-07-23 PROCEDURE — 71046 X-RAY EXAM CHEST 2 VIEWS: CPT | Mod: RIGHT SIDE | Performed by: RADIOLOGY

## 2025-07-23 PROCEDURE — 80053 COMPREHEN METABOLIC PANEL: CPT

## 2025-07-23 PROCEDURE — 71100 X-RAY EXAM RIBS UNI 2 VIEWS: CPT | Mod: RT

## 2025-07-23 PROCEDURE — 71046 X-RAY EXAM CHEST 2 VIEWS: CPT

## 2025-07-23 PROCEDURE — 85025 COMPLETE CBC W/AUTO DIFF WBC: CPT

## 2025-07-23 PROCEDURE — 36415 COLL VENOUS BLD VENIPUNCTURE: CPT

## 2025-07-23 PROCEDURE — 71100 X-RAY EXAM RIBS UNI 2 VIEWS: CPT | Mod: RIGHT SIDE | Performed by: RADIOLOGY

## 2025-09-04 ENCOUNTER — TELEPHONE (OUTPATIENT)
Dept: RADIATION ONCOLOGY | Facility: HOSPITAL | Age: 80
End: 2025-09-04

## 2025-09-04 ENCOUNTER — APPOINTMENT (OUTPATIENT)
Dept: OPHTHALMOLOGY | Facility: CLINIC | Age: 80
End: 2025-09-04
Payer: MEDICARE

## 2025-09-04 ASSESSMENT — REFRACTION_MANIFEST
METHOD_AUTOREFRACTION: 1
OS_SPHERE: +1.50
OS_SPHERE: +2.00
OS_CYLINDER: -0.75
OD_CYLINDER: -0.50
OD_CYLINDER: -1.25
OS_AXIS: 102
OS_ADD: +3.00
OD_AXIS: 086
OD_ADD: +3.00
OS_CYLINDER: -1.25
OD_SPHERE: +1.25
OS_AXIS: 102
OD_AXIS: 086
OD_SPHERE: +1.25

## 2025-09-04 ASSESSMENT — VISUAL ACUITY
CORRECTION_TYPE: GLASSES
METHOD: SNELLEN - LINEAR
OS_CC+: -1
OS_CC: 20/20
OD_CC+: +2
OD_CC: 20/30

## 2025-09-04 ASSESSMENT — REFRACTION_WEARINGRX
OD_AXIS: 100
OS_SPHERE: +1.75
OS_ADD: +2.50
OD_SPHERE: +1.50
OD_CYLINDER: -0.75
OD_ADD: +2.50
OS_CYLINDER: -1.25
OS_AXIS: 105

## 2025-09-04 ASSESSMENT — ENCOUNTER SYMPTOMS
HEMATOLOGIC/LYMPHATIC NEGATIVE: 0
CARDIOVASCULAR NEGATIVE: 0
MUSCULOSKELETAL NEGATIVE: 0
ALLERGIC/IMMUNOLOGIC NEGATIVE: 0
EYES NEGATIVE: 1
PSYCHIATRIC NEGATIVE: 0
CONSTITUTIONAL NEGATIVE: 0
RESPIRATORY NEGATIVE: 0
GASTROINTESTINAL NEGATIVE: 0
NEUROLOGICAL NEGATIVE: 0
ENDOCRINE NEGATIVE: 0

## 2025-09-04 ASSESSMENT — EXTERNAL EXAM - LEFT EYE: OS_EXAM: NORMAL

## 2025-09-04 ASSESSMENT — EXTERNAL EXAM - RIGHT EYE: OD_EXAM: NORMAL

## 2025-09-04 ASSESSMENT — TONOMETRY
OD_IOP_MMHG: 8
IOP_METHOD: TONOPEN
OS_IOP_MMHG: 10

## 2025-09-04 ASSESSMENT — CUP TO DISC RATIO
OD_RATIO: .3
OS_RATIO: .3

## 2025-09-04 ASSESSMENT — SLIT LAMP EXAM - LIDS
COMMENTS: GOOD POSITION, MGD
COMMENTS: GOOD POSITION, MGD

## 2025-09-05 DIAGNOSIS — C34.91 MALIGNANT NEOPLASM OF UNSPECIFIED PART OF RIGHT BRONCHUS OR LUNG (MULTI): Primary | ICD-10-CM

## 2026-09-10 ENCOUNTER — APPOINTMENT (OUTPATIENT)
Dept: OPHTHALMOLOGY | Facility: CLINIC | Age: 81
End: 2026-09-10
Payer: MEDICARE

## (undated) DEVICE — GOWN, ASTOUND, XXL

## (undated) DEVICE — TUBING, SUCTION, CONNECTING, STERILE 0.25 X 120 IN., LF

## (undated) DEVICE — SUTURE, VICRYL, 2-0, 18 IN, UNDYED

## (undated) DEVICE — SUTURE, VICRYL, 4-0, 18 IN, UNDYED BR PS-2

## (undated) DEVICE — ADHESIVE, SKIN, LIQUIBAND EXCEED

## (undated) DEVICE — TOWEL, SURGICAL, NEURO, O/R, 16 X 26, BLUE, STERILE

## (undated) DEVICE — SUTURE, VICRYL, 3-0, 27 IN, SH, VIOLET

## (undated) DEVICE — Device

## (undated) DEVICE — SUTURE, VICRYL, 3-0, 18 IN, UNDYED

## (undated) DEVICE — DRAPE, SHEET, ENDOSCOPY, GENERAL, FENESTRATED, ARMBOARD COVER, 98 X 123.5 IN, DISPOSABLE, LF, STERILE

## (undated) DEVICE — COVER, CART, 45 X 27 X 48 IN, CLEAR

## (undated) DEVICE — SUTURE, PROLENE, 1, 30 IN, CT-1, BLUE

## (undated) DEVICE — MANIFOLD, 4 PORT NEPTUNE STANDARD

## (undated) DEVICE — SUTURE, VICRYL PLUS, 0, 1X27IN, CT-1, VIOLET

## (undated) DEVICE — APPLICATOR, CHLORAPREP, W/ORANGE TINT, 26ML